# Patient Record
Sex: FEMALE | Race: WHITE | NOT HISPANIC OR LATINO | Employment: FULL TIME | URBAN - METROPOLITAN AREA
[De-identification: names, ages, dates, MRNs, and addresses within clinical notes are randomized per-mention and may not be internally consistent; named-entity substitution may affect disease eponyms.]

---

## 2017-05-12 DIAGNOSIS — N83.201 RIGHT OVARIAN CYST: Primary | ICD-10-CM

## 2017-05-12 DIAGNOSIS — R97.0 ELEVATED CEA: ICD-10-CM

## 2017-05-29 ENCOUNTER — HOSPITAL ENCOUNTER (OUTPATIENT)
Dept: RADIOLOGY | Facility: HOSPITAL | Age: 37
Discharge: HOME | End: 2017-05-29
Attending: OBSTETRICS & GYNECOLOGY
Payer: MEDICAID

## 2017-05-29 DIAGNOSIS — N83.201 RIGHT OVARIAN CYST: ICD-10-CM

## 2017-05-29 PROCEDURE — 76856 US EXAM PELVIC COMPLETE: CPT | Mod: TC

## 2017-05-29 PROCEDURE — 76830 TRANSVAGINAL US NON-OB: CPT | Mod: 26,,, | Performed by: RADIOLOGY

## 2017-05-29 PROCEDURE — 76856 US EXAM PELVIC COMPLETE: CPT | Mod: 26,,, | Performed by: RADIOLOGY

## 2017-05-30 ENCOUNTER — LAB VISIT (OUTPATIENT)
Dept: LAB | Facility: HOSPITAL | Age: 37
End: 2017-05-30
Attending: OBSTETRICS & GYNECOLOGY
Payer: MEDICAID

## 2017-05-30 ENCOUNTER — INITIAL CONSULT (OUTPATIENT)
Dept: GYNECOLOGIC ONCOLOGY | Facility: CLINIC | Age: 37
End: 2017-05-30
Payer: MEDICAID

## 2017-05-30 VITALS
SYSTOLIC BLOOD PRESSURE: 127 MMHG | HEIGHT: 66 IN | HEART RATE: 99 BPM | BODY MASS INDEX: 28.34 KG/M2 | WEIGHT: 176.38 LBS | DIASTOLIC BLOOD PRESSURE: 81 MMHG

## 2017-05-30 DIAGNOSIS — N83.201 BILATERAL OVARIAN CYSTS: ICD-10-CM

## 2017-05-30 DIAGNOSIS — N83.202 BILATERAL OVARIAN CYSTS: ICD-10-CM

## 2017-05-30 DIAGNOSIS — R97.0 ELEVATED CEA: Primary | ICD-10-CM

## 2017-05-30 DIAGNOSIS — R10.2 FEMALE PELVIC PAIN: ICD-10-CM

## 2017-05-30 DIAGNOSIS — R97.0 ELEVATED CEA: ICD-10-CM

## 2017-05-30 LAB
ANION GAP SERPL CALC-SCNC: 13 MMOL/L
BUN SERPL-MCNC: 10 MG/DL
CALCIUM SERPL-MCNC: 9.9 MG/DL
CHLORIDE SERPL-SCNC: 103 MMOL/L
CO2 SERPL-SCNC: 20 MMOL/L
CREAT SERPL-MCNC: 0.8 MG/DL
EST. GFR  (AFRICAN AMERICAN): >60 ML/MIN/1.73 M^2
EST. GFR  (NON AFRICAN AMERICAN): >60 ML/MIN/1.73 M^2
GLUCOSE SERPL-MCNC: 196 MG/DL
POTASSIUM SERPL-SCNC: 4.1 MMOL/L
SODIUM SERPL-SCNC: 136 MMOL/L

## 2017-05-30 PROCEDURE — 99204 OFFICE O/P NEW MOD 45 MIN: CPT | Mod: S$PBB,,, | Performed by: OBSTETRICS & GYNECOLOGY

## 2017-05-30 PROCEDURE — 36415 COLL VENOUS BLD VENIPUNCTURE: CPT

## 2017-05-30 PROCEDURE — 80048 BASIC METABOLIC PNL TOTAL CA: CPT

## 2017-05-30 PROCEDURE — 99999 PR PBB SHADOW E&M-EST. PATIENT-LVL V: CPT | Mod: PBBFAC,,, | Performed by: OBSTETRICS & GYNECOLOGY

## 2017-05-30 RX ORDER — MEDROXYPROGESTERONE ACETATE 150 MG/ML
INJECTION, SUSPENSION INTRAMUSCULAR
Refills: 2 | COMMUNITY
Start: 2017-03-15 | End: 2017-05-30

## 2017-05-30 RX ORDER — SERTRALINE HYDROCHLORIDE 100 MG/1
TABLET, FILM COATED ORAL
Refills: 6 | COMMUNITY
Start: 2017-05-08 | End: 2020-02-03

## 2017-05-30 RX ORDER — ONDANSETRON 4 MG/1
8 TABLET, ORALLY DISINTEGRATING ORAL
COMMUNITY
End: 2020-09-02 | Stop reason: SDUPTHER

## 2017-05-30 RX ORDER — CIPROFLOXACIN 500 MG/1
500 TABLET ORAL 2 TIMES DAILY
Refills: 0 | COMMUNITY
Start: 2017-04-27 | End: 2017-05-30

## 2017-05-30 RX ORDER — METFORMIN HYDROCHLORIDE 1000 MG/1
1000 TABLET ORAL 2 TIMES DAILY
COMMUNITY
End: 2017-05-30 | Stop reason: SDUPTHER

## 2017-05-30 RX ORDER — ACETAMINOPHEN AND CODEINE PHOSPHATE 120; 12 MG/5ML; MG/5ML
1 SOLUTION ORAL DAILY
Qty: 30 TABLET | Refills: 11 | Status: SHIPPED | OUTPATIENT
Start: 2017-05-30 | End: 2020-02-03

## 2017-05-30 RX ORDER — NITROFURANTOIN 25; 75 MG/1; MG/1
CAPSULE ORAL
Refills: 0 | COMMUNITY
Start: 2017-03-14 | End: 2017-05-30

## 2017-05-30 RX ORDER — FENOFIBRATE 134 MG/1
134 CAPSULE ORAL DAILY
COMMUNITY
End: 2017-05-30 | Stop reason: SDUPTHER

## 2017-05-30 NOTE — LETTER
May 30, 2017      Ha Neumann MD  [address]  [place] [address]           [facility]  [address]  [place] [address]  Phone: [phone]          Patient: Codi Wilkerson   MR Number: [id number]   YOB: 1980   Date of Visit: 5/30/2017       Dear Dr. Ha Neumann:    Thank you for referring Codi Wilkerson to me for evaluation. Attached you will find relevant portions of my assessment and plan of care.    If you have questions, please do not hesitate to call me. I look forward to following Codi Wilkerson along with you.    Sincerely,    Adi Reyes MD    Enclosure  CC:  No Recipients    If you would like to receive this communication electronically, please contact [email or web address] or [phone] to request more information on [facility] Link access.    For providers and/or their staff who would like to refer a patient to [facility], please contact us through our one-stop-shop provider referral line, [facility], at [phone].    If you feel you have received this communication in error or would no longer like to receive these types of communications, please e-mail [email or web address]

## 2017-05-30 NOTE — PROGRESS NOTES
Subjective:       Patient ID: Codi Wilkerson is a 36 y.o. female.    Chief Complaint: Rt Ovarian Cyst (Consult )    HPI   Patient comes in as a referral from Dr. Ha Neumann for elevated CEA.   She saw him for chronic pelvic pain and possible need for surgery. She has a history of ovarian cysts that have been symptomatic off and on. She was seeing Dr. Mert Mancera but had to change because he did not take her insurance.   Pelvic US by Dr. Neumann showed right ovary that was normal in measurements. Left ovary measured 4 x 3 x 3.7 cm. No abnormalities were noted.   CT scan of the abdomen and pelvis was done which showed a 2.5 cm complex appearing cyst like structure. Left ovary with multiple smaller cyst./ poorly distinguished.   He order tumor markers.   CEA was 6.3 (<=5.5 smoker).    was 7, LD was 1785(normal) and AFP was 1.7(normal).     Patient has been referred for further management.     Had CEA drawn here prior to visit and it is elevated at 7.7.     Pelvic US shows:   Right ovary measures 2.8 x 2.5 x 2.0 cm. There is a 2.0 2.1 x 2.0 cm right ovarian cyst with minimal peripheral vascularity consistent with corpus luteum cyst.  Within the right ovary there is normal arterial and venous flow with resistive index 0.62.    Left ovary measures 4.5 x 3.3 x 3.6 cm. There is a 2.5 x 2.8 x 3.0 cm left ovarian cyst with peripheral vascularity, consistent with corpus luteum cyst. Within the left ovary there is normal arterial and venous flow with resistive index 0.86.    There is no evidence of free fluid within the pelvis.    Consistent with bilateral corpus luteal cysts.       Chronic pelvic pain. Has been going on for years.   No inciting factors.     Saw Dr. Mert Mancera in the past and was given depo provera.   This seemed to help for a while.   S/P supracervical LH with ovarian conservation by Dr. Mancera.         Colonoscopy: 2 weeks ago had a sigmoidoscopy. Done for chronic diarrhea. Dr. Jerry at  "[facility].       Review of Systems   Constitutional: Negative for chills, fatigue and fever.   Respiratory: Negative for cough, shortness of breath and wheezing.    Cardiovascular: Negative for chest pain, palpitations and leg swelling.   Gastrointestinal: Positive for diarrhea. Negative for abdominal pain, constipation, nausea and vomiting.   Genitourinary: Positive for pelvic pain. Negative for difficulty urinating, dysuria, frequency, genital sores, hematuria, urgency, vaginal bleeding, vaginal discharge and vaginal pain.   Neurological: Negative for weakness.   Hematological: Negative for adenopathy. Does not bruise/bleed easily.   Psychiatric/Behavioral: The patient is not nervous/anxious.        Objective:     /81   Pulse 99   Ht 5' 6" (1.676 m)   Wt 80 kg (176 lb 5.9 oz)   LMP 02/12/2009   BMI 28.47 kg/m²     Physical Exam   Constitutional: She is oriented to person, place, and time. She appears well-developed and well-nourished.   HENT:   Head: Normocephalic and atraumatic.   Eyes: No scleral icterus.   Neck: Neck supple. No tracheal deviation present. No thyroid mass and no thyromegaly present.   Cardiovascular: Normal rate and regular rhythm.    Pulmonary/Chest: Effort normal and breath sounds normal. She has no wheezes.   Abdominal: Soft. She exhibits no distension and no mass. There is no hepatosplenomegaly. There is no tenderness. There is no rebound and no guarding.   Genitourinary:   Genitourinary Comments: Bimanual exam:  Vulva: no lesions. Normal appearance  Urethra: Normal size and location. No lesions  Bladder: No masses or tenderness.  Vagina: normal mucosa. No lesion  Cervix: present  Uterus: absent.  Adnexa: no masses.  Rectovaginal: No posterior cul de sac thickening or nodularity.  Rectal: no masses. Nontender. Normal tone.      Musculoskeletal: She exhibits no edema or tenderness.   Lymphadenopathy:     She has no cervical adenopathy.     She has no axillary adenopathy.        Right: " No inguinal and no supraclavicular adenopathy present.        Left: No inguinal and no supraclavicular adenopathy present.   Neurological: She is alert and oriented to person, place, and time.   Skin: Skin is warm and dry.   Psychiatric: She has a normal mood and affect. Her behavior is normal. Judgment and thought content normal.       Assessment:       1. Elevated CEA    2. Female pelvic pain    3. Bilateral ovarian cysts        Plan:   Elevated CEA  I explained tp patient and  that I do not think her ovaries are the cause for her elevated CEA.   I explained that she needs GI evaluation  Will also ask Med Onc to see her to exclude other causes of elevated CEA.   Will get CT scan of CAP    -     Basic metabolic panel; Future; Expected date: 05/30/2017  -     CT Abdomen Pelvis With Contrast; Future; Expected date: 05/30/2017  -     CT Chest With Contrast; Future; Expected date: 05/30/2017  -     Ambulatory consult to Hematology / Oncology  -     Ambulatory consult to Gastroenterology    Female pelvic pain    Bilateral ovarian cysts  I recommended suppression of ovarian function. I will start her on progestin only OCP given she is a smoker.     -     norethindrone (MICRONOR) 0.35 mg tablet; Take 1 tablet (0.35 mg total) by mouth once daily.  Dispense: 30 tablet; Refill: 11

## 2017-05-31 ENCOUNTER — TELEPHONE (OUTPATIENT)
Dept: GYNECOLOGIC ONCOLOGY | Facility: CLINIC | Age: 37
End: 2017-05-31

## 2017-05-31 ENCOUNTER — HOSPITAL ENCOUNTER (OUTPATIENT)
Dept: RADIOLOGY | Facility: HOSPITAL | Age: 37
Discharge: HOME | End: 2017-05-31
Attending: OBSTETRICS & GYNECOLOGY
Payer: MEDICAID

## 2017-05-31 ENCOUNTER — INITIAL CONSULT (OUTPATIENT)
Dept: HEMATOLOGY/ONCOLOGY | Facility: CLINIC | Age: 37
End: 2017-05-31
Payer: MEDICAID

## 2017-05-31 VITALS
BODY MASS INDEX: 28.27 KG/M2 | WEIGHT: 175.94 LBS | HEIGHT: 66 IN | TEMPERATURE: 98 F | SYSTOLIC BLOOD PRESSURE: 142 MMHG | DIASTOLIC BLOOD PRESSURE: 92 MMHG | HEART RATE: 101 BPM

## 2017-05-31 DIAGNOSIS — R97.0 ELEVATED CEA: Primary | ICD-10-CM

## 2017-05-31 DIAGNOSIS — R97.0 ELEVATED CEA: ICD-10-CM

## 2017-05-31 PROCEDURE — 74177 CT ABD & PELVIS W/CONTRAST: CPT | Mod: 26,,, | Performed by: RADIOLOGY

## 2017-05-31 PROCEDURE — 99999 PR PBB SHADOW E&M-EST. PATIENT-LVL III: CPT | Mod: PBBFAC,,, | Performed by: INTERNAL MEDICINE

## 2017-05-31 PROCEDURE — 71260 CT THORAX DX C+: CPT | Mod: 26,,, | Performed by: RADIOLOGY

## 2017-05-31 PROCEDURE — 99205 OFFICE O/P NEW HI 60 MIN: CPT | Mod: S$PBB,,, | Performed by: INTERNAL MEDICINE

## 2017-05-31 PROCEDURE — 99213 OFFICE O/P EST LOW 20 MIN: CPT | Mod: PBBFAC,25 | Performed by: INTERNAL MEDICINE

## 2017-05-31 RX ADMIN — IOHEXOL 75 ML: 350 INJECTION, SOLUTION INTRAVENOUS at 09:05

## 2017-05-31 RX ADMIN — IOHEXOL 30 ML: 300 INJECTION, SOLUTION INTRAVENOUS at 09:05

## 2017-05-31 NOTE — PROGRESS NOTES
Subjective:       Patient ID: Codi Wilkerson is a 36 y.o. female.    Chief Complaint: No chief complaint on file.    HPI  Ms. Wilkerson is referred for further evaluation of an elevated level of CEA.  She is referred by Dr. Reyes who brittany the CEA during workup for an ovarian cyst.  Pelvis ultrasound 5/29/17 showed normal sized ovaries with cysts bilaterally.  The uterus is surgically absent.  There is no evidence of free fluid within the pelvis.  CT scan of the C/A/P today showed an approximately 3 mm pleural-based nodule too small to characterize in the superior segment of the right lower lobe.  There is mild diffuse hepatic steatosis.  No focal enhancing hepatic masses or biliary ductal dilatation.  There is borderline hepatomegaly and the spleen size is at the upper limits of normal.  Small amount of retained stool in the right colon.  The visualized small bowel is unremarkable.    She has not had a colonoscopy, though she does have a variety of bowel complaints.  No weight loss and eating normally with good function.    PMH  Ovarian cysts  Depression  Diabetes on metformin  Reflux disease   Hyperlipidemia  Migraine    PSH  C-sectionx2  Ectopic  Hysterectomy    SOC  Smoke: PPD plus for 7 years  EtOH: Social  Pharmacy tech    FAM  Mother with HTN  Father with history of brain tumor  Brother A&W    Review of Systems   Constitutional: Negative for appetite change, fever and unexpected weight change.   HENT: Negative for mouth sores and sinus pressure.    Respiratory: Positive for shortness of breath. Negative for cough and choking.    Cardiovascular: Negative for chest pain and leg swelling.   Gastrointestinal: Positive for abdominal pain, constipation, diarrhea and nausea. Negative for anal bleeding and blood in stool.   Genitourinary: Negative for dysuria and frequency.   Musculoskeletal: Positive for back pain. Negative for arthralgias, gait problem and neck pain.   Skin: Negative for rash.   Neurological: Positive  for headaches. Negative for tremors, weakness and numbness.   Hematological: Negative for adenopathy.   Psychiatric/Behavioral: Positive for dysphoric mood. Negative for confusion and sleep disturbance.       Objective:      Physical Exam   Constitutional: She is oriented to person, place, and time. She appears well-developed and well-nourished. No distress.   HENT:   Head: Normocephalic and atraumatic.   Mouth/Throat: No oropharyngeal exudate.   Eyes: Conjunctivae are normal. Pupils are equal, round, and reactive to light.   Neck: Neck supple.   Cardiovascular: Normal rate and regular rhythm.    Pulmonary/Chest: Effort normal and breath sounds normal. She has no rales.   Abdominal: Soft. Bowel sounds are normal. She exhibits no mass. There is no tenderness.   Musculoskeletal: Normal range of motion. She exhibits no edema.   Lymphadenopathy:     She has no cervical adenopathy.   Neurological: She is alert and oriented to person, place, and time.   Skin: Skin is warm and dry. No rash noted.   Psychiatric: She has a normal mood and affect. Thought content normal.       Assessment:       1. Elevated CEA        Plan:       Ms. Wilkerson was found to have a mild elevation of CEA during a workup for an unrelated issue.  Mild elevations of CEA are not uncommon in smokers and this is the likely explanation in her case.  Her CT scan today fails to show worrisome lesions.  However, she does seem to have early MERRILL which might also contribute to the elevated CEA.      I discussed smoking cessation with her in detail today and she will try to convince her  join her in the effort.  There is no family history of colon cancer, especially at young age.  However, given her bowel complaints and slight elevation of CEA it may be prudent to do a colonoscopy to be sure she does not have any early lesion.  I will refer her for GI evaluation.    Once she quits smoking her CEA should be rechecked.    I also discussed diet and exercise  which will be important for her lipid disorder, diabetes and MERRILL.  She will try to start an exercise regimen.  Increasing her muscle:fat ratio will be key to her future health.    All of her questions were answered during our 1 hour visit with over 50% of that time spent in counseling.

## 2017-05-31 NOTE — TELEPHONE ENCOUNTER
Left message informing pt to contact office. Per Dr. Reyes's request pt has been scheduled with GI physician Dr. Vicky Lambert on 6/13/17 at 9:00. Will give pt physician phone number [phone] during time of call.

## 2017-05-31 NOTE — TELEPHONE ENCOUNTER
----- Message from Adi Reyes MD sent at 5/30/2017  5:45 PM CDT -----  Needs referral to GI for elevated CEA. Order placed.

## 2017-05-31 NOTE — LETTER
June 1, 2017      Adi Reyes MD  [address]  [facility] [address]           [facility]  [address]  [facility] [address]  Phone: [phone]          Patient: Codi Wilkerson   MR Number: [id number]   YOB: 1980   Date of Visit: 5/31/2017       Dear Dr. Adi Reyes:    Thank you for referring Codi Wilkerson to me for evaluation. Attached you will find relevant portions of my assessment and plan of care.    If you have questions, please do not hesitate to call me. I look forward to following Codi Wilkerson along with you.    Sincerely,    Wilder Schwartz MD    Enclosure  CC:  No Recipients    If you would like to receive this communication electronically, please contact [email or web address] or [phone] to request more information on [facility] Link access.    For providers and/or their staff who would like to refer a patient to [facility], please contact us through our one-stop-shop provider referral line, [facility] , at [phone].    If you feel you have received this communication in error or would no longer like to receive these types of communications, please e-mail [email or web address]

## 2017-06-01 ENCOUNTER — TELEPHONE (OUTPATIENT)
Dept: GASTROENTEROLOGY | Facility: CLINIC | Age: 37
End: 2017-06-01

## 2017-06-01 NOTE — TELEPHONE ENCOUNTER
----- Message from Priti Aguilar sent at 6/1/2017  9:52 AM CDT -----  That would be fine.Thanks  ----- Message -----  From: Catarina Beth MA  Sent: 6/1/2017   9:48 AM  To: Priti Paulino's first available appointment time in the office is August.  I can book her with a NP sooner and then have  do the colon, which would be sooner than August.  But again that is if she sees a NP.  ----- Message -----  From: Priti Aguilar  Sent: 6/1/2017   9:44 AM  To: Catarina Beth MA    He wants her to be seen.  ----- Message -----  From: Catarina Beth MA  Sent: 6/1/2017   9:32 AM  To: Priti Aguilar    Does she need to be seen or does  just want a colon in which case  would put in an order and the endoscopy schedulers will contact her?  ----- Message -----  From: Priti Aguilar  Sent: 6/1/2017   9:27 AM  To: Luis MIJARES Staff    Good morning,  Dr. Schwartz would like to get this patient setup for an appt with  for a consult for a colonoscopy. Thanks

## 2017-06-07 ENCOUNTER — TELEPHONE (OUTPATIENT)
Dept: ENDOSCOPY | Facility: HOSPITAL | Age: 37
End: 2017-06-07

## 2017-06-07 ENCOUNTER — OFFICE VISIT (OUTPATIENT)
Dept: GASTROENTEROLOGY | Facility: CLINIC | Age: 37
End: 2017-06-07
Payer: MEDICAID

## 2017-06-07 VITALS
HEIGHT: 66 IN | HEART RATE: 92 BPM | DIASTOLIC BLOOD PRESSURE: 91 MMHG | BODY MASS INDEX: 28.34 KG/M2 | WEIGHT: 176.38 LBS | SYSTOLIC BLOOD PRESSURE: 136 MMHG

## 2017-06-07 DIAGNOSIS — K76.0 FATTY LIVER: ICD-10-CM

## 2017-06-07 DIAGNOSIS — Z12.11 SPECIAL SCREENING FOR MALIGNANT NEOPLASMS, COLON: Primary | ICD-10-CM

## 2017-06-07 DIAGNOSIS — R97.0 ELEVATED CEA: Primary | ICD-10-CM

## 2017-06-07 DIAGNOSIS — R19.7 DIARRHEA, UNSPECIFIED TYPE: ICD-10-CM

## 2017-06-07 PROCEDURE — 99214 OFFICE O/P EST MOD 30 MIN: CPT | Mod: PBBFAC | Performed by: NURSE PRACTITIONER

## 2017-06-07 PROCEDURE — 99214 OFFICE O/P EST MOD 30 MIN: CPT | Mod: S$PBB,,, | Performed by: NURSE PRACTITIONER

## 2017-06-07 PROCEDURE — 99999 PR PBB SHADOW E&M-EST. PATIENT-LVL IV: CPT | Mod: PBBFAC,,, | Performed by: NURSE PRACTITIONER

## 2017-06-07 RX ORDER — POLYETHYLENE GLYCOL 3350, SODIUM SULFATE ANHYDROUS, SODIUM BICARBONATE, SODIUM CHLORIDE, POTASSIUM CHLORIDE 236; 22.74; 6.74; 5.86; 2.97 G/4L; G/4L; G/4L; G/4L; G/4L
4 POWDER, FOR SOLUTION ORAL ONCE
Qty: 4000 ML | Refills: 0 | Status: SHIPPED | OUTPATIENT
Start: 2017-06-07 | End: 2017-06-07

## 2017-06-07 NOTE — PATIENT INSTRUCTIONS
HIGH FIBER KEY POINTS:  1. Goal of 20-25 grams of fiber each day for women, 30-35 grams each day for men. Slowly build up to this goal as you may experience gas and bloating at first.  2. Take a fiber supplement to help reach this goal: Metamucil, Citrucel, Fibercon, Konsyl, or Psyllium  3. For Constipation: Finely ground psyllium husk (with or without flavoring or Additives)   - To start: 1 teaspoon once a day in the morning with 8 oz of liquid    followed by an additional 8 ounce glass of water   - After a week: add a second teaspoon in the middle of the day   - After two weeks: add a third teaspoon at bedtime   - Follow each dose with another glass of water. Can add a splash of juice or lemonade for taste.  3. Drink 6-8 glasses of water a day.  4. Try to do plant fiber (fruits and vegetables) over processed fibers (cereals and breads)     HIGH FIBER DIET  Fiber is present in all fruits, vegetables, cereals and grains. Fiber passes through the body undigested. A high fiber diet helps food move through the intestinal tract. The added bulk is helpful in preventing constipation. In persons with diverticulosis it serves to clean out the pouches along the colon wall while preventing new ones from forming. A high fiber diet may reduce the risk of colon cancer, decreases blood cholesterol and prevents high blood sugar in diabetics.    The foods listed below are high in fiber and should be included in your diet. If you are not used to high fiber foods, start with 1 or 2 foods from this list. Every 3-4 days add a new one to your diet until you are eating 4 high fiber foods per day. This should give you 20-35 Gm of fiber/day. It is also important to drink a lot of water when you are on this diet (6-8 glasses a day). Water causes the fiber to swell and increases the benefit.  Add Fiber to Your Diet   Adding fiber to your diet can help relieve constipation by making stools softer and easier to pass. To increase your fiber  intake, your doctor may recommend a bulking agent, such as psyllium. This is a high-fiber supplement available at most grocery and drugstores. Eating more fiber-rich foods will also help. There are two types of fiber:   Insoluble fiber is the main ingredient in bulking agents. Its also found in foods such as wheat bran, whole-grain breads, fresh fruits, and vegetables.   Soluble fiber is found in foods such as oat bran. Although soluble fiber is good for you, it may not ease constipation as much as foods high in insoluble fiber.    FOODS HIGH IN DIETARY FIBER:  BREADS: Made with 100% whole wheat flour; Eric, wheat or rye crackers; tortillas, bran muffins. Whole grains, such as wheat bran, corn bran, and brown rice.  CEREALS: Whole grain cereal with bran (Chex, Raisin Bran, Corn Bran), oatmeal, rolled oats, granola, wheat flakes, brown rice  NUTS: Any nuts  FRUITS: All fresh fruits along with edible skins, (bananas, citrus fruit, mangoes, pears, prunes, raisins, apples, pineapple, apricot, melon, jams and marmalades), fruit juices (especially prune juice)  VEGETABLES: All types, preferably raw or lightly cooked: especially, celery, eggplant, potatoes,spinach, broccoli, brussel sprouts, winter squash, carrots, cauliflower, soybeans, lentils, fresh and dried beans of all kinds  OTHER: Popcorn, any spices.   Nuts and legumes, especially peanuts, lentils, and kidney beans.  Easy Ways to Add Fiber   The tips below offer some simple ways to add more high-fiber foods to your meals.   Start your day with a high-fiber breakfast. Eat a wheat bran cereal along with a sliced banana. Or, try peanut butter on whole-wheat toast.   Eat carrot sticks for snacks. Theyre easy to prepare, taste great, and are low in calories.   Use whole-grain breads instead of white bread for sandwiches.   Eat fruits for treats. Try an apple and some raisins instead of a candy bar.  Vegetables  Artichoke, cooked 10.3  Asparagus - 2.8  Beans -  2  Broccoli, boiled 1 cup - 5.1  Grayling sprouts, cooked 1 cup - 4.1  Carrots - boiled 2.5, raw 4  Celery - 1  Corn - 4  Corn on the Cob - 5.9  Lettuce - 1  Peas (canned) - 4  Peas (dried) - 7.9  Green peas (cooked) - 8.8  Potato, with skin, baked - 3.0  Spinach - 4  Sweet potato - 3.4  Turnip greens, boiled 1 cup - 5.0  Sweet corn, cooked  1 cup  4.0  Tomato paste -1/4 cup -2.7  Yam - 2.7  Legumes, Nuts, and Seeds  Split peas, cooked  1 cup  16.3  Lentils, cooked 1 cup 15.6  Black beans, cooked 1 cup 15.0  Black eyed peas (1/2 cup) 4.2  Chick peas (1/2 cup) 5  Lima beans, cooked 1 cup  13.2  Baked beans, vegetarian, canned, cooked 1 cup 10.4  Sunflower seed kernels 1/4 cup 3.9  Almonds 1 ounce (23 nuts)  3.5  Pistachio nuts 1 ounce (49 nuts) 2.9  Pecans 1 ounce (19 halves) 2.7  Beans (lima,kidney,baked) - 10 (1/2 cup)  Refried beans -12 (1cup)  Lentils - 8  Soybeans (1/2 cup) 5  Fruit  Raspberries  1 cup  8.0  Pear, with skin - 5.5  Apple, with skin 4.4 (Juice = 0)  Banana - 3.1  Orange - 3.1  Strawberries (halves) 1 cup - 3.0  Figs, dried 2 medium - 1.6  Raisins 1 ounce (60 raisins) -1.0  Grapefruit - 1.4  Peach - 2  Kiwi - 5  Domenic - 3.7  Pineapple - 2  Cereal, Grains, and Pasta  Spaghetti, whole-wheat, cooked 1 cup 6.3  Barley, pearled, cooked 1 cup 6.0  Bran flakes 3/4 cup 5.3  Oat bran muffin 1 medium 5.2  Oatmeal, instant, cooked 1 cup 4.0  Popcorn, air-popped 3 cups 3.5  Brown rice, cooked  1 cup  3.5  Bread, rye 1 slice 1.9, pumpernickel - 2.1  Bagel - 1.6  Bread, whole-wheat or multigrain  1 slice 1.9  Fiber One - 14  100% Bran - 13  Raisin Bran - 3.5  All Bran Extra Fiber - 13         Probiotics      For IBS and bloating, we typically recommend Align, VSL#3, or Kalion, which can typically be found without a prescription at the pharmacy. Give this at least  a month to work, but can take up to 3 months to repopulate your intestines, so be patient!     VSL#3 is a potent probiotic which can be  "found in pill form (try [employer] for best price, or [employer]). $52 for a 2 month supply. The sachets are for ulcerative colitis and require a prescription.    If you go on the internet, a reputable/powerful probiotic appears to be Super Shield from [employer] at: http://www.bluerockholistics.Piece [employer]/product/pross.asp  You can also choose PB 8 which can be found at [employer] or online.    For diarrhea from travel or antibiotics, we typically recommend Culturelle or Florastor (especially for diarrhea from C diff infection).         General information:  Pick one that has at least seven strains, and five billion CFU (colony forming units).    Products containing probiotics have flooded the market in recent years. As more people seek natural or non-drug ways to maintain their health, manufacturers have responded by offering probiotics in everything from yogurt to chocolate and granola bars to powders and capsules.    Although probiotics have been around for generations - think of the "live active cultures"in several brands of yogurt - the sheer number of products with probiotics now available may overwhelm even the most conscientious of shoppers. In some respects, the industry has grown faster than the research and scientists and doctors are calling for more studies to help determine which probiotics are beneficial and which might be a waste of money.    What Are Probiotics?  Probiotics are living microscopic organisms, or micro-organisms, that scientific research has shown to benefit your health. Most often they are bacteria, but they may also be other organisms such as yeasts. In some cases they are similar, or the same, as the good bacteria already in your body, particularly those in your gut.    The most common probiotic bacteria come from two groups, Lactobacillus or Bifidobacterium, although it is important to remember that there are many other types of bacteria that are also classified as probiotics. Each " group of bacteria has different species and each species has different strains. This is important to remember because different strains have different benefits for different parts of your body. For example, Lactobacillus casei Shirota has been shown to support the immune system and to help food move through the gut, but Lactobacillus bulgaricus may help relieve symptoms of lactose intolerance, a condition in which people cannot digest the lactose found in most milk and dairy products. In general, not all probiotics are the same, and they dont all work the same way.    Scientists are still sorting out exactly how probiotics work. They may:       Boost your immune system by producing antibodies for certain viruses.  Produce substances that prevent infection.  Prevent harmful bacteria from attaching to the gut wall and growing there.  Send signals to your cells to strengthen the mucus in your intestine and help it act as a barrier against infection.  Inhibit or destroy toxins released by certain bad bacteria that can make you sick.  Produce B vitamins necessary for metabolizing the food you eat, warding off anemia caused by deficiencies in B-6 and B-12, and maintaining healthy skin and a healthy nervous system.      Common Uses  Probiotics are most often used to promote digestive health. Because there are different kinds of probiotics, it is important to find the right one for the specific health benefit you seek. Researchers are still studying which probiotic should be used for which health or disease state. Nevertheless, probiotics have been shown to help regulate the movement of food through the intestine. They also may help treat digestive disease, something of much interest to gastroenterologists. Some of the most common uses for probiotics include the treatment of the following:    Irritable Bowel Syndrome    Irritable bowel syndrome (IBS) is a disorder of movement in the gut. People who have IBS may have  diarrhea, constipation or alternating bouts of both. IBS is not caused by injury or illness. Often the only way doctors can diagnose it is to rule out other conditions through testing.    Probiotics, particularly Bifidobacterium infantis, Sacchromyces boulardii, Lactobacillus plantarum and combination probiotics may help regulate how often people with IBS have bowel movements. Probiotics may also help relieve bloating from gas. Bifidobacterium infantis 70060, Lactobacillus plantarum 299V or Bifidobacterium bifidum [id number] have been shown to help regulate bowel movements and relieve bloating, pain and gas.    Inflammatory Bowel Disease    Though some of the symptoms are the same, inflammatory bowel disease (IBD) is different from IBS because in IBD, the intestines become inflamed. Unlike IBS, IBD is a disorder of the immune system. Symptoms include abdominal cramps, pain, diarrhea, weight loss and blood in your stools. In Crohns disease, ulcers may develop anywhere in your intestine including both the large and small bowels. In ulcerative colitis, inflammation only involves the large intestine. Bouts of inflammation may come and go, but in some cases, prescription medication is needed to keep inflammation in check.        Some studies suggest that probiotics may help decrease inflammation and delay the next bout of disease. Ulcerative colitis seems to respond better to probiotics than Crohns disease does. So far, E. coli Nissle, and a mixture of several strains of Lactobacillus, Bifidobacterium and Streptococcus may be most beneficial. Research is continuing to determine which probiotics are best to treat IBD.    Infectious Diarrhea    Infectious diarrhea is caused by bacteria, viruses or parasites. There is evidence that probiotics such as Lactobacillus rhamnosus and Lactobacillus casei may be particularly helpful in treating diarrhea caused by rotavirus, which often affects babies and small children. Several  strains of Lactobacillus and a strain of the yeast Saccharomyces boulardii may help treat and shorten the course of infectious diarrhea.    Antibiotic-Related Diarrhea  Take Lactobacillus rhamnosus GG and/or Saccharomyces boulardii (Culturelle and/or Florastor) six hours after each dose of antibiotics. Increase the dose to 10 billion CFUs per day and continue for one to two weeks after you stop taking the antibiotic.    Sometimes taking an antibiotic can cause infectious diarrhea by reducing the number of good microorganisms in your gut. Then bacteria that normally do not give you any trouble can grow out of control. One such bacterium is Clostridium difficile, which is a major cause of diarrhea in hospitalized patients and people in long-term care facilities like nursing homes. The trouble with Clostridium difficile is that it tends to come back, but there is evidence that taking probiotics such as Saccharomyces boulardii may help prevent this. There is also evidence that taking probiotics when you first start taking an antibiotic may help prevent antibiotic-related diarrhea in the first place.    Travelers Diarrhea    Its possible to get infectious diarrhea when you travel and your body is exposed to new, normally harmless bacteria (travelers diarrhea). Most studies show that probiotics are not very effective in preventing or treating travelers diarrhea in adults. Taking Saccharomyces boulardii weeks before your trip may help prevent travelers diarrhea, which usually comes from ingesting food or water thats been contaminated with bacteria.    Other Uses    Other potential uses for probiotics include maintaining a healthy mouth, preventing and treating certain skin conditions like eczema, promoting health in the urinary tract and vagina, and preventing allergies (especially in children). There is not as much research about these uses as there is about the benefits of probiotics for your digestive system,  and studies have had mixed results. If you have eczema ?Lactobacillus rhamnosus  and Lactobacillus fermentum VRI-003 PCC have been shown to help treat those itchy, scaly skin rashes -- especially in children.If you have a cold ?Some research suggests that Bifidobacterium animalis lactis Bi-07 and Lactobacillus acidophilus NCFM can help reduce the duration and severity of the common cold and flu by enhancing the bodys production of antibodies. If you have a vaginal infection ?Lactobacillus acidophilus, Lactobacillus rhamnosus GR-1 and Lactobacillus reuteri RC-14 have been shown to help prevent and clear up bacterial vaginosis and urinary tract infections in some individuals. Researchers point to Lactobacillus reuteri RC-14 and Lactobacillus rhamnosus GR-1 as the most effective stains to protect against yeast infections as theyre especially adept at colonizing the vaginal environment and fighting off unwelcome bacteria and fungi. If you have bad breath, gingivitis or periodontitis ?A probiotic lozenge or mouthwash might be your best bet. Lactobacillus reuteri LR-1 or LR-2 promote oral health by binding to teeth and gums, preventing plaque formation in the mouth. Research has demonstrated the ability of Weissella cibaria to freshen breath by inhibiting the production of sulfur compounds in the mouth.    Are Probiotics Safe?  It is generally thought that most probiotics are safe, although it is not yet known if they are safe for people with severely impaired immune systems. They may be taken by people without a diagnosed digestive problem. Their safety is evident since they have a long history of use in dairy foods like yogurt, cheese and milk.    However, you should talk to your doctor before adding these or any other probiotics to your diet. Probiotics might not be appropriate for seniors. Some probiotics may interfere with or interact with medications. Your doctor will be able to help you determine if  probiotics are right for you based on your medical history.    Research about the use of probiotics in children has grown in recent years. Although studies have shown that probiotics may help to treat infectious diarrhea in babies and small children, researchers are unsure whether probiotics are particularly helpful for children with Crohns disease or other types of inflammatory bowel disease. Ask your childs pediatrician about probiotics before giving them to your child.    The exception here is breastfeeding. Breast milk stimulates the growth of normal gut organisms that are important for a babys digestive health and developing immune system. That is one reason why doctors strongly encourage mothers to breastfeed their babies.    Overall, scientists agree that more research is necessary before they can make blanket statements about the safety of probiotics in general or about individual groups and strains. Future studies will show whether probiotics can be used to treat diseases, are safe to use for a long time, and if it is possible to take too many probiotics or mix them in inappropriate ways. These studies will also guide us as to which probiotics to use for different disorders.    Keep in mind that probiotics are considered dietary supplements and are not FDA-regulated like drugs. They are not standardized, meaning they are made in different ways by different companies and have different additives. How well a probiotic works may differ from brand to brand and even from batch to batch within the same brand. Probiotics also vary tremendously in their cost, and cost does not necessarily reflect higher quality.    Side effects may vary, too. The most common are gas and bloating. These are usually mild and temporary. More serious side effects include allergic reactions, either to the probiotics themselves or to other ingredients in the food or supplement.    Choosing a Probiotic  Probiotics are available in  yogurt and other dairy products, chocolate and granola bars, juices, powders, and capsules. You can purchase them at your local supermarket or health food store as well as on the Internet. Here are some tips to help you choose.    Check the label. The more information there is on the label, the better. Ideally, the label will tell you the probiotics group, species and strain, and how many of the microorganisms will still be alive on the use-by date. Although some products guarantee how many organisms were present at the time it was manufactured, often it is less clear how many organisms are present when these products are actually consumed.    Call the . Unfortunately, many labels dont say exactly which strain is in the product; many list only the group and the species, such as Lactobacillus acidophilus or Bifidobacterium lactis. If youre planning to take a probiotic for a specific condition, call the company and find out exactly which strains its products contain and what research they have done to support their health claims. You may be able to find this information on their Web site, as well.    Beware of the Internet. If you order products from the Internet, make sure you know the company from which you are ordering. There are plenty of scammers out there who are willing to send you fake products labeled as probiotics. At best, the ingredients could be harmless, like garlic powder. At worst, they could be laced with powerful herbs, prescription medications or illegal drugs. Some companies may simply take your money and disappear.    Stick to well-established companies and companies you know. The longer a company has been around, the more likely its products have been tested and studied repeatedly and the bigger the reputation the company has to protect. Some manufacturers that have been making products with probiotics for a while are [employer], [employer], [employer], [employer], [employer], [employer], and [employer].    Storage    One final note: Remember to store your probiotic according to package instructions and make sure the product has a sell-by or expiration date. Probiotics are living organisms. Even if they are dried and dormant, like in a powder or capsule, they must be stored properly or they will die. Some require refrigeration whereas others do not. They also have a shelf-life, so make sure you use them before the expiration date on the package.      Lactose Intolerance Tips    The testing for lactose intolerance is not very reliable. The best indicator if you are lactose intolerant is by cutting all lactose out of your diet for 2 weeks and noting if you see less gas, bloating, cramps, and diarrhea.    Alternatives: Lactose free milk (lactaid), almond milk, coconut milk, rice milk, hemp milk lactose free cottage cheese. Yogurt with live and active cultures may slowly be reintroduced after all lactose has been eliminated if tolerated.    Avoid: Ice cream, milk, condensed milk, soft cheeses (cottage cheese), butter, buttermilk, cream, whey products    Low lactose cheeses: Swiss, Parmesan, Gouda, Mike, Provolone, Cheddar, Edam, Kelford, and Minnie Hinojosa.         More detailed information courtesy of [employer] Website:    [facility]   General Guidelines for Managing Lactose Intolerance    Lactose is a sugar found in certain dairy products. Many adults have trouble digesting foods containing lactose. This is often due to low levels of the enzyme (lactase) in the intestine that is needed to break down lactose. Patients with intestinal disease or injury may also experience lactose intolerance. Symptoms of lactose intolerance include nausea, bloating, gas, diarrhea and/or abdominal pain/cramping. These symptoms generally occur 30 minutes to 2 hours after eating a food containing lactose.      The amount of lactose an  individual can tolerate varies from person to person. Many people with lactose intolerance can tolerate some lactose-containing foods by adjusting the type, amount and timing of these foods. Some patients may need to (or may choose to) limit or eliminate these foods completely. If you wish to include lactose-containing foods in your diet, the following suggestions may help. Always talk with your doctor before making changes to your prescribed diet.   ??Add new foods one at a time; decrease the amount, or eliminate the food, if symptoms occur.   ??Most people with lactose intolerance do not need to avoid all dairy products, for example:   o Cultured yogurt contains live cultures that naturally help digest lactose. Many people with lactose intolerance tolerate cultured yogurt well. Check labels to see if a yogurt contains live cultures.   o Hard cheese is low in lactose and is usually well tolerated   ??If you wish to drink milk, try taking small amounts (1/2 cup at a time). Many people can tolerate up to 2 cups of milk per day when taken in smaller servings spread out over the course of the day.   ??Foods that contain lactose may be better tolerated if they are eaten with a meal.      For those who need to limit or eliminate lactose, the [facility] handout on Lactose Content of Common Foods (available at [email or web address]) can help identify sources of lactose. Note that many people with lactose intolerance can tolerate 12 grams or more of lactose per day, particularly if the suggestions above are followed.      Foods made from certain dairy products (such as pudding, cream soups, cream or cheese sauces, etc.) also contain lactose. The amount of lactose in a product will depend on the amount of dairy products used. Other foods such as baked items, instant mixes, salad dressings, etc. may also contain lactose. The following ingredients suggest a product contains lactose:   Butter   Caseinates   Cheese   Cream   Curds   Dry milk solids   Lactose   Milk  Milk by-products   Milk solids   Milk sugar   Non-fat dry milk powder  Skim milk solids   Whey   Yogurt        ?Lactose can also be found in medications. Check for lactose on the label, although it does not have to be listed; if you are very sensitive to lactose and have persistent symptoms, ask your pharmacist to help you. Ask your doctor to prescribe a lactose-free alternative if one exists.      Specialty Products: If you are not able to tolerate lactose-containing foods using the above suggestions, special products are available. Keep in mind, not everyone with lactose intolerance needs special products; many people can tolerate regular dairy products by adjusting the type and amount consumed. You may want to try the tips provided in this handout before trying the more costly specialty products.   100% Lactose Reduced Milk    100% lactose reduced milk is available in the dairy section of most grocery stores.    Available in nonfat, 1%, 2%, and whole milk varieties.    Lactose reduced milk contains the same nutrition, including calcium and vitamin D as regular milk.    Lactose reduced milk does cost a bit more than regular milk.    Lactose reduced milk may taste sweeter than regular milk.     Lactase Enzyme Supplements    These products contain the enzyme lactase, which is needed for the digestion of lactose.    Available in caplet or chewable form.    Lactase enzyme supplements may not be needed with some dairy products, such as cultured yogurt and cheese.    Most products recommend a dose of 9000 lactase units be taken with each dairy product. This amount may not always be needed; you may want to start with a smaller dose and increase only if symptoms persist.    Both brand name and store brand varieties are available. Store brands are often more cost-effective than name brands, however, prices will vary depending on store, location, and quantity purchased.      Other Products    Soy milk, rice milk and almond milk are lactose free. If you plan to use these products as an alternative calcium and/or vitamin D source, read labels carefully and choose a brand which specifically states it contains these nutrients and in what amounts.     Calcium and Vitamin D    If you are on a low lactose diet, discuss your calcium and vitamin D intake with your physician or dietitian. Studies have shown that individuals with lactose intolerance often do not take in enough of these nutrients. Inadequate calcium and vitamin D intake increases the risk of osteoporosis. A dietitian can help you determine whether you are getting enough of these nutrients in your diet.

## 2017-06-07 NOTE — PROGRESS NOTES
"    [facility] Note    Reason for Visit:  The primary encounter diagnosis was Elevated CEA. Diagnoses of Diarrhea, unspecified type and Fatty liver were also pertinent to this visit.    PCP:   Tomas Cabrales   [address] [facility] / [place] [address]    Referring MD:  Adi Reyes Md  [address] [facility] [place] [address]    HPI:  This is a 36 y.o. female here for evaluation of elevated CEA levels.  Here with daughter.  Oncology recommends colonoscopy.  Pt states she has a flex sig last month at w.[place] for diarrhea x one month- biopsies were normal. Was dx w/ "bacterial infection" via stool samples. Treated with cipro x 10 days. Currently w/ 4 days per week with 1-7 loose BM/day. Will have bristol type 1 BM with straining once monthly. Reports these types of bowel issues for her entire life. Attempted GFD, but could not tolerate taste of GF items. Never tested for celiac per pt.     Abdominal pain - lower abd cramping w/ urge to have a BM preceding BM, better after BM. This occurs with most BMs. Whole milk makes her nauseated. Reports chronic nausea -currently occurring twice monthly takes Zofran or phenergan PRN.     Reflux -denies  Dysphagia/odynophagia - no   GI bleeding - denies hematochezia, hematemesis, melena, BRBPR, black/tarry stools, and coffee ground emesis  NSAID usage - Excedrin or ibuprofen for HAs twice weekly.     ROS:  Constitutional: No fevers, no chills, No unintentional weight loss, no fatigue,   ENT: No allergies  CV: No chest pain, no palpitations, no perif. edema, no sob on exertion  Pulm: No cough, No shortness of breath, no wheezes, no sputum  Ophtho: No vision changes  GI: see HPI; also +chronic nausea-occurring rarely-takes Zofran or phenergan PRN-twice monthly, no vomiting, no change in appetite  Derm: No rash  Heme: No lymphadenopathy, No bruising  MSK: No arthritis, no muscle pain, no muscle weakness  : No dysuria, No hematuria  Endo: No hot or cold " intolerance  Neuro: No syncope, No seizure,       Medical History:  has a past medical history of Bilateral ovarian cysts (2017); Depression; Diabetes mellitus; Elevated CEA (2017); Female pelvic pain (2017); GERD (gastroesophageal reflux disease); High cholesterol; HLD (hyperlipidemia); Hypertension; Migraine headache; Pre-diabetes; and Right ovarian cyst (2017).    Surgical History:  has a past surgical history that includes  section, low transverse (, ); Ectopic pregnancy surgery (); Partial hysterectomy (3 years ago); Salpingectomy (Right, ); and Hysterectomy ().    Family History: family history includes Depression in her mother; Hypertension in her brother; Migraines in her mother..     Social History:  reports that she has been smoking Cigarettes.  She has a 3.00 pack-year smoking history. She does not have any smokeless tobacco history on file. She reports that she drinks about 1.5 oz of alcohol per week . She reports that she does not use drugs.    Review of patient's allergies indicates:   Allergen Reactions    Morphine Itching       Current Outpatient Prescriptions   Medication Sig    CHANTIX STARTING MONTH BOX 0.5 mg (11)- 1 mg (42) tablet USE AS DIRECTED ON PACKAGE **THANK YOU**    clonazePAM (KLONOPIN) 0.5 MG tablet Take 1 tablet (0.5 mg total) by mouth 2 (two) times daily as needed for Anxiety.    ergocalciferol (VITAMIN D2) 50,000 unit Cap Take 50,000 Units by mouth every 7 days.    fenofibrate micronized (LOFIBRA) 134 MG Cap Take 1 capsule (134 mg total) by mouth once daily.    fluticasone (FLONASE) 50 mcg/actuation nasal spray 1 spray by Each Nare route once daily.    metformin (GLUCOPHAGE) 1000 MG tablet Take 1 tablet (1,000 mg total) by mouth 2 (two) times daily. for diabetes.    norethindrone (MICRONOR) 0.35 mg tablet Take 1 tablet (0.35 mg total) by mouth once daily.    ondansetron (ZOFRAN-ODT) 4 MG TbDL Take 8 mg by mouth every 12  "(twelve) hours.    pravastatin (PRAVACHOL) 20 MG tablet Take 1 tablet (20 mg total) by mouth once daily.    promethazine (PHENERGAN) 25 MG tablet Take 1 tablet (25 mg total) by mouth every 6 (six) hours as needed for Nausea.    sertraline (ZOLOFT) 100 MG tablet TAKE ONE Tablet BY MOUTH EVERY DAY THANK YOU    polyethylene glycol (GOLYTELY,NULYTELY) 236-22.74-6.74 -5.86 gram suspension Take 4,000 mLs (4 L total) by mouth once.     No current facility-administered medications for this visit.        Objective Findings:    Vital Signs:  BP (!) 136/91   Pulse 92   Ht 5' 6" (1.676 m)   Wt 80 kg (176 lb 5.9 oz)   LMP 02/12/2009   BMI 28.47 kg/m²   Body mass index is 28.47 kg/m².    Physical Exam:  General Appearance: Well appearing in no acute distress  Head:   Normocephalic, without obvious abnormality  Eyes:    No scleral icterus, EOMI  ENT: Neck supple, Lips, mucosa, and tongue normal; teeth and gums normal  Lungs: CTA bilaterally in anterior and posterior fields, no wheezes, no crackles.  Heart:  Regular rate and rhythm, S1, S2 normal, no murmurs heard  Abdomen: Soft, non tender, non distended with positive bowel sounds in all four quadrants. No hepatosplenomegaly, ascites, or mass  Extremities: 2+ radial pulses, no clubbing, cyanosis or edema  Skin: No rash to exposed areas  Neurologic: A&Ox4      Labs:  Lab Results   Component Value Date    WBC 10.4 01/10/2017    HGB 15.0 01/10/2017    HCT 44.5 01/10/2017     01/10/2017    CHOL 165 01/10/2017    TRIG 814 (HH) 01/10/2017    HDL 12 (L) 01/10/2017    ALT 34 (H) 04/14/2017    AST 28 04/14/2017     05/30/2017    K 4.1 05/30/2017     05/30/2017    CREATININE 0.8 05/30/2017    BUN 10 05/30/2017    CO2 20 (L) 05/30/2017    TSH 2.200 01/10/2017    HGBA1C 8.4 (H) 04/14/2017       Imaging:  CT chest, abd/pelvis- small pleural nodule-possible granuloma. Fatty liver and hepatomegaly. Right renal cyst. Right ovarian cyst.   Endoscopy:    Per pt, 5/2017 " "flex sig @ [facility] for diarrhea-bx-WNL.  Per pt, 7 years ago colonoscopy @ [facility] for "stomach issues"-diverticulosis.  Assessment:  1. Elevated CEA    2. Diarrhea, unspecified type    3. Fatty liver           Recommendations:  1. Elevated CEA-colonoscopy ordered.  2. Diarrhea- colonoscopy r/o MC. Daily fiber and probiotics as per handout provided. Lactose elimination trial as per handout provided.  3.  Fatty liver- referral to hepatology placed.    Return in about 2 months (around 8/7/2017) for diarrhea..      Order summary:  Orders Placed This Encounter    Ambulatory Referral to Hepatology    Case request GI: COLONOSCOPY         Thank you so much for allowing me to participate in the care of Codi Escoto, APRN, FNP-C      "

## 2017-06-07 NOTE — LETTER
June 7, 2017      Adi Reyes MD  [address] [facility] [address]           [facility]  [address]  [place] [address]  Phone: [phone]  Fax: [phone]          Patient: Codi Wilkerson   MR Number: [id number]   YOB: 1980   Date of Visit: 6/7/2017       Dear Dr. Adi Reyes:    Thank you for referring Codi Wilkerson to me for evaluation. Attached you will find relevant portions of my assessment and plan of care.    If you have questions, please do not hesitate to call me. I look forward to following Codi Wilkerson along with you.    Sincerely,    Mely Escoto, MARVA    Enclosure  CC:  No Recipients    If you would like to receive this communication electronically, please contact [email or web address] or [phone] to request more information on [employer] Link access.    For providers and/or their staff who would like to refer a patient to [facility], please contact us through our one-stop-shop provider referral line, [facility], at [phone].    If you feel you have received this communication in error or would no longer like to receive these types of communications, please e-mail [email or web address]

## 2017-06-08 ENCOUNTER — TELEPHONE (OUTPATIENT)
Dept: GYNECOLOGIC ONCOLOGY | Facility: CLINIC | Age: 37
End: 2017-06-08

## 2017-06-12 ENCOUNTER — ANESTHESIA EVENT (OUTPATIENT)
Dept: ENDOSCOPY | Facility: HOSPITAL | Age: 37
End: 2017-06-12
Payer: MEDICAID

## 2017-06-12 ENCOUNTER — HOSPITAL ENCOUNTER (OUTPATIENT)
Facility: HOSPITAL | Age: 37
Discharge: HOME | End: 2017-06-12
Attending: INTERNAL MEDICINE | Admitting: INTERNAL MEDICINE
Payer: MEDICAID

## 2017-06-12 ENCOUNTER — SURGERY (OUTPATIENT)
Age: 37
End: 2017-06-12

## 2017-06-12 ENCOUNTER — ANESTHESIA (OUTPATIENT)
Dept: ENDOSCOPY | Facility: HOSPITAL | Age: 37
End: 2017-06-12
Payer: MEDICAID

## 2017-06-12 VITALS
RESPIRATION RATE: 18 BRPM | WEIGHT: 174 LBS | OXYGEN SATURATION: 97 % | HEIGHT: 66 IN | HEART RATE: 78 BPM | BODY MASS INDEX: 27.97 KG/M2 | SYSTOLIC BLOOD PRESSURE: 127 MMHG | DIASTOLIC BLOOD PRESSURE: 77 MMHG | TEMPERATURE: 98 F

## 2017-06-12 DIAGNOSIS — R19.7 DIARRHEA, UNSPECIFIED TYPE: ICD-10-CM

## 2017-06-12 DIAGNOSIS — R97.0 ELEVATED CEA: Primary | ICD-10-CM

## 2017-06-12 LAB — POCT GLUCOSE: 195 MG/DL (ref 70–110)

## 2017-06-12 PROCEDURE — D9220A PRA ANESTHESIA: Mod: ANES,,, | Performed by: ANESTHESIOLOGY

## 2017-06-12 PROCEDURE — 25000003 PHARM REV CODE 250: Performed by: INTERNAL MEDICINE

## 2017-06-12 PROCEDURE — 37000008 HC ANESTHESIA 1ST 15 MINUTES: Performed by: INTERNAL MEDICINE

## 2017-06-12 PROCEDURE — 45380 COLONOSCOPY AND BIOPSY: CPT | Mod: ,,, | Performed by: INTERNAL MEDICINE

## 2017-06-12 PROCEDURE — 37000009 HC ANESTHESIA EA ADD 15 MINS: Performed by: INTERNAL MEDICINE

## 2017-06-12 PROCEDURE — 88305 TISSUE EXAM BY PATHOLOGIST: CPT | Mod: 26,,, | Performed by: PATHOLOGY

## 2017-06-12 PROCEDURE — 82962 GLUCOSE BLOOD TEST: CPT | Performed by: INTERNAL MEDICINE

## 2017-06-12 PROCEDURE — 27201012 HC FORCEPS, HOT/COLD, DISP: Performed by: INTERNAL MEDICINE

## 2017-06-12 PROCEDURE — D9220A PRA ANESTHESIA: Mod: CRNA,,, | Performed by: NURSE ANESTHETIST, CERTIFIED REGISTERED

## 2017-06-12 PROCEDURE — 45380 COLONOSCOPY AND BIOPSY: CPT | Performed by: INTERNAL MEDICINE

## 2017-06-12 PROCEDURE — 63600175 PHARM REV CODE 636 W HCPCS: Performed by: NURSE ANESTHETIST, CERTIFIED REGISTERED

## 2017-06-12 PROCEDURE — 88305 TISSUE EXAM BY PATHOLOGIST: CPT | Performed by: PATHOLOGY

## 2017-06-12 RX ORDER — PROPOFOL 10 MG/ML
VIAL (ML) INTRAVENOUS CONTINUOUS PRN
Status: DISCONTINUED | OUTPATIENT
Start: 2017-06-12 | End: 2017-06-12

## 2017-06-12 RX ORDER — SODIUM CHLORIDE 9 MG/ML
INJECTION, SOLUTION INTRAVENOUS CONTINUOUS
Status: DISCONTINUED | OUTPATIENT
Start: 2017-06-12 | End: 2017-06-12 | Stop reason: HOSPADM

## 2017-06-12 RX ORDER — PROPOFOL 10 MG/ML
VIAL (ML) INTRAVENOUS
Status: DISCONTINUED | OUTPATIENT
Start: 2017-06-12 | End: 2017-06-12

## 2017-06-12 RX ADMIN — SODIUM CHLORIDE: 0.9 INJECTION, SOLUTION INTRAVENOUS at 07:06

## 2017-06-12 RX ADMIN — PROPOFOL 100 MG: 10 INJECTION, EMULSION INTRAVENOUS at 07:06

## 2017-06-12 RX ADMIN — PROPOFOL 150 MCG/KG/MIN: 10 INJECTION, EMULSION INTRAVENOUS at 07:06

## 2017-06-12 RX ADMIN — PROPOFOL 50 MG: 10 INJECTION, EMULSION INTRAVENOUS at 07:06

## 2017-06-12 NOTE — H&P (VIEW-ONLY)
"    [facility] Note    Reason for Visit:  The primary encounter diagnosis was Elevated CEA. Diagnoses of Diarrhea, unspecified type and Fatty liver were also pertinent to this visit.    PCP:   Tomas Cabrales   [address] [facility] / [place] [address]    Referring MD:  Adi Reyes Md  [address] [place] [address]    HPI:  This is a 36 y.o. female here for evaluation of elevated CEA levels.  Here with daughter.  Oncology recommends colonoscopy.  Pt states she has a flex sig last month at w.[place] for diarrhea x one month- biopsies were normal. Was dx w/ "bacterial infection" via stool samples. Treated with cipro x 10 days. Currently w/ 4 days per week with 1-7 loose BM/day. Will have bristol type 1 BM with straining once monthly. Reports these types of bowel issues for her entire life. Attempted GFD, but could not tolerate taste of GF items. Never tested for celiac per pt.     Abdominal pain - lower abd cramping w/ urge to have a BM preceding BM, better after BM. This occurs with most BMs. Whole milk makes her nauseated. Reports chronic nausea -currently occurring twice monthly takes Zofran or phenergan PRN.     Reflux -denies  Dysphagia/odynophagia - no   GI bleeding - denies hematochezia, hematemesis, melena, BRBPR, black/tarry stools, and coffee ground emesis  NSAID usage - Excedrin or ibuprofen for HAs twice weekly.     ROS:  Constitutional: No fevers, no chills, No unintentional weight loss, no fatigue,   ENT: No allergies  CV: No chest pain, no palpitations, no perif. edema, no sob on exertion  Pulm: No cough, No shortness of breath, no wheezes, no sputum  Ophtho: No vision changes  GI: see HPI; also +chronic nausea-occurring rarely-takes Zofran or phenergan PRN-twice monthly, no vomiting, no change in appetite  Derm: No rash  Heme: No lymphadenopathy, No bruising  MSK: No arthritis, no muscle pain, no muscle weakness  : No dysuria, No hematuria  Endo: No hot or cold " intolerance  Neuro: No syncope, No seizure,       Medical History:  has a past medical history of Bilateral ovarian cysts (2017); Depression; Diabetes mellitus; Elevated CEA (2017); Female pelvic pain (2017); GERD (gastroesophageal reflux disease); High cholesterol; HLD (hyperlipidemia); Hypertension; Migraine headache; Pre-diabetes; and Right ovarian cyst (2017).    Surgical History:  has a past surgical history that includes  section, low transverse (, ); Ectopic pregnancy surgery (); Partial hysterectomy (3 years ago); Salpingectomy (Right, ); and Hysterectomy ().    Family History: family history includes Depression in her mother; Hypertension in her brother; Migraines in her mother..     Social History:  reports that she has been smoking Cigarettes.  She has a 3.00 pack-year smoking history. She does not have any smokeless tobacco history on file. She reports that she drinks about 1.5 oz of alcohol per week . She reports that she does not use drugs.    Review of patient's allergies indicates:   Allergen Reactions    Morphine Itching       Current Outpatient Prescriptions   Medication Sig    CHANTIX STARTING MONTH BOX 0.5 mg (11)- 1 mg (42) tablet USE AS DIRECTED ON PACKAGE **THANK YOU**    clonazePAM (KLONOPIN) 0.5 MG tablet Take 1 tablet (0.5 mg total) by mouth 2 (two) times daily as needed for Anxiety.    ergocalciferol (VITAMIN D2) 50,000 unit Cap Take 50,000 Units by mouth every 7 days.    fenofibrate micronized (LOFIBRA) 134 MG Cap Take 1 capsule (134 mg total) by mouth once daily.    fluticasone (FLONASE) 50 mcg/actuation nasal spray 1 spray by Each Nare route once daily.    metformin (GLUCOPHAGE) 1000 MG tablet Take 1 tablet (1,000 mg total) by mouth 2 (two) times daily. for diabetes.    norethindrone (MICRONOR) 0.35 mg tablet Take 1 tablet (0.35 mg total) by mouth once daily.    ondansetron (ZOFRAN-ODT) 4 MG TbDL Take 8 mg by mouth every 12  "(twelve) hours.    pravastatin (PRAVACHOL) 20 MG tablet Take 1 tablet (20 mg total) by mouth once daily.    promethazine (PHENERGAN) 25 MG tablet Take 1 tablet (25 mg total) by mouth every 6 (six) hours as needed for Nausea.    sertraline (ZOLOFT) 100 MG tablet TAKE ONE Tablet BY MOUTH EVERY DAY THANK YOU    polyethylene glycol (GOLYTELY,NULYTELY) 236-22.74-6.74 -5.86 gram suspension Take 4,000 mLs (4 L total) by mouth once.     No current facility-administered medications for this visit.        Objective Findings:    Vital Signs:  BP (!) 136/91   Pulse 92   Ht 5' 6" (1.676 m)   Wt 80 kg (176 lb 5.9 oz)   LMP 02/12/2009   BMI 28.47 kg/m²   Body mass index is 28.47 kg/m².    Physical Exam:  General Appearance: Well appearing in no acute distress  Head:   Normocephalic, without obvious abnormality  Eyes:    No scleral icterus, EOMI  ENT: Neck supple, Lips, mucosa, and tongue normal; teeth and gums normal  Lungs: CTA bilaterally in anterior and posterior fields, no wheezes, no crackles.  Heart:  Regular rate and rhythm, S1, S2 normal, no murmurs heard  Abdomen: Soft, non tender, non distended with positive bowel sounds in all four quadrants. No hepatosplenomegaly, ascites, or mass  Extremities: 2+ radial pulses, no clubbing, cyanosis or edema  Skin: No rash to exposed areas  Neurologic: A&Ox4      Labs:  Lab Results   Component Value Date    WBC 10.4 01/10/2017    HGB 15.0 01/10/2017    HCT 44.5 01/10/2017     01/10/2017    CHOL 165 01/10/2017    TRIG 814 (HH) 01/10/2017    HDL 12 (L) 01/10/2017    ALT 34 (H) 04/14/2017    AST 28 04/14/2017     05/30/2017    K 4.1 05/30/2017     05/30/2017    CREATININE 0.8 05/30/2017    BUN 10 05/30/2017    CO2 20 (L) 05/30/2017    TSH 2.200 01/10/2017    HGBA1C 8.4 (H) 04/14/2017       Imaging:  CT chest, abd/pelvis- small pleural nodule-possible granuloma. Fatty liver and hepatomegaly. Right renal cyst. Right ovarian cyst.   Endoscopy:    Per pt, 5/2017 " "flex sig @ [facility] for diarrhea-bx-WNL.  Per pt, 7 years ago colonoscopy @ [facility] for "stomach issues"-diverticulosis.  Assessment:  1. Elevated CEA    2. Diarrhea, unspecified type    3. Fatty liver           Recommendations:  1. Elevated CEA-colonoscopy ordered.  2. Diarrhea- colonoscopy r/o MC. Daily fiber and probiotics as per handout provided. Lactose elimination trial as per handout provided.  3.  Fatty liver- referral to hepatology placed.    Return in about 2 months (around 8/7/2017) for diarrhea..      Order summary:  Orders Placed This Encounter    Ambulatory Referral to Hepatology    Case request GI: COLONOSCOPY         Thank you so much for allowing me to participate in the care of Codi Escoto, APRN, FNP-C      "

## 2017-06-12 NOTE — ANESTHESIA PREPROCEDURE EVALUATION
06/12/2017  Codi Wilkerson is a 36 y.o., female.    Anesthesia Evaluation         Review of Systems  Social:  Smoker   Cardiovascular:   Hypertension    Hepatic/GI:   GERD    Neurological:   Headaches    Endocrine:   Diabetes    Psych:   anxiety          Physical Exam  General:  Well nourished    Airway/Jaw/Neck:  Airway Findings: Mouth Opening: Normal Tongue: Normal  General Airway Assessment: Adult  Mallampati: II  Improves to II with phonation.  TM Distance: Normal, at least 6 cm  Jaw/Neck Findings:  Neck ROM: Normal ROM      Dental:  Dental Findings: In tact   Chest/Lungs:  Chest/Lungs Findings: Clear to auscultation, Normal Respiratory Rate     Heart/Vascular:  Heart Findings: Rate: Normal  Rhythm: Regular Rhythm  Sounds: Normal             Anesthesia Plan  Type of Anesthesia, risks & benefits discussed:  Anesthesia Type:  general  Patient's Preference: General  Intra-op Monitoring Plan:   Intra-op Monitoring Plan Comments:   Post Op Pain Control Plan:   Post Op Pain Control Plan Comments:   Induction:   IV  Beta Blocker:  Patient is not currently on a Beta-Blocker (No further documentation required).       Informed Consent: Patient understands risks and agrees with Anesthesia plan.  Questions answered. Anesthesia consent signed with patient.  ASA Score: 3     Day of Surgery Review of History & Physical: I have interviewed and examined the patient. I have reviewed the patient's H&P dated:  There are no significant changes.          Ready For Surgery From Anesthesia Perspective.

## 2017-06-12 NOTE — TRANSFER OF CARE
"Anesthesia Transfer of Care Note    Patient: Codi Wilkerson    Procedure(s) Performed: Procedure(s) (LRB):  COLONOSCOPY (N/A)    Patient location: GI    Anesthesia Type: general    Transport from OR: Transported from OR on room air with adequate spontaneous ventilation    Post pain: adequate analgesia    Post assessment: no apparent anesthetic complications    Post vital signs: stable    Level of consciousness: awake    Nausea/Vomiting: no nausea/vomiting    Complications: none    Transfer of care protocol was followed      Last vitals:   Visit Vitals  /72 (BP Location: Left arm, Patient Position: Lying, BP Method: Automatic)   Pulse 76   Temp 36.7 °C (98 °F) (Oral)   Resp 16   Ht 5' 6" (1.676 m)   Wt 78.9 kg (174 lb)   LMP 02/12/2009   SpO2 95%   Breastfeeding? No   BMI 28.08 kg/m²     "

## 2017-06-12 NOTE — PATIENT INSTRUCTIONS
Discharge Summary/Instructions after an Endoscopic Procedure  Patient Name: Codi Wilkerson  Patient MRN: [id number]  Patient YOB: 1980 Monday, June 12, 2017  Phill Valencia MD  RESTRICTIONS ON ACTIVITY:  - DO NOT drive a car, operate machinery, make legal/financial decisions, or   drink alcohol until the day after the procedure.    - The following day: return to full activity including work, except no heavy   lifting, straining or running for 3 days if polyps were removed.  - Diet: Eat and drink normally unless instructed otherwise.  TREATMENT FOR COMMON SIDE EFFECTS:  - Mild abdominal pain, bloating or excessive gas: rest, eat lightly and use   a heating pad.  - Sore Throat - treat with throat lozenges. Gargle with warm salt water.  SYMPTOMS TO WATCH FOR AND REPORT TO YOUR PHYSICIAN:  1. Severe abdominal pain or bloating.  2. Pain in chest.  3. Chills or fever occurring within 24 hours after a procedure.  4. A large amount of rectal bleeding, which would show as bright red,   maroon, or black stools. (A small amount of blood from the rectum is not   serious, especially if hemorrhoids are present.)  5. Because air was used during the procedure, expelling large amounts of air   from your rectum or belching is normal.  6. If a bowel prep was taken, you may not have a bowel movement for 1-3   days.  This is normal.  7. Go directly to the emergency room if you notice any of the following:   Chills and/or fever over 101 F   Persistent vomiting   Severe abdominal pain, other than gas cramps   Severe chest pain   Black, tarry stools   Any bleeding - exceeding one tablespoon  Your doctor recommends these additional instructions:  If any biopsies were performed, my office will call you in 5 to 6 business   days with any results.  You have a contact number available for emergencies.  The signs and symptoms   of potential delayed complications were discussed with you.  You may return   to normal activities tomorrow.   Written discharge instructions were   provided to you.   You are being discharged to home.   We are waiting for your pathology results.   Your physician has recommended a repeat colonoscopy at regular screening age   for screening purposes.   The findings and recommendations were discussed with your designated   responsible adult.  For questions, problems or results please call your physician - Phill Valencia MD at Work:  [phone].  [facility], EMERGENCY ROOM PHONE NUMBER: [phone]  IF A COMPLICATION OR EMERGENCY SITUATION ARISES AND YOU ARE UNABLE TO REACH   YOUR PHYSICIAN - GO TO THE EMERGENCY ROOM.  Phill Valencia MD  6/12/2017 7:58:52 AM  This report has been verified and signed electronically.

## 2017-06-12 NOTE — ANESTHESIA POSTPROCEDURE EVALUATION
"Anesthesia Post Evaluation    Patient: Codi Wilkerson    Procedure(s) Performed: Procedure(s) (LRB):  COLONOSCOPY (N/A)    Final Anesthesia Type: general  Patient location during evaluation: PACU  Patient participation: Yes- Able to Participate  Level of consciousness: awake and alert  Post-procedure vital signs: reviewed and stable  Pain management: adequate  Airway patency: patent  PONV status at discharge: No PONV  Anesthetic complications: no      Cardiovascular status: blood pressure returned to baseline and stable  Respiratory status: unassisted  Hydration status: euvolemic  Follow-up not needed.        Visit Vitals  /77 (BP Location: Left arm, Patient Position: Sitting, BP Method: Automatic)   Pulse 78   Temp 36.7 °C (98 °F) (Oral)   Resp 18   Ht 5' 6" (1.676 m)   Wt 78.9 kg (174 lb)   LMP 02/12/2009   SpO2 97%   Breastfeeding? No   BMI 28.08 kg/m²       Pain/Graciela Score: Pain Assessment Performed: Yes (6/12/2017  8:35 AM)  Presence of Pain: denies (6/12/2017  8:35 AM)  Graciela Score: 10 (6/12/2017  8:05 AM)      "

## 2017-06-14 ENCOUNTER — PATIENT MESSAGE (OUTPATIENT)
Dept: GASTROENTEROLOGY | Facility: CLINIC | Age: 37
End: 2017-06-14

## 2017-06-14 NOTE — TELEPHONE ENCOUNTER
[facility]

## 2017-06-19 ENCOUNTER — TELEPHONE (OUTPATIENT)
Dept: ENDOSCOPY | Facility: HOSPITAL | Age: 37
End: 2017-06-19

## 2017-06-29 ENCOUNTER — TELEPHONE (OUTPATIENT)
Dept: GYNECOLOGIC ONCOLOGY | Facility: CLINIC | Age: 37
End: 2017-06-29

## 2017-06-29 ENCOUNTER — DOCUMENTATION ONLY (OUTPATIENT)
Dept: GYNECOLOGIC ONCOLOGY | Facility: CLINIC | Age: 37
End: 2017-06-29

## 2017-06-29 NOTE — TELEPHONE ENCOUNTER
I called patient to review her results.  Her colonoscopy was negative.  It is believed that her mildly matt and perhaps fatty liver.     I discussed with the patient that once she stops smoking a repeat CEA should be done.    Patient voiced understanding.  I told her that at this time I did not believe any treatment from my standpoint.

## 2017-07-01 ENCOUNTER — HOSPITAL ENCOUNTER (EMERGENCY)
Facility: OTHER | Age: 37
Discharge: HOME | End: 2017-07-01
Attending: EMERGENCY MEDICINE
Payer: MEDICAID

## 2017-07-01 VITALS
SYSTOLIC BLOOD PRESSURE: 127 MMHG | HEIGHT: 66 IN | RESPIRATION RATE: 18 BRPM | TEMPERATURE: 97 F | WEIGHT: 174 LBS | BODY MASS INDEX: 27.97 KG/M2 | OXYGEN SATURATION: 99 % | HEART RATE: 91 BPM | DIASTOLIC BLOOD PRESSURE: 88 MMHG

## 2017-07-01 DIAGNOSIS — R10.9 RIGHT FLANK PAIN: ICD-10-CM

## 2017-07-01 DIAGNOSIS — S23.9XXA THORACIC BACK SPRAIN, INITIAL ENCOUNTER: Primary | ICD-10-CM

## 2017-07-01 LAB
BILIRUBIN, POC UA: NEGATIVE
BLOOD, POC UA: NEGATIVE
CLARITY, POC UA: CLEAR
COLOR, POC UA: YELLOW
GLUCOSE, POC UA: ABNORMAL
KETONES, POC UA: NEGATIVE
LEUKOCYTE EST, POC UA: NEGATIVE
NITRITE, POC UA: NEGATIVE
PH UR STRIP: 5.5 [PH]
POCT GLUCOSE: 270 MG/DL (ref 70–110)
PROTEIN, POC UA: ABNORMAL
SPECIFIC GRAVITY, POC UA: >=1.03
UROBILINOGEN, POC UA: 0.2 E.U./DL

## 2017-07-01 PROCEDURE — 25000003 PHARM REV CODE 250: Performed by: EMERGENCY MEDICINE

## 2017-07-01 PROCEDURE — 99284 EMERGENCY DEPT VISIT MOD MDM: CPT | Mod: 25

## 2017-07-01 PROCEDURE — 63600175 PHARM REV CODE 636 W HCPCS: Performed by: EMERGENCY MEDICINE

## 2017-07-01 PROCEDURE — 81003 URINALYSIS AUTO W/O SCOPE: CPT

## 2017-07-01 PROCEDURE — 96372 THER/PROPH/DIAG INJ SC/IM: CPT

## 2017-07-01 RX ORDER — DIAZEPAM 5 MG/1
10 TABLET ORAL EVERY 6 HOURS PRN
Qty: 15 TABLET | Refills: 0 | Status: SHIPPED | OUTPATIENT
Start: 2017-07-01 | End: 2020-02-03

## 2017-07-01 RX ORDER — OXYCODONE AND ACETAMINOPHEN 5; 325 MG/1; MG/1
2 TABLET ORAL
Status: COMPLETED | OUTPATIENT
Start: 2017-07-01 | End: 2017-07-01

## 2017-07-01 RX ORDER — KETOROLAC TROMETHAMINE 30 MG/ML
60 INJECTION, SOLUTION INTRAMUSCULAR; INTRAVENOUS
Status: COMPLETED | OUTPATIENT
Start: 2017-07-01 | End: 2017-07-01

## 2017-07-01 RX ORDER — INDOMETHACIN 25 MG/1
50 CAPSULE ORAL
Qty: 30 CAPSULE | Refills: 0 | Status: SHIPPED | OUTPATIENT
Start: 2017-07-01 | End: 2018-02-03

## 2017-07-01 RX ORDER — DIAZEPAM 5 MG/1
10 TABLET ORAL
Status: COMPLETED | OUTPATIENT
Start: 2017-07-01 | End: 2017-07-01

## 2017-07-01 RX ADMIN — OXYCODONE AND ACETAMINOPHEN 2 TABLET: 5; 325 TABLET ORAL at 07:07

## 2017-07-01 RX ADMIN — KETOROLAC TROMETHAMINE 60 MG: 30 INJECTION, SOLUTION INTRAMUSCULAR at 07:07

## 2017-07-01 RX ADMIN — DIAZEPAM 10 MG: 5 TABLET ORAL at 07:07

## 2017-07-01 NOTE — ED NOTES
Low Back Pain: Patient complains of  low back pain. This is evaluated as a personal injury. The patient first noted symptoms 2days ago. It was not related to a twisting movement. The pain is rated moderate, and is located at the right lumbar area. The pain is described as aching, soreness and spasm and occurs all day. The symptoms has been progressive. Symptoms are exacerbated by deep breathing, lifting, lying down, standing and twisting. Factors which relieve the pain include nothing.

## 2017-07-02 NOTE — ED PROVIDER NOTES
"Encounter Date: 2017       History     Chief Complaint   Patient presents with    Back Pain     + right sided back pain. patient reports she tried to crack her back by twisting and now it feels like she is having constant spasms.       chief complaint: Back pain.      Patient reports "pain" to her right flank area.  Patient said that she twisted her back in order to crack it 2 nights ago.  Last night, she began having the severe pain.  The pain got worse today.  She's been takING anti-inflammatories without improvement.  The pain worsens when she moves or when she lies flat.  She has not had a cough or fever.  No chest pain.  Patient rates her pain as 10 out of 10.  No previous history of back problems.  No trauma.  Patient says she feels like she cracked a rib          Review of patient's allergies indicates:   Allergen Reactions    Morphine Itching     Past Medical History:   Diagnosis Date    Bilateral ovarian cysts 2017    Depression     Diabetes mellitus     type 2    Elevated CEA 2017    Female pelvic pain 2017    GERD (gastroesophageal reflux disease)     High cholesterol     HLD (hyperlipidemia)     Hypertension     Migraine headache     Pre-diabetes     Right ovarian cyst 2017     Past Surgical History:   Procedure Laterality Date     SECTION, LOW TRANSVERSE  ,     COLONOSCOPY N/A 2017    Procedure: COLONOSCOPY;  Surgeon: Phill Valencia MD;  Location: [address]);  Service: Endoscopy;  Laterality: N/A;    ECTOPIC PREGNANCY SURGERY      laparotomy     HYSTERECTOMY  2009    supracervical LH.     PARTIAL HYSTERECTOMY  3 years ago    SALPINGECTOMY Right      Family History   Problem Relation Age of Onset    Depression Mother     Migraines Mother     Hypertension Brother     Colon cancer Neg Hx     Esophageal cancer Neg Hx     Crohn's disease Neg Hx     Irritable bowel syndrome Neg Hx     Stomach cancer Neg Hx     Rectal cancer " Neg Hx     Ulcerative colitis Neg Hx     Celiac disease Neg Hx      Social History   Substance Use Topics    Smoking status: Current Some Day Smoker     Packs/day: 1.00     Years: 3.00     Types: Cigarettes    Smokeless tobacco: Never Used    Alcohol use 1.5 oz/week     3 Standard drinks or equivalent per week      Comment: Just during special occasions     Review of Systems   Constitutional: Negative for fever.   HENT: Negative for sore throat.    Respiratory: Negative for shortness of breath.    Cardiovascular: Negative for chest pain.   Gastrointestinal: Negative for nausea.   Genitourinary: Negative for dysuria.   Musculoskeletal: Positive for back pain.   Skin: Negative for rash.   Neurological: Negative for weakness.   Hematological: Does not bruise/bleed easily.       Physical Exam     Initial Vitals [07/01/17 1822]   BP Pulse Resp Temp SpO2   (!) 138/94 98 20 96.9 °F (36.1 °C) 96 %      MAP       108.67         Physical Exam    Nursing note and vitals reviewed.  Constitutional: She appears well-developed and well-nourished. She appears distressed.   HENT:   Head: Normocephalic and atraumatic.   Eyes: Conjunctivae and EOM are normal. Pupils are equal, round, and reactive to light.   Neck: Normal range of motion. Neck supple.   Cardiovascular: Normal rate and regular rhythm.   Pulmonary/Chest: Breath sounds normal.   Abdominal: Soft. There is no tenderness. There is no rebound and no guarding.   Musculoskeletal: Normal range of motion. She exhibits tenderness (right flank area).        Back:    Neurological: She is alert and oriented to person, place, and time. She has normal strength. No sensory deficit.   Skin: Skin is warm and dry.   Psychiatric: She has a normal mood and affect.         ED Course   Procedures  Labs Reviewed   POCT URINALYSIS W/O SCOPE - Abnormal; Notable for the following:        Result Value    Glucose, UA 3+ (*)     Bilirubin, UA Negative (*)     Ketones, UA Negative (*)     Spec  "Grav UA >=1.030 (*)     Blood, UA Negative (*)     Protein, UA 1+ (*)     Nitrite, UA Negative (*)     Leukocytes, UA Negative (*)     All other components within normal limits   POCT GLUCOSE - Abnormal; Notable for the following:     POCT Glucose 270 (*)     All other components within normal limits   POCT URINALYSIS W/O SCOPE             Medical Decision Making:   Initial Assessment:   36-year-old who complains of pain to her right back area.  Patient twisted her back when trying to "crack" IT..  She appears in moderate distress.  No neurological deficits  Clinical Tests:   Radiological Study: Ordered and Reviewed  ED Management:  Patient says she feels like she broke her rib.  Rib x-rays will be done.  Patient was treated with Valium, Percocet and Toradol.    No fracture seen on x-ray.  Patient will be discharged on Valium.  She has tramadol at home and was advised to take that as well as indomethacin which is prescribed                   ED Course     Clinical Impression:   The primary encounter diagnosis was Thoracic back sprain, initial encounter. A diagnosis of Right flank pain was also pertinent to this visit.                           Lorie Paz MD  07/01/17 2030    "

## 2017-07-05 ENCOUNTER — TELEPHONE (OUTPATIENT)
Dept: GYNECOLOGIC ONCOLOGY | Facility: CLINIC | Age: 37
End: 2017-07-05

## 2017-07-05 DIAGNOSIS — R91.1 LESION OF RIGHT LUNG: ICD-10-CM

## 2017-07-05 NOTE — TELEPHONE ENCOUNTER
----- Message from Adi Reyes MD sent at 7/5/2017  9:00 AM CDT -----  Please call patient and schedule her for a CT scan of the chest for Oct 2017. Please let me know once scan has been scheduled.   Thank you.    
Head is atraumatic. Head shape is symmetrical.

## 2017-07-11 ENCOUNTER — TELEPHONE (OUTPATIENT)
Dept: GYNECOLOGIC ONCOLOGY | Facility: CLINIC | Age: 37
End: 2017-07-11

## 2017-07-11 NOTE — TELEPHONE ENCOUNTER
----- Message from Adi Reyes MD sent at 7/5/2017  9:00 AM CDT -----  Please call patient and schedule her for a CT scan of the chest for Oct 2017. Please let me know once scan has been scheduled.   Thank you.

## 2017-09-06 ENCOUNTER — TELEPHONE (OUTPATIENT)
Dept: TRANSPLANT | Facility: CLINIC | Age: 37
End: 2017-09-06

## 2017-09-06 ENCOUNTER — DOCUMENTATION ONLY (OUTPATIENT)
Dept: TRANSPLANT | Facility: CLINIC | Age: 37
End: 2017-09-06

## 2017-09-06 NOTE — LETTER
September 6, 2017    Mely Escoto NP  [address] [facility] [address]      Dear Dr. Escoto    Patient: Codi Wilkerson   MR Number: [id number]   YOB: 1980     Thank you for the referral of Codi Wilkerson to the [facility] program. An initial appointment will be scheduled for your patient with one of our Hepatologists.      Thank you again for your trust in our program.  If there is anything we can do for you or your staff, please feel free to contact us.        Sincerely,        [facility]  [facility] [place] [address]  [phone]

## 2017-09-06 NOTE — LETTER
September 6, 2017    Codi Wilkerson  [address] [facility] [address]      Dear Codi Wilkerson:    Your doctor has referred you to the [facility]. You will be contacted by our office and an initial appointment will then be scheduled for you.    We look forward to seeing you soon. If you have any further questions, please contact us at [phone].       Sincerely,        [facility]   [address] [place] [id number]  [phone]

## 2017-09-06 NOTE — NURSING
Pt records reviewed.  Pt will be referred to Hepatology due to   Fatty liver and hepatomegaly  Initial referral received  from Mely Escoto NP 's  office.   Referral letter sent to provider and patient.  Pt records reviewed.

## 2017-09-06 NOTE — TELEPHONE ENCOUNTER
Patient called and informed that her referral has been processed and and someone will be calling her to  assist with setting  up an appointment

## 2017-09-06 NOTE — TELEPHONE ENCOUNTER
----- Message from Angelita Reagan sent at 9/6/2017  8:11 AM CDT -----  Contact: pt  Pt has new insurance [employer] and needs to set up a new pt appt please call her @ # [phone]

## 2017-11-03 ENCOUNTER — TELEPHONE (OUTPATIENT)
Dept: GYNECOLOGIC ONCOLOGY | Facility: CLINIC | Age: 37
End: 2017-11-03

## 2017-11-03 NOTE — TELEPHONE ENCOUNTER
Left message reminding patient that she needs to have a repeat CT scan of the chest. I asked her to call to schedule.

## 2018-02-03 ENCOUNTER — HOSPITAL ENCOUNTER (EMERGENCY)
Facility: OTHER | Age: 38
Discharge: HOME | End: 2018-02-03
Attending: EMERGENCY MEDICINE

## 2018-02-03 VITALS
DIASTOLIC BLOOD PRESSURE: 87 MMHG | BODY MASS INDEX: 28.61 KG/M2 | WEIGHT: 178 LBS | OXYGEN SATURATION: 99 % | HEIGHT: 66 IN | SYSTOLIC BLOOD PRESSURE: 132 MMHG | HEART RATE: 88 BPM | RESPIRATION RATE: 18 BRPM | TEMPERATURE: 98 F

## 2018-02-03 DIAGNOSIS — M25.579 PAIN, ANKLE: ICD-10-CM

## 2018-02-03 DIAGNOSIS — R52 PAIN: ICD-10-CM

## 2018-02-03 DIAGNOSIS — M25.571 ACUTE RIGHT ANKLE PAIN: Primary | ICD-10-CM

## 2018-02-03 LAB — POCT GLUCOSE: 256 MG/DL (ref 70–110)

## 2018-02-03 PROCEDURE — 99284 EMERGENCY DEPT VISIT MOD MDM: CPT | Mod: 25

## 2018-02-03 PROCEDURE — 96372 THER/PROPH/DIAG INJ SC/IM: CPT

## 2018-02-03 PROCEDURE — 63600175 PHARM REV CODE 636 W HCPCS: Performed by: EMERGENCY MEDICINE

## 2018-02-03 RX ORDER — DICLOFENAC SODIUM 10 MG/G
2 GEL TOPICAL 4 TIMES DAILY
Qty: 1 TUBE | Refills: 0 | Status: SHIPPED | OUTPATIENT
Start: 2018-02-03 | End: 2020-02-03

## 2018-02-03 RX ORDER — VILAZODONE HYDROCHLORIDE 20 MG/1
TABLET ORAL
COMMUNITY
End: 2020-02-03

## 2018-02-03 RX ORDER — KETOROLAC TROMETHAMINE 30 MG/ML
30 INJECTION, SOLUTION INTRAMUSCULAR; INTRAVENOUS
Status: COMPLETED | OUTPATIENT
Start: 2018-02-03 | End: 2018-02-03

## 2018-02-03 RX ORDER — METHOCARBAMOL 750 MG/1
1500 TABLET, FILM COATED ORAL 3 TIMES DAILY
Qty: 30 TABLET | Refills: 0 | Status: SHIPPED | OUTPATIENT
Start: 2018-02-03 | End: 2018-02-08

## 2018-02-03 RX ORDER — NAPROXEN 500 MG/1
500 TABLET ORAL 2 TIMES DAILY WITH MEALS
Qty: 20 TABLET | Refills: 0 | Status: SHIPPED | OUTPATIENT
Start: 2018-02-03 | End: 2020-02-03

## 2018-02-03 RX ADMIN — KETOROLAC TROMETHAMINE 30 MG: 30 INJECTION, SOLUTION INTRAMUSCULAR at 01:02

## 2018-02-03 NOTE — ED NOTES
Patient has verified the spelling of their name and  on armband    LOC: The patient is awake, alert, and aware of environment with an appropriate affect, the patient is oriented x 4 and speaking appropriately.     APPEARANCE: Patient resting comfortably and in no acute distress, patient is clean and well groomed, patient's clothing is properly fastened.     RESPIRATORY: Airway is open and patent, respirations are spontaneous, patient has a normal effort and rate, no accessory muscle use noted, bilateral breath sounds clear, denies SOB     CARDIAC:  No chest pain.     SKIN: The skin is warm and dry, color consistent with ethnicity, patient has normal skin turgor and moist mucus membranes, skin intact, no breakdown or bruising noted.     NEUROLOGICAL: Dorsalis pedis pulses palpable upon assessment.     MUSCULOSKELETAL: +Right ankle pain with ambulation. Pain radiating up leg.     COMPLAINT: Patient complain of right ankle pain x 3 weeks. Denies any recent trauma

## 2018-02-12 NOTE — ED PROVIDER NOTES
"Encounter Date: 2/3/2018       History     Chief Complaint   Patient presents with    Ankle Pain     x2-3 weeks, right ankle pain, shooting pain up to mid calf. Pt denies injury, states that now she feels a "bump" under the medial part of ankle. No deformity/swelling noted. Pt ambulatory, states pain has prgressively been worsening     Codi Wilkerson is a 37 y.o. female who presents to the Emergency Department with  shooting right ankle pain.  Pain shoots up the leg.  Patient states pains and present for the last few weeks.  Patient is worried that she has cancer in her ankle.  She does have a hard lump on her ankle.  Patient does not recall an injury.      The history is provided by the patient.     Review of patient's allergies indicates:   Allergen Reactions    Morphine Itching     Past Medical History:   Diagnosis Date    Bilateral ovarian cysts 2017    Depression     Diabetes mellitus     type 2    Elevated CEA 2017    Female pelvic pain 2017    GERD (gastroesophageal reflux disease)     High cholesterol     HLD (hyperlipidemia)     Hypertension     Lesion of right lung 2017    Migraine headache     Pre-diabetes     Right ovarian cyst 2017     Past Surgical History:   Procedure Laterality Date     SECTION, LOW TRANSVERSE  ,     COLONOSCOPY N/A 2017    Procedure: COLONOSCOPY;  Surgeon: Phill Valencia MD;  [facility] ([address] [place] [address]);  Service: Endoscopy;  Laterality: N/A;    ECTOPIC PREGNANCY SURGERY      laparotomy     HYSTERECTOMY  2009    supracervical LH.     PARTIAL HYSTERECTOMY  3 years ago    SALPINGECTOMY Right      Family History   Problem Relation Age of Onset    Depression Mother     Migraines Mother     Hypertension Brother     Colon cancer Neg Hx     Esophageal cancer Neg Hx     Crohn's disease Neg Hx     Irritable bowel syndrome Neg Hx     Stomach cancer Neg Hx     Rectal cancer Neg Hx     Ulcerative colitis Neg Hx  "    Celiac disease Neg Hx      Social History   Substance Use Topics    Smoking status: Current Some Day Smoker     Packs/day: 1.00     Years: 3.00     Types: Cigarettes    Smokeless tobacco: Never Used    Alcohol use 1.5 oz/week     3 Standard drinks or equivalent per week      Comment: Just during special occasions     Review of Systems   Constitutional: Negative for fever.   HENT: Negative for sore throat.    Respiratory: Negative for shortness of breath.    Cardiovascular: Negative for chest pain.   Gastrointestinal: Negative for nausea.   Genitourinary: Negative for dysuria.   Musculoskeletal: Positive for arthralgias. Negative for back pain.   Skin: Negative for rash.   Neurological: Negative for weakness.   Hematological: Does not bruise/bleed easily.   All other systems reviewed and are negative.      Physical Exam     Initial Vitals [02/03/18 1152]   BP Pulse Resp Temp SpO2   (!) 134/91 98 18 98.6 °F (37 °C) 98 %      MAP       105.33         Physical Exam    Nursing note and vitals reviewed.  Constitutional: She appears well-developed and well-nourished.   HENT:   Head: Normocephalic and atraumatic.   Right Ear: External ear normal.   Left Ear: External ear normal.   Nose: Nose normal.   Eyes: Conjunctivae and EOM are normal. Pupils are equal, round, and reactive to light.   Neck: Normal range of motion. Neck supple.   Cardiovascular: Normal rate, regular rhythm, normal heart sounds and intact distal pulses. Exam reveals no gallop and no friction rub.    No murmur heard.  Pulmonary/Chest: Breath sounds normal. No respiratory distress. She has no wheezes. She has no rhonchi. She has no rales. She exhibits no tenderness.   Abdominal: Soft. Bowel sounds are normal. She exhibits no distension and no mass. There is no tenderness. There is no rebound and no guarding.   Musculoskeletal: Normal range of motion. She exhibits tenderness. She exhibits no edema.        Right hip: Normal.        Left hip: Normal.         Right knee: Normal.        Left knee: Normal.        Right ankle: Normal.        Left ankle: She exhibits normal range of motion, no swelling, no deformity, no laceration and normal pulse. Tenderness. Medial malleolus tenderness found.        Feet:    Neurological: She is alert and oriented to person, place, and time. She has normal strength and normal reflexes. She displays normal reflexes. No cranial nerve deficit or sensory deficit.   Skin: Skin is warm and dry.   Psychiatric: She has a normal mood and affect.         ED Course   Procedures  Labs Reviewed   POCT GLUCOSE - Abnormal; Notable for the following:        Result Value    POCT Glucose 256 (*)     All other components within normal limits        Imaging Results          X-Ray Ankle Complete Right (Final result)  Result time 02/03/18 13:18:00    Final result by Yonatan Srinivasan MD (02/03/18 13:18:00)                 Impression:        No acute displaced fracture or dislocation identified.      Electronically signed by: YONATAN SRINIVASAN MD, MD  Date:     02/03/18  Time:    13:18              Narrative:    COMPARISON: None    FINDINGS: 3 views right ankle.        Bones are well mineralized. Alignment is within normal limits. Ankle mortise appears intact.  No acute displaced fracture, dislocation, or destructive osseous process identified.  The joint spaces appear relatively maintained.   No subcutaneous emphysema or radiodense retained foreign body.                                                   ED Course      Clinical Impression:   The primary encounter diagnosis was Acute right ankle pain. Diagnoses of Pain and Pain, ankle were also pertinent to this visit.                           Colette Hung DO  02/14/18 0710

## 2020-01-20 ENCOUNTER — TELEPHONE (OUTPATIENT)
Dept: INTERNAL MEDICINE | Facility: CLINIC | Age: 40
End: 2020-01-20

## 2020-01-20 NOTE — TELEPHONE ENCOUNTER
----- Message from Zulma Claire sent at 1/20/2020  2:11 PM CST -----  Contact: Pt  Pt called to speak to the nurse regarding her care and would like a call back today to also have her diabetes level checked and pt can be reached at [phone]

## 2020-01-20 NOTE — TELEPHONE ENCOUNTER
Spoke to patient. Patient will be scheduled for a sooner appointment with PCP for follow up/diabetes on 02/03 at 8 am. Appointment in March that was initially set up will be cancelled in system.

## 2020-01-27 ENCOUNTER — OFFICE VISIT (OUTPATIENT)
Dept: URGENT CARE | Facility: CLINIC | Age: 40
End: 2020-01-27
Payer: COMMERCIAL

## 2020-01-27 VITALS
HEIGHT: 66 IN | OXYGEN SATURATION: 96 % | WEIGHT: 176 LBS | SYSTOLIC BLOOD PRESSURE: 137 MMHG | BODY MASS INDEX: 28.28 KG/M2 | RESPIRATION RATE: 16 BRPM | DIASTOLIC BLOOD PRESSURE: 88 MMHG | TEMPERATURE: 99 F | HEART RATE: 89 BPM

## 2020-01-27 DIAGNOSIS — L60.0 INGROWN NAIL OF GREAT TOE OF RIGHT FOOT: Primary | ICD-10-CM

## 2020-01-27 DIAGNOSIS — L03.90 CELLULITIS, UNSPECIFIED CELLULITIS SITE: ICD-10-CM

## 2020-01-27 PROCEDURE — 99214 OFFICE O/P EST MOD 30 MIN: CPT | Mod: S$GLB,,, | Performed by: NURSE PRACTITIONER

## 2020-01-27 PROCEDURE — 99214 PR OFFICE/OUTPT VISIT, EST, LEVL IV, 30-39 MIN: ICD-10-PCS | Mod: S$GLB,,, | Performed by: NURSE PRACTITIONER

## 2020-01-27 RX ORDER — GLIMEPIRIDE 2 MG/1
2 TABLET ORAL
COMMUNITY
End: 2020-02-03 | Stop reason: SDUPTHER

## 2020-01-27 RX ORDER — MUPIROCIN 20 MG/G
OINTMENT TOPICAL 3 TIMES DAILY
Qty: 30 G | Refills: 0 | Status: SHIPPED | OUTPATIENT
Start: 2020-01-27 | End: 2020-09-02

## 2020-01-27 RX ORDER — CLINDAMYCIN HYDROCHLORIDE 300 MG/1
300 CAPSULE ORAL 3 TIMES DAILY
Qty: 30 CAPSULE | Refills: 0 | Status: SHIPPED | OUTPATIENT
Start: 2020-01-27 | End: 2020-02-06

## 2020-01-27 RX ORDER — LURASIDONE HYDROCHLORIDE 40 MG/1
80 TABLET, FILM COATED ORAL DAILY
COMMUNITY
End: 2021-01-05

## 2020-01-27 RX ORDER — CLONAZEPAM 0.5 MG/1
0.5 TABLET ORAL 2 TIMES DAILY PRN
COMMUNITY
End: 2023-11-03

## 2020-01-28 NOTE — PATIENT INSTRUCTIONS
Please return here or go to the Emergency Department for any concerns or worsening of condition.  If you were prescribed antibiotics, please take them to completion.  If you were prescribed a narcotic medication, do not drive or operate heavy equipment or machinery while taking these medications.  Please follow up with your primary care doctor or specialist as needed.  If you  smoke, please stop smoking.  Discharge Instructions for Cellulitis  You have been diagnosed with cellulitis. This is an infection in the deepest layer of the skin. In some cases, the infection also affects the muscle. Cellulitis is caused by bacteria. The bacteria can enter the body through broken skin. This can happen with a cut, scratch, animal bite, or an insect bite that has been scratched. You may have been treated in the hospital with antibiotics and fluids. You will likely be given a prescription for antibiotics to take at home. This sheet will help you take care of yourself at home.  Home care  When you are home:  · Take the prescribed antibiotic medicine you are given as directed until it is gone. Take it even if you feel better. It treats the infection and stops it from returning. Not taking all the medicine can make future infections hard to treat.  · Keep the infected area clean.  · When possible, raise the infected area above the level of your heart. This helps keep swelling down.  · Talk with your healthcare provider if you are in pain. Ask what kind of over-the-counter medicine you can take for pain.  · Apply clean bandages as advised.  · Take your temperature once a day for a week.  · Wash your hands often to prevent spreading the infection.  In the future, wash your hands before and after you touch cuts, scratches, or bandages. This will help prevent infection.   When to call your healthcare provider  Call your healthcare provider immediately if you have any of the following:  · Difficulty or pain when moving the joints above  or below the infected area  · Discharge or pus draining from the area  · Fever of 100.4°F (38°C) or higher, or as directed by your healthcare provider  · Pain that gets worse in or around the infected   · Redness that gets worse in or around the infected area, particularly if the area of redness expands to a wider area  · Shaking chills  · Swelling of the infected area  · Vomiting   Date Last Reviewed: 8/1/2016  © 8241-7368 [employer]. [address] [place] [address]. All rights reserved. This information is not intended as a substitute for professional medical care. Always follow your healthcare professional's instructions.        Understanding Ingrown Toenails    An ingrown nail is the result of a nail growing into the skin that surrounds it. This often occurs at either edge of the big toe. Ingrown nails may be caused by improper trimming, inherited nail deformities, injuries, fungal infections, or pressure.  Symptoms  Ingrown nails may cause pain at the tip of the toe or all the way to the base of the toe. The pain is often worse while walking. An ingrown nail may also lead to infection, inflammation, or a more serious condition. If its infected, you might see pus or redness.  Evaluation  To determine the extent of your problem, your healthcare provider examines and possibly presses the painful area. If other problems are suspected, blood tests, cultures, and X-rays may be done as well.  Treatment  If the nail isnt infected, your healthcare provider may trim the corner of it to help relieve your symptoms. He or she may need to remove one side of your nail back to the cuticle. The base of the nail may then be treated with a chemical to keep the ingrown part from growing back. Severe infections or ingrown nails may require antibiotics and temporary or permanent removal of a portion of the nail. To prevent pain, a local anesthetic may be used in these procedures. This treatment is usually  done at your healthcare provider's office.  Prevention  Many nail problems can be prevented by wearing the right shoes and trimming your nails properly. To help avoid infection, keep your feet clean and dry. If you have diabetes, talk with your healthcare provider before doing any foot self-care.  · The right shoes: Get your feet measured (your size may change as you age). Wear shoes that are supportive and roomy enough for your toes to wiggle. Look for shoes made of natural materials such as leather, which allow your feet to breathe.  · Proper trimming: To avoid problems, trim your toenails straight across without cutting down into the corners. If you cant trim your own nails, ask your healthcare provider to do so for you.  Date Last Reviewed: 10/1/2016  © 9305-0988 [employer]. [address] [place] [address], [place] [address]. All rights reserved. This information is not intended as a substitute for professional medical care. Always follow your healthcare professional's instructions.

## 2020-01-28 NOTE — PROGRESS NOTES
"Subjective:       Patient ID: Codi Wilkerson is a 39 y.o. female.    Vitals:  height is 5' 6" (1.676 m) and weight is 79.8 kg (176 lb). Her temperature is 98.8 °F (37.1 °C). Her blood pressure is 137/88 and her pulse is 89. Her respiration is 16 and oxygen saturation is 96%.     Chief Complaint: Toe Pain    Pt states about a month ago she had an ingrown toenail on her right big toe which she yanked out herself. Just this past week the ingrown grew back and she dug it out again and now her toe looks infected. She states it's red and painful and there was pus coming out. She soaked it with peroxide. Patient is diabettic    Toe Pain    The incident occurred 5 to 7 days ago. The incident occurred at home. There was no injury mechanism. The pain is present in the right toes (right big toe). Treatments tried: soaked with peroxide.       Constitution: Negative for chills, fatigue and fever.   HENT: Negative for congestion and sore throat.    Neck: Negative for painful lymph nodes.   Cardiovascular: Negative for chest pain and leg swelling.   Eyes: Negative for double vision and blurred vision.   Respiratory: Negative for cough and shortness of breath.    Gastrointestinal: Negative for nausea, vomiting and diarrhea.   Genitourinary: Negative for dysuria, frequency, urgency and history of kidney stones.   Musculoskeletal: Positive for pain. Negative for joint pain, joint swelling, muscle cramps and muscle ache.   Skin: Negative for color change, pale, rash, erythema and bruising.   Allergic/Immunologic: Negative for seasonal allergies.   Neurological: Negative for dizziness, history of vertigo, light-headedness, passing out and headaches.   Hematologic/Lymphatic: Negative for swollen lymph nodes.   Psychiatric/Behavioral: Negative for nervous/anxious, sleep disturbance and depression. The patient is not nervous/anxious.        Objective:      Physical Exam   Constitutional: She is oriented to person, place, and time. She " appears well-developed and well-nourished.   HENT:   Head: Normocephalic and atraumatic. Head is without abrasion, without contusion and without laceration.   Right Ear: External ear normal.   Left Ear: External ear normal.   Nose: Nose normal.   Mouth/Throat: Oropharynx is clear and moist and mucous membranes are normal.   Eyes: Pupils are equal, round, and reactive to light. Conjunctivae, EOM and lids are normal.   Neck: Trachea normal, full passive range of motion without pain and phonation normal. Neck supple.   Cardiovascular: Normal rate, regular rhythm and normal heart sounds.   Pulmonary/Chest: Effort normal and breath sounds normal. No stridor. No respiratory distress.   Musculoskeletal: Normal range of motion.        Feet:    Neurological: She is alert and oriented to person, place, and time.   Skin: Skin is warm, dry, intact and no rash. Capillary refill takes less than 2 seconds. abrasion, burn, bruising, erythema and ecchymosis  Psychiatric: She has a normal mood and affect. Her speech is normal and behavior is normal. Judgment and thought content normal. Cognition and memory are normal.   Nursing note and vitals reviewed.        Assessment:       1. Ingrown nail of great toe of right foot    2. Cellulitis, unspecified cellulitis site        Plan:         Ingrown nail of great toe of right foot  -     clindamycin (CLEOCIN) 300 MG capsule; Take 1 capsule (300 mg total) by mouth 3 (three) times daily. for 10 days  Dispense: 30 capsule; Refill: 0  -     mupirocin (BACTROBAN) 2 % ointment; Apply topically 3 (three) times daily.  Dispense: 30 g; Refill: 0    Cellulitis, unspecified cellulitis site  -     clindamycin (CLEOCIN) 300 MG capsule; Take 1 capsule (300 mg total) by mouth 3 (three) times daily. for 10 days  Dispense: 30 capsule; Refill: 0  -     mupirocin (BACTROBAN) 2 % ointment; Apply topically 3 (three) times daily.  Dispense: 30 g; Refill: 0      Patient Instructions     Please return here or go  to the Emergency Department for any concerns or worsening of condition.  If you were prescribed antibiotics, please take them to completion.  If you were prescribed a narcotic medication, do not drive or operate heavy equipment or machinery while taking these medications.  Please follow up with your primary care doctor or specialist as needed.  If you  smoke, please stop smoking.  Discharge Instructions for Cellulitis  You have been diagnosed with cellulitis. This is an infection in the deepest layer of the skin. In some cases, the infection also affects the muscle. Cellulitis is caused by bacteria. The bacteria can enter the body through broken skin. This can happen with a cut, scratch, animal bite, or an insect bite that has been scratched. You may have been treated in the hospital with antibiotics and fluids. You will likely be given a prescription for antibiotics to take at home. This sheet will help you take care of yourself at home.  Home care  When you are home:  · Take the prescribed antibiotic medicine you are given as directed until it is gone. Take it even if you feel better. It treats the infection and stops it from returning. Not taking all the medicine can make future infections hard to treat.  · Keep the infected area clean.  · When possible, raise the infected area above the level of your heart. This helps keep swelling down.  · Talk with your healthcare provider if you are in pain. Ask what kind of over-the-counter medicine you can take for pain.  · Apply clean bandages as advised.  · Take your temperature once a day for a week.  · Wash your hands often to prevent spreading the infection.  In the future, wash your hands before and after you touch cuts, scratches, or bandages. This will help prevent infection.   When to call your healthcare provider  Call your healthcare provider immediately if you have any of the following:  · Difficulty or pain when moving the joints above or below the infected  area  · Discharge or pus draining from the area  · Fever of 100.4°F (38°C) or higher, or as directed by your healthcare provider  · Pain that gets worse in or around the infected   · Redness that gets worse in or around the infected area, particularly if the area of redness expands to a wider area  · Shaking chills  · Swelling of the infected area  · Vomiting   Date Last Reviewed: 8/1/2016  © 7731-1564 [employer]. [address] [place] [address], [place] [address]. All rights reserved. This information is not intended as a substitute for professional medical care. Always follow your healthcare professional's instructions.        Understanding Ingrown Toenails    An ingrown nail is the result of a nail growing into the skin that surrounds it. This often occurs at either edge of the big toe. Ingrown nails may be caused by improper trimming, inherited nail deformities, injuries, fungal infections, or pressure.  Symptoms  Ingrown nails may cause pain at the tip of the toe or all the way to the base of the toe. The pain is often worse while walking. An ingrown nail may also lead to infection, inflammation, or a more serious condition. If its infected, you might see pus or redness.  Evaluation  To determine the extent of your problem, your healthcare provider examines and possibly presses the painful area. If other problems are suspected, blood tests, cultures, and X-rays may be done as well.  Treatment  If the nail isnt infected, your healthcare provider may trim the corner of it to help relieve your symptoms. He or she may need to remove one side of your nail back to the cuticle. The base of the nail may then be treated with a chemical to keep the ingrown part from growing back. Severe infections or ingrown nails may require antibiotics and temporary or permanent removal of a portion of the nail. To prevent pain, a local anesthetic may be used in these procedures. This treatment is usually done at your  healthcare provider's office.  Prevention  Many nail problems can be prevented by wearing the right shoes and trimming your nails properly. To help avoid infection, keep your feet clean and dry. If you have diabetes, talk with your healthcare provider before doing any foot self-care.  · The right shoes: Get your feet measured (your size may change as you age). Wear shoes that are supportive and roomy enough for your toes to wiggle. Look for shoes made of natural materials such as leather, which allow your feet to breathe.  · Proper trimming: To avoid problems, trim your toenails straight across without cutting down into the corners. If you cant trim your own nails, ask your healthcare provider to do so for you.  Date Last Reviewed: 10/1/2016  © 8314-0116 [employer]. [address] [place] [address], [place] [address]. All rights reserved. This information is not intended as a substitute for professional medical care. Always follow your healthcare professional's instructions.

## 2020-02-03 ENCOUNTER — OFFICE VISIT (OUTPATIENT)
Dept: INTERNAL MEDICINE | Facility: CLINIC | Age: 40
End: 2020-02-03
Payer: COMMERCIAL

## 2020-02-03 VITALS
SYSTOLIC BLOOD PRESSURE: 120 MMHG | DIASTOLIC BLOOD PRESSURE: 80 MMHG | HEIGHT: 66 IN | BODY MASS INDEX: 28.28 KG/M2 | WEIGHT: 176 LBS

## 2020-02-03 DIAGNOSIS — R53.83 FATIGUE, UNSPECIFIED TYPE: ICD-10-CM

## 2020-02-03 DIAGNOSIS — J30.9 ALLERGIC RHINITIS, UNSPECIFIED SEASONALITY, UNSPECIFIED TRIGGER: ICD-10-CM

## 2020-02-03 DIAGNOSIS — Z00.00 HEALTH CARE MAINTENANCE: Primary | ICD-10-CM

## 2020-02-03 DIAGNOSIS — Z79.4 TYPE 2 DIABETES MELLITUS WITHOUT COMPLICATION, WITH LONG-TERM CURRENT USE OF INSULIN: ICD-10-CM

## 2020-02-03 DIAGNOSIS — F41.9 ANXIETY AND DEPRESSION: ICD-10-CM

## 2020-02-03 DIAGNOSIS — E11.9 TYPE 2 DIABETES MELLITUS WITHOUT COMPLICATION, WITHOUT LONG-TERM CURRENT USE OF INSULIN: ICD-10-CM

## 2020-02-03 DIAGNOSIS — E78.5 HYPERLIPIDEMIA, UNSPECIFIED HYPERLIPIDEMIA TYPE: ICD-10-CM

## 2020-02-03 DIAGNOSIS — R07.89 ATYPICAL CHEST PAIN: ICD-10-CM

## 2020-02-03 DIAGNOSIS — E11.9 TYPE 2 DIABETES MELLITUS WITHOUT COMPLICATION, WITH LONG-TERM CURRENT USE OF INSULIN: ICD-10-CM

## 2020-02-03 DIAGNOSIS — F32.A ANXIETY AND DEPRESSION: ICD-10-CM

## 2020-02-03 LAB
ALBUMIN/CREAT UR: 268.8 UG/MG (ref 0–30)
CREAT UR-MCNC: 48 MG/DL (ref 15–325)
MICROALBUMIN UR DL<=1MG/L-MCNC: 129 UG/ML

## 2020-02-03 PROCEDURE — 3008F BODY MASS INDEX DOCD: CPT | Mod: CPTII,S$GLB,, | Performed by: INTERNAL MEDICINE

## 2020-02-03 PROCEDURE — 99215 PR OFFICE/OUTPT VISIT, EST, LEVL V, 40-54 MIN: ICD-10-PCS | Mod: S$GLB,,, | Performed by: INTERNAL MEDICINE

## 2020-02-03 PROCEDURE — 99999 PR PBB SHADOW E&M-EST. PATIENT-LVL III: ICD-10-PCS | Mod: PBBFAC,,, | Performed by: INTERNAL MEDICINE

## 2020-02-03 PROCEDURE — 3008F PR BODY MASS INDEX (BMI) DOCUMENTED: ICD-10-PCS | Mod: CPTII,S$GLB,, | Performed by: INTERNAL MEDICINE

## 2020-02-03 PROCEDURE — 82043 UR ALBUMIN QUANTITATIVE: CPT

## 2020-02-03 PROCEDURE — 99999 PR PBB SHADOW E&M-EST. PATIENT-LVL III: CPT | Mod: PBBFAC,,, | Performed by: INTERNAL MEDICINE

## 2020-02-03 PROCEDURE — 99215 OFFICE O/P EST HI 40 MIN: CPT | Mod: S$GLB,,, | Performed by: INTERNAL MEDICINE

## 2020-02-03 RX ORDER — FLUTICASONE PROPIONATE 50 MCG
1 SPRAY, SUSPENSION (ML) NASAL DAILY
Qty: 16 G | Refills: 4 | Status: SHIPPED | OUTPATIENT
Start: 2020-02-03 | End: 2022-03-11

## 2020-02-03 RX ORDER — FENOFIBRATE 134 MG/1
CAPSULE ORAL
Qty: 90 CAPSULE | Refills: 3 | Status: SHIPPED | OUTPATIENT
Start: 2020-02-03 | End: 2021-04-01

## 2020-02-03 RX ORDER — LOSARTAN POTASSIUM 25 MG/1
25 TABLET ORAL DAILY
Qty: 90 TABLET | Refills: 3 | Status: SHIPPED | OUTPATIENT
Start: 2020-02-03 | End: 2021-05-25 | Stop reason: SDUPTHER

## 2020-02-03 RX ORDER — GLIMEPIRIDE 4 MG/1
4 TABLET ORAL
Qty: 90 TABLET | Refills: 3 | Status: SHIPPED | OUTPATIENT
Start: 2020-02-03 | End: 2020-03-31

## 2020-02-03 NOTE — PROGRESS NOTES
Subjective:       Patient ID: Codi Wilkerson is a 39 y.o. female.    Chief Complaint: Annual Exam (yearly check up.); Diabetes (has not been monitoring BG consistently. tries to avoid Metformin since it makes patient feel sick. stated the Glimepiride is not effective. (takes 2 a day)); and Follow-up (patient was seen in  on last week for toe infection, was prescribed Bactroban & Clindamycin. )    HPI   Parul Wilkerson is a 40 y/o female that has come to clinic as a new patient to establish care. She states that she needs help with managing her T2DM and high cholesterol levels. She infrequently has been checking her blood glucose levels and said her blood glucose was in the 200-300 range. Her symptoms have been feeling nausea, headaches, always thirsty, frequent urination and fatigue. She has been taking glimepiride 2 mg tablets with no success in decreasing her blood glucose levels.    The patient also mentioned that she periodically gets chest pain. The most recent episode was yesterday and these episodes have been occurring over the past year. Her chest pain occurs at rest. She feels a sharp pain that lasts for 2-3 minutes and is relieved with rest. She is unsure if the pain is brought on by exercise or strenuous activity. She has a history of anxiety, but is unsure if that is related to her chest pain.    Past Medical History:   Diagnosis Date    Bilateral ovarian cysts 2017    Depression     Diabetes mellitus     type 2    Elevated CEA 2017    Female pelvic pain 2017    GERD (gastroesophageal reflux disease)     HLD (hyperlipidemia)     Hypertension     Lesion of right lung 2017    Migraine headache     Right ovarian cyst 2017      Past Surgical History:   Procedure Laterality Date     SECTION, LOW TRANSVERSE  ,     COLONOSCOPY N/A 2017    Procedure: COLONOSCOPY;  Surgeon: Phill Valencia MD;  Location: [facility] ([address] [place] [address]);  Service: Endoscopy;  Laterality:  N/A;    ECTOPIC PREGNANCY SURGERY  2003    laparotomy     HYSTERECTOMY  2009    supracervical LH.     PARTIAL HYSTERECTOMY  3 years ago    SALPINGECTOMY Right 2003      Current Outpatient Medications on File Prior to Visit   Medication Sig Dispense Refill    clindamycin (CLEOCIN) 300 MG capsule Take 1 capsule (300 mg total) by mouth 3 (three) times daily. for 10 days 30 capsule 0    clonazePAM (KLONOPIN) 0.5 MG tablet Take 0.5 mg by mouth 2 (two) times daily as needed for Anxiety.      lurasidone (LATUDA) 40 mg Tab tablet Take 80 mg by mouth once daily.      mupirocin (BACTROBAN) 2 % ointment Apply topically 3 (three) times daily. 30 g 0    ondansetron (ZOFRAN-ODT) 4 MG TbDL Take 8 mg by mouth every 12 (twelve) hours.      [DISCONTINUED] fenofibrate micronized (LOFIBRA) 134 MG Cap TAKE ONE Capsule BY MOUTH ONCE DAILY **THANK YOU** 30 capsule 1    [DISCONTINUED] fluticasone (FLONASE) 50 mcg/actuation nasal spray 1 spray by Each Nare route once daily. 16 g 1    [DISCONTINUED] glimepiride (AMARYL) 2 MG tablet Take 2 mg by mouth before breakfast.      [DISCONTINUED] CHANTIX STARTING MONTH BOX 0.5 mg (11)- 1 mg (42) tablet USE AS DIRECTED ON PACKAGE **THANK YOU** (Patient not taking: Reported on 2/3/2020) 53 tablet 0    [DISCONTINUED] diazePAM (VALIUM) 5 MG tablet Take 2 tablets (10 mg total) by mouth every 6 (six) hours as needed for Anxiety. (Patient not taking: Reported on 2/3/2020) 15 tablet 0    [DISCONTINUED] diclofenac sodium (VOLTAREN) 1 % Gel Apply 2 g topically 4 (four) times daily. (Patient not taking: Reported on 2/3/2020) 1 Tube 0    [DISCONTINUED] ergocalciferol (VITAMIN D2) 50,000 unit Cap Take 50,000 Units by mouth every 7 days.      [DISCONTINUED] ergocalciferol (VITAMIN D2) 50,000 unit Cap Take 1 capsule (50,000 Units total) by mouth every 7 days. (Patient not taking: Reported on 2/3/2020) 12 capsule 0    [DISCONTINUED] metFORMIN (GLUCOPHAGE) 1000 MG tablet TAKE ONE Tablet BY MOUTH  TWICE DAILY FOR DIABETES **THANK YOU** (Patient not taking: Reported on 2/3/2020) 60 tablet 1    [DISCONTINUED] naproxen (NAPROSYN) 500 MG tablet Take 1 tablet (500 mg total) by mouth 2 (two) times daily with meals. (Patient not taking: Reported on 2/3/2020) 20 tablet 0    [DISCONTINUED] norethindrone (MICRONOR) 0.35 mg tablet Take 1 tablet (0.35 mg total) by mouth once daily. (Patient not taking: Reported on 2/3/2020) 30 tablet 11    [DISCONTINUED] pravastatin (PRAVACHOL) 20 MG tablet Take 1 tablet (20 mg total) by mouth once daily. (Patient not taking: Reported on 2/3/2020) 30 tablet 3    [DISCONTINUED] promethazine (PHENERGAN) 25 MG tablet Take 1 tablet (25 mg total) by mouth every 6 (six) hours as needed for Nausea. (Patient not taking: Reported on 2/3/2020) 20 tablet 0    [DISCONTINUED] sertraline (ZOLOFT) 100 MG tablet TAKE ONE Tablet BY MOUTH EVERY DAY THANK YOU  6    [DISCONTINUED] vilazodone (VIIBRYD) 20 mg Tab Take by mouth.       No current facility-administered medications on file prior to visit.      Social History:  · Drinks alcohol 2x a month  · Quit smoking 1 year ago  · Smoked one-and-half packs of cigarettes for 7 years.  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 She notes that the bread is something she identifies as not good for diabetes.  · Dinner her  cooks chicken, veggies, pasta  · She  snacks in between meals with chips  · She recently re-started drinking soft drinks (1x per day) around the holidays     Review of Systems   Constitutional: Positive for fatigue. Negative for chills, diaphoresis and fever.   HENT: Negative for congestion, ear discharge, ear pain, sore throat and trouble swallowing.    Eyes: Negative for photophobia, pain, redness and visual disturbance.   Respiratory: Negative for cough, chest tightness, shortness of breath, wheezing and stridor.    Cardiovascular: Negative for chest pain, palpitations and leg swelling.   Gastrointestinal: Negative for abdominal pain, constipation and diarrhea.   Endocrine: Positive for polydipsia and polyuria.   Genitourinary: Positive for frequency. Negative for difficulty urinating, dysuria and flank pain.   Musculoskeletal: Negative for myalgias.   Skin: Negative for color change.   Neurological: Positive for headaches (migraine occurs 1x month). Negative for speech difficulty and numbness.       Objective:      Physical Exam   Constitutional: She is oriented to person, place, and time. No distress.   HENT:   Head: Normocephalic and atraumatic.   Right Ear: External ear normal.   Left Ear: External ear normal.   Eyes: Conjunctivae and EOM are normal.   Cardiovascular: Normal rate, regular rhythm, normal heart sounds, intact distal pulses and normal pulses.   No murmur heard.  Pulmonary/Chest: Effort normal and breath sounds normal. No stridor. She has no wheezes. She exhibits no tenderness.   Abdominal: Soft. She exhibits no mass. There is no tenderness. There is no rebound and no guarding.   Musculoskeletal:        Right foot: There is no deformity.        Left foot: There is no deformity.   Feet:   Right Foot:   Protective Sensation: 10 sites tested. 10 sites sensed.   Skin Integrity: Positive for erythema (slight redness where the patient had ingrown toenail and cellulitis) and callus (bottom of right foot).   Left Foot:   Protective  Sensation: 10 sites tested. 10 sites sensed.   Neurological: She is alert and oriented to person, place, and time.   Skin: Skin is warm and dry. She is not diaphoretic.   Psychiatric: Her behavior is normal. Thought content normal.   Vitals reviewed.      Assessment:       1. Health care maintenance    2. Type 2 diabetes mellitus without complication, without long-term current use of insulin    3. Fatigue, unspecified type    4. Atypical chest pain    5. Hyperlipidemia, unspecified hyperlipidemia type    6. Allergic rhinitis, unspecified seasonality, unspecified trigger        Plan:       1. Health care maintenance  · HIV 1/2 Ag/AB (4th gen) ordered at this visit  · CBC ordered  · Vitamin B12 and vitamin D ordered  · TSH ordered  · Comprehensive metabolic panel ordered    2. Type 2 diabetes mellitus without complications  · Patient has had blood glucose checks in the range of 200-300  · Ordered a HbA1c.  · Increased the patients Glimiperide (sulfonylurea) to 4 mg daily at this visit. (already taking this dose most days)  · Discussed with patient the possibility of adding a second agent.  · Microalbumin/creatinine urine ratio ordered  · Added losartan instead of lisinopril to her med list for renal protection    3. Fatigue (unspecified)  · Potentially secondary to her diabetes not being well controlled  Will check vit d and b12 and cbc    4. Atypical chest pain  · Baseline EKG ordered with Cardiology ordered at this visit    5. Hyperlipidemia  · Patient currently taking fenofibrate  · Lipid panel ordered at this visit      Answers for HPI/ROS submitted by the patient on 1/27/2020   Diabetes problem  Diabetes type: type 2  MedicAlert ID: No  Disease duration: 5 years  blurred vision: No  chest pain: Yes  foot paresthesias: No  foot ulcerations: No  polyphagia: Yes  polyuria: Yes  weakness: Yes  weight loss: No  Symptom course: worsening  confusion: No  dizziness: No  headaches: Yes  hunger: No  mood changes:  Yes  nervous/anxious: Yes  pallor: Yes  seizures: No  sleepiness: No  speech difficulty: No  sweats: No  tremors: No  blackouts: No  hospitalization: No  required assistance: No  required glucagon: No  CVA: No  heart disease: No  nephropathy: No  peripheral neuropathy: No  retinopathy: No  autonomic neuropathy: No  CAD risks: dyslipidemia, family history, obesity, stress, diabetes mellitus  Current treatments: oral agent (monotherapy)  Treatment compliance: some of the time  Home blood tests: 3-4 x per week  Home urines: <1 x per month  Monitoring compliance: inadequate  Blood glucose trend: increasing steadily  Weight trend: fluctuating minimally  Current diet: generally unhealthy  Meal planning: avoidance of concentrated sweets  Exercise: never  Dietitian visit: No  Eye exam current: Yes  Sees podiatrist: No    ---------------------  38 yo F here to establish care for :    Diabetes  Glimepiride  Not taking metformin - made her feel very sick, nauseated, stomach cramps. Had same issues with XR.   BG have been in 200-300 range.   Always thirsty. Urinating a lot.   Previously on lisinopril for renal protection, no hx htn.  Wants to improve her diet. Weakness bread and pasta. Off soda for awhile but back. Sweet tea.     hld  Fenofibrate    Chest pain  About 2-3 times per week  Sharp, at rest or anytime randomly. 2-3 min.  None currently.    Concerned about thyroid. Hair loss and family hx.    Depression and anxiety  Clonazepam 0.5mg #30 most months per  Dr. Casanova.   latuda 40mg   Will continue to see Dr. Casanova.    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     Migraines  Strong family hx    Cellulitis right great toe  Improving post abx.    Foot   Eye - recently went Dr. Jonah Crump eye exam([facility])  Fasting labs  Urine microalbumin  p23  Will look for tdap records.    willing to do statin if high    Protective Sensation (w/ 10 gram monofilament):  Right: Intact  Left: Intact    Visual  Inspection:  Normal -  Left  Right great toe with mild erythema and no warmth, bruise on nail bed    Pedal Pulses:   Right: Present  Left: Present    Posterior tibialis:   Right:Present  Left: Present    I hereby acknowledge that I am relying upon documentation authored by a medical student working under my supervision and further I hereby attest that I have verified the student documentation or findings by personally re-performing the physical exam and medical decision making activities of the Evaluation and Management service to be billed.    45 minutes were spent with patient with over half of this time spent in coordination of care and/or counseling.         Marie Ivey

## 2020-02-03 NOTE — PROGRESS NOTES
40 yo F here to establish care for :    Diabetes  Glimepiride  Not taking metformin - made her feel very sick, nauseated, stomach cramps. Had same issues with XR.   BG have been in 200-300 range.   Always thirsty. Urinating a lot.   Previously on lisinopril for renal protection, no hx htn.  Wants to improve her diet. Weakness bread and pasta. Off soda for awhile but back. Sweet tea.     hld  Fenofibrate    Chest pain  About 2-3 times per week  Sharp, at rest or anytime randomly. 2-3 min.  None currently.    Concerned about thyroid. Hair loss and family hx.    Depression and anxiety  Clonazepam 0.5mg #30 most months per  Dr. Casanova.   latuda 40mg   Will continue to see Dr. Casanova.    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     Migraines  Strong family hx    Cellulitis right great toe  Improving post abx.    Foot   Eye - recently went Dr. Jonah Crump eye exam([facility])  Fasting labs  Urine microalbumin  p23  Will look for tdap records.    willing to do statin if high    Protective Sensation (w/ 10 gram monofilament):  Right: Intact  Left: Intact    Visual Inspection:  Normal -  Left  Right great toe with mild erythema and no warmth, bruise on nail bed    Pedal Pulses:   Right: Present  Left: Present    Posterior tibialis:   Right:Present  Left: Present    Answers for HPI/ROS submitted by the patient on 1/27/2020   Diabetes problem  Diabetes type: type 2  MedicAlert ID: No  Disease duration: 5 years  blurred vision: No  chest pain: Yes  foot paresthesias: No  foot ulcerations: No  polyphagia: Yes  polyuria: Yes  weakness: Yes  weight loss: No  Symptom course: worsening  confusion: No  dizziness: No  headaches: Yes  hunger: No  mood changes: Yes  nervous/anxious: Yes  pallor: Yes  seizures: No  sleepiness: No  speech difficulty: No  sweats: No  tremors: No  blackouts: No  hospitalization: No  required assistance: No  required glucagon: No  CVA: No  heart disease: No  nephropathy:  No  peripheral neuropathy: No  retinopathy: No  autonomic neuropathy: No  CAD risks: dyslipidemia, family history, obesity, stress, diabetes mellitus  Current treatments: oral agent (monotherapy)  Treatment compliance: some of the time  Home blood tests: 3-4 x per week  Home urines: <1 x per month  Monitoring compliance: inadequate  Blood glucose trend: increasing steadily  Weight trend: fluctuating minimally  Current diet: generally unhealthy  Meal planning: avoidance of concentrated sweets  Exercise: never  Dietitian visit: No  Eye exam current: Yes  Sees podiatrist: No

## 2020-02-04 ENCOUNTER — LAB VISIT (OUTPATIENT)
Dept: LAB | Facility: HOSPITAL | Age: 40
End: 2020-02-04
Attending: INTERNAL MEDICINE
Payer: COMMERCIAL

## 2020-02-04 DIAGNOSIS — E11.9 TYPE 2 DIABETES MELLITUS WITHOUT COMPLICATION, WITHOUT LONG-TERM CURRENT USE OF INSULIN: ICD-10-CM

## 2020-02-04 DIAGNOSIS — R53.83 FATIGUE, UNSPECIFIED TYPE: ICD-10-CM

## 2020-02-04 DIAGNOSIS — Z00.00 HEALTH CARE MAINTENANCE: ICD-10-CM

## 2020-02-04 LAB
25(OH)D3+25(OH)D2 SERPL-MCNC: 15 NG/ML (ref 30–96)
ALBUMIN SERPL BCP-MCNC: 3.7 G/DL (ref 3.5–5.2)
ALP SERPL-CCNC: 81 U/L (ref 55–135)
ALT SERPL W/O P-5'-P-CCNC: 92 U/L (ref 10–44)
ANION GAP SERPL CALC-SCNC: 9 MMOL/L (ref 8–16)
AST SERPL-CCNC: 61 U/L (ref 10–40)
BASOPHILS # BLD AUTO: 0.07 K/UL (ref 0–0.2)
BASOPHILS NFR BLD: 1.1 % (ref 0–1.9)
BILIRUB SERPL-MCNC: 0.5 MG/DL (ref 0.1–1)
BUN SERPL-MCNC: 14 MG/DL (ref 6–20)
CALCIUM SERPL-MCNC: 9.2 MG/DL (ref 8.7–10.5)
CHLORIDE SERPL-SCNC: 104 MMOL/L (ref 95–110)
CHOLEST SERPL-MCNC: 158 MG/DL (ref 120–199)
CHOLEST/HDLC SERPL: 8.3 {RATIO} (ref 2–5)
CO2 SERPL-SCNC: 22 MMOL/L (ref 23–29)
CREAT SERPL-MCNC: 0.7 MG/DL (ref 0.5–1.4)
DIFFERENTIAL METHOD: ABNORMAL
EOSINOPHIL # BLD AUTO: 0.1 K/UL (ref 0–0.5)
EOSINOPHIL NFR BLD: 1.2 % (ref 0–8)
ERYTHROCYTE [DISTWIDTH] IN BLOOD BY AUTOMATED COUNT: 13.1 % (ref 11.5–14.5)
EST. GFR  (AFRICAN AMERICAN): >60 ML/MIN/1.73 M^2
EST. GFR  (NON AFRICAN AMERICAN): >60 ML/MIN/1.73 M^2
ESTIMATED AVG GLUCOSE: 255 MG/DL (ref 68–131)
GLUCOSE SERPL-MCNC: 308 MG/DL (ref 70–110)
HBA1C MFR BLD HPLC: 10.5 % (ref 4–5.6)
HCT VFR BLD AUTO: 43.9 % (ref 37–48.5)
HDLC SERPL-MCNC: 19 MG/DL (ref 40–75)
HDLC SERPL: 12 % (ref 20–50)
HGB BLD-MCNC: 14.7 G/DL (ref 12–16)
HIV 1+2 AB+HIV1 P24 AG SERPL QL IA: NEGATIVE
IMM GRANULOCYTES # BLD AUTO: 0.04 K/UL (ref 0–0.04)
IMM GRANULOCYTES NFR BLD AUTO: 0.6 % (ref 0–0.5)
LDLC SERPL CALC-MCNC: ABNORMAL MG/DL (ref 63–159)
LYMPHOCYTES # BLD AUTO: 1.8 K/UL (ref 1–4.8)
LYMPHOCYTES NFR BLD: 27.6 % (ref 18–48)
MCH RBC QN AUTO: 30.2 PG (ref 27–31)
MCHC RBC AUTO-ENTMCNC: 33.5 G/DL (ref 32–36)
MCV RBC AUTO: 90 FL (ref 82–98)
MONOCYTES # BLD AUTO: 0.5 K/UL (ref 0.3–1)
MONOCYTES NFR BLD: 7.2 % (ref 4–15)
NEUTROPHILS # BLD AUTO: 4 K/UL (ref 1.8–7.7)
NEUTROPHILS NFR BLD: 62.3 % (ref 38–73)
NONHDLC SERPL-MCNC: 139 MG/DL
NRBC BLD-RTO: 0 /100 WBC
PLATELET # BLD AUTO: 241 K/UL (ref 150–350)
PMV BLD AUTO: 9.4 FL (ref 9.2–12.9)
POTASSIUM SERPL-SCNC: 4.1 MMOL/L (ref 3.5–5.1)
PROT SERPL-MCNC: 7.1 G/DL (ref 6–8.4)
RBC # BLD AUTO: 4.87 M/UL (ref 4–5.4)
SODIUM SERPL-SCNC: 135 MMOL/L (ref 136–145)
TRIGL SERPL-MCNC: 794 MG/DL (ref 30–150)
TSH SERPL DL<=0.005 MIU/L-ACNC: 1.81 UIU/ML (ref 0.4–4)
VIT B12 SERPL-MCNC: 531 PG/ML (ref 210–950)
WBC # BLD AUTO: 6.41 K/UL (ref 3.9–12.7)

## 2020-02-04 PROCEDURE — 85025 COMPLETE CBC W/AUTO DIFF WBC: CPT

## 2020-02-04 PROCEDURE — 36415 COLL VENOUS BLD VENIPUNCTURE: CPT

## 2020-02-04 PROCEDURE — 86703 HIV-1/HIV-2 1 RESULT ANTBDY: CPT

## 2020-02-04 PROCEDURE — 82607 VITAMIN B-12: CPT

## 2020-02-04 PROCEDURE — 82306 VITAMIN D 25 HYDROXY: CPT

## 2020-02-04 PROCEDURE — 80053 COMPREHEN METABOLIC PANEL: CPT

## 2020-02-04 PROCEDURE — 83036 HEMOGLOBIN GLYCOSYLATED A1C: CPT

## 2020-02-04 PROCEDURE — 84443 ASSAY THYROID STIM HORMONE: CPT

## 2020-02-04 PROCEDURE — 80061 LIPID PANEL: CPT

## 2020-02-06 ENCOUNTER — TELEPHONE (OUTPATIENT)
Dept: INTERNAL MEDICINE | Facility: CLINIC | Age: 40
End: 2020-02-06

## 2020-02-06 DIAGNOSIS — E78.5 HYPERLIPIDEMIA, UNSPECIFIED HYPERLIPIDEMIA TYPE: ICD-10-CM

## 2020-02-06 DIAGNOSIS — Z79.4 TYPE 2 DIABETES MELLITUS WITHOUT COMPLICATION, WITH LONG-TERM CURRENT USE OF INSULIN: Primary | ICD-10-CM

## 2020-02-06 DIAGNOSIS — E11.9 TYPE 2 DIABETES MELLITUS WITHOUT COMPLICATION, WITH LONG-TERM CURRENT USE OF INSULIN: Primary | ICD-10-CM

## 2020-02-06 NOTE — TELEPHONE ENCOUNTER
----- Message from Ita Valle sent at 2/6/2020 10:27 AM CST -----  Contact: [phone]  Please call about test results and need for insulin

## 2020-02-06 NOTE — TELEPHONE ENCOUNTER
Spoke with patient, offered to send message to covering provider. She would rather wait for Dr Ivey's advice, please advise.

## 2020-02-10 ENCOUNTER — TELEPHONE (OUTPATIENT)
Dept: INTERNAL MEDICINE | Facility: CLINIC | Age: 40
End: 2020-02-10

## 2020-02-10 NOTE — TELEPHONE ENCOUNTER
Let's add weekly trulicity injection. Keep diabetes education appointment and schedule with Andrew Krueger. Send me BG readings in 2 weeks (if she hasn't already seen MARVA Krueger) and repeat labs in 3 months (prior to next appointment.)

## 2020-02-10 NOTE — TELEPHONE ENCOUNTER
Spoke to patient.   Patient mentioned that you were going to place her on statins since lipid was elevated on lab work. Patient mentioned that her A1C levels were pretty high as well, ranging at 10.5. Patient stated you all discussed on her last office visit that you were going to start her on some type of insulin or diabetic medication?   Patient wanted me to inform you that she has been taking the 4000 mg of fish oil as well.     Please advise.

## 2020-02-10 NOTE — TELEPHONE ENCOUNTER
Spoke with pt, notified of message below and all recommendations. All appts scheduled  Pt will check BS 3 times a day

## 2020-02-11 ENCOUNTER — HOSPITAL ENCOUNTER (OUTPATIENT)
Dept: CARDIOLOGY | Facility: CLINIC | Age: 40
Discharge: HOME | End: 2020-02-11
Payer: COMMERCIAL

## 2020-02-11 DIAGNOSIS — E11.9 TYPE 2 DIABETES MELLITUS WITHOUT COMPLICATION, WITHOUT LONG-TERM CURRENT USE OF INSULIN: ICD-10-CM

## 2020-02-11 DIAGNOSIS — R07.89 ATYPICAL CHEST PAIN: ICD-10-CM

## 2020-02-11 DIAGNOSIS — Z00.00 HEALTH CARE MAINTENANCE: ICD-10-CM

## 2020-02-11 PROCEDURE — 93005 EKG 12-LEAD: ICD-10-PCS | Mod: S$GLB,,, | Performed by: INTERNAL MEDICINE

## 2020-02-11 PROCEDURE — 93010 ELECTROCARDIOGRAM REPORT: CPT | Mod: S$GLB,,, | Performed by: INTERNAL MEDICINE

## 2020-02-11 PROCEDURE — 93010 EKG 12-LEAD: ICD-10-PCS | Mod: S$GLB,,, | Performed by: INTERNAL MEDICINE

## 2020-02-11 PROCEDURE — 93005 ELECTROCARDIOGRAM TRACING: CPT | Mod: S$GLB,,, | Performed by: INTERNAL MEDICINE

## 2020-03-03 ENCOUNTER — CLINICAL SUPPORT (OUTPATIENT)
Dept: DIABETES | Facility: CLINIC | Age: 40
End: 2020-03-03
Payer: COMMERCIAL

## 2020-03-03 DIAGNOSIS — E11.9 TYPE 2 DIABETES MELLITUS WITHOUT COMPLICATION, WITHOUT LONG-TERM CURRENT USE OF INSULIN: ICD-10-CM

## 2020-03-03 PROCEDURE — 99999 PR PBB SHADOW E&M-EST. PATIENT-LVL III: ICD-10-PCS | Mod: PBBFAC,,,

## 2020-03-03 PROCEDURE — G0108 PR DIAB MANAGE TRN  PER INDIV: ICD-10-PCS | Mod: S$GLB,,, | Performed by: INTERNAL MEDICINE

## 2020-03-03 PROCEDURE — G0108 DIAB MANAGE TRN  PER INDIV: HCPCS | Mod: S$GLB,,, | Performed by: INTERNAL MEDICINE

## 2020-03-03 PROCEDURE — 99999 PR PBB SHADOW E&M-EST. PATIENT-LVL III: CPT | Mod: PBBFAC,,,

## 2020-03-03 NOTE — PROGRESS NOTES
Diabetes Education  Author: Deepika Turk RN  Date: 3/3/2020    Diabetes Care Management Summary  Diabetes Education Record Assessment/Progress: Initial  Current Diabetes Risk Level: High     Last A1c:   Lab Results   Component Value Date    HGBA1C 10.5 (H) 02/04/2020     Last Visit with Diabetes Educator: : 03/03/2020  Last OPCM Referral: : Not Found   Enrolled in OPCM: No      Diabetes Type  Diabetes Type : Type II    Diabetes History  Diabetes Diagnosis: (P) 5-10 years  Current Treatment: Oral Medication, Injectable  Reviewed Problem List with Patient: Yes    Health Maintenance was reviewed today with patient. Discussed with patient importance of routine eye exams, foot exams/foot care, blood work (i.e.: A1c, microalbumin, and lipid), dental visits, yearly flu vaccine, and pneumonia vaccine as indicated by PCP. Patient verbalized understanding.     Health Maintenance Topics with due status: Not Due       Topic Last Completion Date    Foot Exam 02/03/2020    Lipid Panel 02/04/2020    Hemoglobin A1c 02/04/2020     Health Maintenance Due   Topic Date Due    Eye Exam  12/11/1991    TETANUS VACCINE  12/11/1999    Pneumococcal Vaccine (Medium Risk) (1 of 1 - PPSV23) 12/11/2000       Nutrition  Meal Planning: (P) diet drinks, water, snacks between meal, 3 meals per day, eats out often, drinks regular soda, artificial sweeteners  What type of sweetener do you use?: (P) Equal  What type of beverages do you drink?: (P) milk, diet soda/tea, water, regular soda/tea  Meal Plan 24 Hour Recall - Breakfast: (P) 2 nutri grain bars and milk, nutrition tea  Meal Plan 24 Hour Recall - Lunch: (P) carrots, water  Meal Plan 24 Hour Recall - Dinner: (P) raost, wheat bread, okra, water  Meal Plan 24 Hour Recall - Snack: (P) popcorn, chips, grapes, fruit    Monitoring   Monitoring: (P) Other  Self Monitoring : (P) SMBG: TID= AM:191-250, lunch: 170-280, dinner: 116-275  Blood Glucose Logs: (P) Yes  Do you use a personal continuous  glucose monitor?: (P) No  In the last month, how often have you had a low blood sugar reaction?: (P) never  Can you tell when your blood sugar is too high?: (P) no    Exercise   Exercise Type: (P) none    Current Diabetes Treatment   Current Treatment: Oral Medication, Injectable    Social History  Preferred Learning Method: Face to Face, Demonstration, Reading Materials  Primary Support: Family, Friends  Smoking Status: Ex Smoker      Barriers to Change  Barriers to Change: None  Learning Challenges : None    Readiness to Learn   Readiness to Learn : Acceptance    Cultural Influences  Cultural Influences: None    Diabetes Education Assessment/Progress        Diabetes Disease Process (diabetes disease process and treatment options): Discussion, Instructed, Individual Session, Written Materials Provided, Comprehends Key Points  Discussed qualifying parameters of diabetes dx. Reviewed/Instructed on disease process and pt's most likely causes of recently 10.5 A1c. Discussed current treatment options, especially dietary and lifestyle changes as well as medication additions and/or changes. Patient verbalizes understanding of received information/education.       Nutrition (Incorporating nutritional management into one's lifestyle): Discussion, Instructed, Individual Session, Written Materials Provided, Comprehends Key Points  Emphasized importance of eliminating all SSB. Discussed SF drink options. Discussed carb vs non-carb foods and reviewed appropriate amounts of carbs to have at meals vs snacks. Recommended 30-45 gm carb at meals and 15 gm carb at snacks. Instructed on appropriate label reading and serving sizes of specific carb containing foods. Reviewed need to limit total/saturated fats. Discussed meal plans and snack ideas amenable to pt. Reviewed the plate method. Patient verbalizes understanding of received information/education.         Physical Activity (incorporating physical activity into one's lifestyle):  Discussion, Instructed, Individual Session, Written Materials Provided, Comprehends Key Points  Discussed goals and benefits of regular Physical Activity. Reviewed difference between active lifestyle and structured physical activity. Encouraged Physical Activity with increased heart rate for sustained duration as tolerated. Reviewed Physical Activity goals of >150 minutes weekly. Patient verbalizes understanding of received information/education.       Medications (states correct name, dose, onset, peak, duration, side effects & timing of meds): Discussion, Instructed, Individual Session, Written Materials Provided, Comprehends Key Points  Discussed mechanism of action of oral diabetic medication amaryl. Reviewed signs and symptoms of hypoglycemia and treatment with Rule of 15. Discussed general vs severe hyperglycemia and risk of DKA. Patient verbalizes understanding of received information/education.   Mechanism of action of GLP-1 injection explained. Discussed possible side effects. Reviewed need for rotation of injection sites, appropriate storage, expiration date, safe disposal of used sharps and pen preparation and use. Patient verbalizes understanding of received information/education.       Monitoring (monitoring blood glucose/other parameters & using results): Discussion, Instructed, Individual Session, Written Materials Provided, Comprehends Key Points  Discussed goal BGs for different times of day and in relation to meals. Instructed pt to test BG once every other day: fasting and 2-hours after any meal. Reviewed need for updated BG logs for all endo, PCP, and education appts. Patient verbalizes understanding of received information/education.         Acute Complications (preventing, detecting, and treating acute complications): Discussion, Instructed, Individual Session, Written Materials Provided, Comprehends Key Points  Discussed hypoglycemia vs hyperglycemia symptoms and discussed appropriate  treatments for each. Reviewed that pt is at low risk of hypo with current medication regimen. Discussed general vs severe hyperglycemia and risk of DKA.    Chronic Complications (preventing, detecting, and treating chronic complications): Discussion, Instructed, Individual Session, Written Materials Provided, Comprehends Key Points(refer podiatry and optometry)  Reviewed annual diabetes care schedule and patient priorities. Patient verbalizes understanding of received information/education.       Clinical (diabetes, other pertinent medical history, and relevant comorbidities reviewed during visit): Discussion, Instructed, Individual Session  Reviewed current medical history including co-morbidities.    Cognitive (knowledge of self-management skills, functional health literacy): Discussion, Instructed, Individual Session  Arrives with adequate health management knowledge - poor specific knowledge of diabetes management. Leaves with increased knowledge base - will benefit from f/u in  3 months.      Psychosocial (emotional response to diabetes): Not Covered/Deferred(Unable to complete due to Time Constraints)  Diabetes Distress and Support Systems: Not Covered/Deferred(Unable to complete due to Time Constraints)        Behavioral (readiness for change, lifestyle practices, self-care behaviors): Discussion, Instructed, Individual Session  Appears  motivated to make recommended changes.        Goals  Patient has selected/evaluated goals during today's session: (P) Yes, selected  Healthy Eating: (P) Set(Will measure food and read food labels. Will count carbohydrates to equal 30-45 gm per meal. Will abstain from drinking Sugar Beverages and utilize Diet and/or artificial sweetener.)  Start Date: (P) 03/03/20  Target Date: (P) 06/03/20         Diabetes Care Plan/Intervention  Education Plan/Intervention: (P) Individual Follow-Up DSMT    Diabetes Meal Plan  Restrictions: (P) Restricted Carbohydrate  Carbohydrate Per Meal:  (P) 30-45g  Carbohydrate Per Snack : (P) 7-15g    Today's Self-Management Care Plan was developed with the patient's input and is based on barriers identified during today's assessment.    The long and short-term goals in the care plan were written with the patient/caregiver's input. The patient has agreed to work toward these goals to improve her overall diabetes control.      The patient received a copy of today's self-management plan and verbalized understanding of the care plan, goals, and all of today's instructions.      The patient was encouraged to communicate with her physician and care team regarding her condition(s) and treatment.  I provided the patient with my contact information today and encouraged her to contact me via phone or patient portal as needed.     Education Units of Time   Time Spent: (P) 60 min

## 2020-03-31 ENCOUNTER — PATIENT MESSAGE (OUTPATIENT)
Dept: INTERNAL MEDICINE | Facility: CLINIC | Age: 40
End: 2020-03-31

## 2020-03-31 ENCOUNTER — OFFICE VISIT (OUTPATIENT)
Dept: INTERNAL MEDICINE | Facility: CLINIC | Age: 40
End: 2020-03-31
Payer: COMMERCIAL

## 2020-03-31 DIAGNOSIS — R91.1 LESION OF RIGHT LUNG: ICD-10-CM

## 2020-03-31 DIAGNOSIS — E78.5 HYPERLIPIDEMIA, UNSPECIFIED HYPERLIPIDEMIA TYPE: ICD-10-CM

## 2020-03-31 DIAGNOSIS — E11.9 TYPE 2 DIABETES MELLITUS WITHOUT COMPLICATION, WITHOUT LONG-TERM CURRENT USE OF INSULIN: Primary | ICD-10-CM

## 2020-03-31 DIAGNOSIS — Z71.89 COUNSELING AND COORDINATION OF CARE: ICD-10-CM

## 2020-03-31 PROCEDURE — 99214 PR OFFICE/OUTPT VISIT, EST, LEVL IV, 30-39 MIN: ICD-10-PCS | Mod: 95,,, | Performed by: NURSE PRACTITIONER

## 2020-03-31 PROCEDURE — 3046F PR MOST RECENT HEMOGLOBIN A1C LEVEL > 9.0%: ICD-10-PCS | Mod: CPTII,S$GLB,, | Performed by: NURSE PRACTITIONER

## 2020-03-31 PROCEDURE — 99214 OFFICE O/P EST MOD 30 MIN: CPT | Mod: 95,,, | Performed by: NURSE PRACTITIONER

## 2020-03-31 PROCEDURE — 3046F HEMOGLOBIN A1C LEVEL >9.0%: CPT | Mod: CPTII,S$GLB,, | Performed by: NURSE PRACTITIONER

## 2020-03-31 NOTE — PROGRESS NOTES
CHIEF COMPLAINT: Type 2 Diabetes     HPI: Mrs. Codi Wilkerson is a 39 y.o. female who was diagnosed with Type 2 DM x 6 .   Has h/o anxiety, depression, HLD, bilateral ovarian cysts.  DE this week w/ Deepika DECKER.   Am: 200s-240  Pm 105-140s   2-3 meals a day, not snacking often, 1 snack in afternoon.     The patient location is: home  The chief complaint leading to consultation is: type 2 dm, uncontrolled   Visit type: Virtual visit with synchronous audio and video  Total time spent with patient: 20 mins   Each patient to whom he or she provides medical services by telemedicine is:  (1) informed of the relationship between the physician and patient and the respective role of any other health care provider with respect to management of the patient; and (2) notified that he or she may decline to receive medical services by telemedicine and may withdraw from such care at any time.        Lab Results   Component Value Date    HGBA1C 10.5 (H) 02/04/2020     PREVIOUS DIABETES MEDICATIONS TRIED  Metformin --deathly ill   trulicity -nausea-started x 2 mos ago     CURRENT DIABETIC MEDS: glimepiride 4 mg w/ breakfast, trulicity 1.5 mg weekly     Diabetes Management Status    Statin: Not taking  ACE/ARB: Taking    Screening or Prevention Patient's value Goal Complete/Controlled?   HgA1C Testing and Control   Lab Results   Component Value Date    HGBA1C 10.5 (H) 02/04/2020      Annually/Less than 8% No   Lipid profile : 02/04/2020 Annually Yes   LDL control Lab Results   Component Value Date    LDLCALC Invalid, Trig>400.0 02/04/2020    Annually/Less than 100 mg/dl  Yes   Nephropathy screening Lab Results   Component Value Date    LABMICR 129.0 02/03/2020     Lab Results   Component Value Date    PROTEINUA 30 (A) 02/12/2013    Annually Yes   Blood pressure BP Readings from Last 1 Encounters:   02/03/20 120/80    Less than 140/90 Yes   Dilated retinal exam Most Recent Eye Exam Date: Not Found Annually No   Foot exam   : 02/03/2020  Annually Yes     REVIEW OF SYSTEMS  General: no weakness, fatigue, or weight changes.   Eyes: no double or blurred vision, eye pain, or redness  Cardiovascular: no chest pain, palpitations, edema, or murmurs.   Respiratory: no cough or dyspnea.   GI: no heartburn, nausea, or changes in bowel patterns; good appetite.   Skin: no rashes, dryness, itching, or reactions at insulin injection sites.  Neuro: no numbness, tingling, tremors, or vertigo.   Endocrine: no polyuria, polydipsia, polyphagia, heat or cold intolerance.     Vital Signs  LMP 02/12/2009     Hemoglobin A1C   Date Value Ref Range Status   02/04/2020 10.5 (H) 4.0 - 5.6 % Final     Comment:     ADA Screening Guidelines:  5.7-6.4%  Consistent with prediabetes  >or=6.5%  Consistent with diabetes  High levels of fetal hemoglobin interfere with the HbA1C  assay. Heterozygous hemoglobin variants (HbS, HgC, etc)do  not significantly interfere with this assay.   However, presence of multiple variants may affect accuracy.     04/14/2017 8.4 (H) 4.8 - 5.6 % Final     Comment:              Pre-diabetes: 5.7 - 6.4           Diabetes: >6.4           Glycemic control for adults with diabetes: <7.0     01/10/2017 7.6 (H) 4.8 - 5.6 % Final     Comment:              Pre-diabetes: 5.7 - 6.4           Diabetes: >6.4           Glycemic control for adults with diabetes: <7.0          Chemistry        Component Value Date/Time     (L) 02/04/2020 0713    K 4.1 02/04/2020 0713     02/04/2020 0713    CO2 22 (L) 02/04/2020 0713    BUN 14 02/04/2020 0713    CREATININE 0.7 02/04/2020 0713     (H) 02/04/2020 0713        Component Value Date/Time    CALCIUM 9.2 02/04/2020 0713    ALKPHOS 81 02/04/2020 0713    AST 61 (H) 02/04/2020 0713    ALT 92 (H) 02/04/2020 0713    BILITOT 0.5 02/04/2020 0713    ESTGFRAFRICA >60.0 02/04/2020 0713    EGFRNONAA >60.0 02/04/2020 0713           Lab Results   Component Value Date    TSH 1.811 02/04/2020      Lab Results   Component  Value Date    CHOL 158 02/04/2020    CHOL 165 01/10/2017     Lab Results   Component Value Date    HDL 19 (L) 02/04/2020    HDL 12 (L) 01/10/2017     Lab Results   Component Value Date    LDLCALC Invalid, Trig>400.0 02/04/2020    LDLCALC Comment 01/10/2017     Lab Results   Component Value Date    TRIG 794 (H) 02/04/2020    TRIG 814 (HH) 01/10/2017     Lab Results   Component Value Date    CHOLHDL 12.0 (L) 02/04/2020         PHYSICAL EXAMINATION  Constitutional: Appears well, no distress.  Eyes: conjunctivae & lids intact; PERRLA, EOMs intact; optic discs   Neck: Supple, trachea midline.   Respiratory: No wheezes, even and unlabored  Cardiovascular: deferred   Lymph: deferred   Skin: warm and dry; no injection site reactions, no acanthosis nigracans observed.  Neuro:patient alert and cooperative, normal affect; steady gait.  Psychiatric: judgement & insight intact, orientation of time, place & person intact, memory; mood & affect wnl     Diabetes Foot Exam:   Deferred.   Assessment/Plan  1. Type 2 diabetes mellitus without complication, without long-term current use of insulin  Hemoglobin A1c   2. Counseling and coordination of care     3. Hyperlipidemia, unspecified hyperlipidemia type     4. Lesion of right lung         1. F/u in 3-4 mos w/ me  A1c next time  Orders in [facility] 5/1/20   Add jardiance 10 mg daily  Stop glimepiride  Discussed MOA, SEs   Continue trulicity 1.5 mg weekly   a1c goal less than 7%  Message of resources sent via [facility]  2. See above   3. On fenofibrate  Not on statin- will re eval next time   Lab Results   Component Value Date    LDLCALC Invalid, Trig>400.0 02/04/2020     Above goal   4. F/u with pcp     FOLLOW UP  Follow up in about 3 months (around 6/30/2020).     Time: 30 mins

## 2020-03-31 NOTE — PATIENT INSTRUCTIONS
Empagliflozin oral tablets  What is this medicine?  EMPAGLIGLOZIN (EM pa gli FLOE zin) helps to treat type 2 diabetes. It helps to control blood sugar. Treatment is combined with diet and exercise.  How should I use this medicine?  Take this medicine by mouth with a glass of water. Follow the directions on the prescription label. Take it in the morning, with or without food. Take your dose at the same time each day. Do not take more often than directed. Do not stop taking except on your doctor's advice.  Talk to your pediatrician regarding the use of this medicine in children. Special care may be needed.  What side effects may I notice from receiving this medicine?  Side effects that you should report to your doctor or health care professional as soon as possible:  · allergic reactions like skin rash, itching or hives, swelling of the face, lips, or tongue  · breathing problems  · dizziness  · fast or irregular heartbeat  · feeling faint or lightheaded, falls  · muscle weakness  · nausea, vomiting, unusual stomach upset or pain  · signs and symptoms of low blood sugar such as feeling anxious, confusion, dizziness, increased hunger, unusually weak or tired, sweating, shakiness, cold, irritable, headache, blurred vision, fast heartbeat, loss of consciousness  · signs and symptoms of a urinary tract infection, such as fever, chills, a burning feeling when urinating, blood in the urine, back pain  · trouble passing urine or change in the amount of urine, including an urgent need to urinate more often, in larger amounts, or at night  · penile discharge, itching, or pain in men  · unusual tiredness  · vaginal discharge, itching, or odor in women  Side effects that usually do not require medical attention (Report these to your doctor or health care professional if they continue or are bothersome.):  · joint pain  · mild increase in urination  · thirsty  What may interact with this medicine?  Do not take this medicine  with any of the following medications:  · gatifloxacin  This medicine may also interact with the following medications:  · alcohol  · certain medicines for blood pressure, heart disease  · diuretics  What if I miss a dose?  If you miss a dose, take it as soon as you can. If it is almost time for your next dose, take only that dose. Do not take double or extra doses.  Where should I keep my medicine?  Keep out of the reach of children.  Store at room temperature between 20 and 25 degrees C (68 and 77 degrees F). Throw away any unused medicine after the expiration date.  What should I tell my health care provider before I take this medicine?  They need to know if you have any of these conditions:  · dehydration  · diabetic ketoacidosis  · diet low in salt  · eating less due to illness, surgery, dieting, or any other reason  · having surgery  · high cholesterol  · high levels of potassium in the blood  · history of pancreatitis or pancreas problems  · history of yeast infection of the penis or vagina  · if you often drink alcohol  · infections in the bladder, kidneys, or urinary tract  · kidney disease  · liver disease  · low blood pressure  · on hemodialysis  · problems urinating  · type 1 diabetes  · uncircumcised male  · an unusual or allergic reaction to empagliflozin, other medicines, foods, dyes, or preservatives  · pregnant or trying to get pregnant  · breast-feeding  What should I watch for while using this medicine?  Visit your doctor or health care professional for regular checks on your progress.  This medicine can cause a serious condition in which there is too much acid in the blood. If you develop nausea, vomiting, stomach pain, unusual tiredness, or breathing problems, stop taking this medicine and call your doctor right away. If possible, use a ketone dipstick to check for ketones in your urine.  A test called the HbA1C (A1C) will be monitored. This is a simple blood test. It measures your blood sugar  control over the last 2 to 3 months. You will receive this test every 3 to 6 months.  Learn how to check your blood sugar. Learn the symptoms of low and high blood sugar and how to manage them.  Always carry a quick-source of sugar with you in case you have symptoms of low blood sugar. Examples include hard sugar candy or glucose tablets. Make sure others know that you can choke if you eat or drink when you develop serious symptoms of low blood sugar, such as seizures or unconsciousness. They must get medical help at once.  Tell your doctor or health care professional if you have high blood sugar. You might need to change the dose of your medicine. If you are sick or exercising more than usual, you might need to change the dose of your medicine.  Do not skip meals. Ask your doctor or health care professional if you should avoid alcohol. Many nonprescription cough and cold products contain sugar or alcohol. These can affect blood sugar.  Wear a medical ID bracelet or chain, and carry a card that describes your disease and details of your medicine and dosage times.  NOTE:This sheet is a summary. It may not cover all possible information. If you have questions about this medicine, talk to your doctor, pharmacist, or health care provider. Copyright© 2017 [employer]        Hypoglycemia (Low Blood Sugar)     Fast-acting sugar includes a cup of nonfat milk.     Too little sugar (glucose) in your blood is called hypoglycemia or low blood sugar. Low blood sugar usually means anything lower than 70 mg/dL. Talk with your healthcare provider about your target range and what level is too low for you. Diabetes itself doesnt cause low blood sugar. But some of the treatments for diabetes, such as pills or insulin, may raise your risk for it. Low blood sugar may cause you to pass out or have a seizure. So always treat low blood sugar right away, but don't overeat.  Special note: Always carry a source of fast-acting sugar and a  snack in case of hypoglycemia.   What you may notice  If you have low blood sugar, you may have one or more of these symptoms:  · Shakiness or dizziness  · Cold, clammy skin or sweating  · Feelings of hunger  · Headache  · Nervousness  · A hard, fast heartbeat  · Weakness  · Confusion or irritability  · Blurred vision  · Having nightmares or waking up confused or sweating  · Numbness or tingling in the lips or tongue  What you should do  Here are tips to follow if you have hypoglycemia:   · First check your blood sugar. If it is too low (out of your target range), eat or drink 15 to 20 grams of fast-acting sugar. This may be 3 to 4 glucose tablets, 4 ounces (half a cup) of fruit juice or regular (nondiet) soda, 8 ounces (1 cup) of fat-free milk, or 1 tablespoon of honey. Dont take more than this, or your blood sugar may go too high.  · Wait 15 minutes. Then recheck your blood sugar if you can.  · If your blood sugar is still too low, repeat the steps above and check your blood sugar again. If your blood sugar still has not returned to your target range, contact your healthcare provider or seek emergency care.  · Once your blood sugar returns to target range, eat a snack or meal.  Preventing low blood sugar  Things you can do include the following:   · If your condition needs a strict treatment plan, eat your meals and snacks at the same times each day. Dont skip meals!  · If your treatment plan lets you change when you eat and what you eat, learn how to change the time and dose of your rapid-acting insulin to match this.   · Ask your healthcare provider if it is safe for you to drink alcohol. Never drink on an empty stomach.  · Take your medicine at the prescribed times.  · Always carry a source of fast-acting sugar and a snack when youre away from home.  Other things to do  Additional tips include the following:  · Carry a medical ID card, a compact USB drive, or wear a medical alert bracelet or necklace. It  should say that you have diabetes. It should also say what to do if you pass out or have a seizure.  · Make sure your family, friends, and coworkers know the signs of low blood sugar. Tell them what to do if your blood sugar falls very low and you cant treat yourself.  · Keep a glucagon emergency kit handy. Be sure your family, friends, and coworkers know how and when to use it. Check it regularly and replace the glucagon before it expires.  · Talk with your health care team about other things you can do to prevent low blood sugar.     If you have unexplained hypoglycemia or hypoglycemia several times, call your healthcare provider.   Date Last Reviewed: 5/1/2016  © 0273-5749 [employer]. [address] [place] [address], [place] [address]. All rights reserved. This information is not intended as a substitute for professional medical care. Always follow your healthcare professional's instructions.

## 2020-05-01 ENCOUNTER — LAB VISIT (OUTPATIENT)
Dept: LAB | Facility: HOSPITAL | Age: 40
End: 2020-05-01
Attending: INTERNAL MEDICINE
Payer: COMMERCIAL

## 2020-05-01 DIAGNOSIS — E78.5 HYPERLIPIDEMIA, UNSPECIFIED HYPERLIPIDEMIA TYPE: ICD-10-CM

## 2020-05-01 DIAGNOSIS — E11.9 TYPE 2 DIABETES MELLITUS WITHOUT COMPLICATION, WITH LONG-TERM CURRENT USE OF INSULIN: ICD-10-CM

## 2020-05-01 DIAGNOSIS — Z79.4 TYPE 2 DIABETES MELLITUS WITHOUT COMPLICATION, WITH LONG-TERM CURRENT USE OF INSULIN: ICD-10-CM

## 2020-05-01 LAB
ALBUMIN SERPL BCP-MCNC: 4.2 G/DL (ref 3.5–5.2)
ALP SERPL-CCNC: 61 U/L (ref 55–135)
ALT SERPL W/O P-5'-P-CCNC: 53 U/L (ref 10–44)
ANION GAP SERPL CALC-SCNC: 8 MMOL/L (ref 8–16)
AST SERPL-CCNC: 33 U/L (ref 10–40)
BILIRUB SERPL-MCNC: 0.4 MG/DL (ref 0.1–1)
BUN SERPL-MCNC: 12 MG/DL (ref 6–20)
CALCIUM SERPL-MCNC: 9.4 MG/DL (ref 8.7–10.5)
CHLORIDE SERPL-SCNC: 104 MMOL/L (ref 95–110)
CHOLEST SERPL-MCNC: 134 MG/DL (ref 120–199)
CHOLEST/HDLC SERPL: 6.1 {RATIO} (ref 2–5)
CO2 SERPL-SCNC: 26 MMOL/L (ref 23–29)
CREAT SERPL-MCNC: 0.8 MG/DL (ref 0.5–1.4)
EST. GFR  (AFRICAN AMERICAN): >60 ML/MIN/1.73 M^2
EST. GFR  (NON AFRICAN AMERICAN): >60 ML/MIN/1.73 M^2
ESTIMATED AVG GLUCOSE: 166 MG/DL (ref 68–131)
GLUCOSE SERPL-MCNC: 190 MG/DL (ref 70–110)
HBA1C MFR BLD HPLC: 7.4 % (ref 4–5.6)
HDLC SERPL-MCNC: 22 MG/DL (ref 40–75)
HDLC SERPL: 16.4 % (ref 20–50)
LDLC SERPL CALC-MCNC: 57.8 MG/DL (ref 63–159)
NONHDLC SERPL-MCNC: 112 MG/DL
POTASSIUM SERPL-SCNC: 4.1 MMOL/L (ref 3.5–5.1)
PROT SERPL-MCNC: 7.5 G/DL (ref 6–8.4)
SODIUM SERPL-SCNC: 138 MMOL/L (ref 136–145)
TRIGL SERPL-MCNC: 271 MG/DL (ref 30–150)

## 2020-05-01 PROCEDURE — 80061 LIPID PANEL: CPT

## 2020-05-01 PROCEDURE — 80053 COMPREHEN METABOLIC PANEL: CPT

## 2020-05-01 PROCEDURE — 83036 HEMOGLOBIN GLYCOSYLATED A1C: CPT

## 2020-05-01 PROCEDURE — 36415 COLL VENOUS BLD VENIPUNCTURE: CPT

## 2020-05-05 ENCOUNTER — TELEPHONE (OUTPATIENT)
Dept: INTERNAL MEDICINE | Facility: CLINIC | Age: 40
End: 2020-05-05

## 2020-05-05 ENCOUNTER — OFFICE VISIT (OUTPATIENT)
Dept: INTERNAL MEDICINE | Facility: CLINIC | Age: 40
End: 2020-05-05
Payer: COMMERCIAL

## 2020-05-05 DIAGNOSIS — E78.2 MIXED HYPERLIPIDEMIA: ICD-10-CM

## 2020-05-05 DIAGNOSIS — E11.9 TYPE 2 DIABETES MELLITUS WITHOUT COMPLICATION, WITHOUT LONG-TERM CURRENT USE OF INSULIN: Primary | ICD-10-CM

## 2020-05-05 DIAGNOSIS — Z23 NEED FOR STREPTOCOCCUS PNEUMONIAE VACCINATION: ICD-10-CM

## 2020-05-05 PROCEDURE — 99213 PR OFFICE/OUTPT VISIT, EST, LEVL III, 20-29 MIN: ICD-10-PCS | Mod: 95,,, | Performed by: INTERNAL MEDICINE

## 2020-05-05 PROCEDURE — 3051F PR MOST RECENT HEMOGLOBIN A1C LEVEL 7.0 - < 8.0%: ICD-10-PCS | Mod: CPTII,,, | Performed by: INTERNAL MEDICINE

## 2020-05-05 PROCEDURE — 99213 OFFICE O/P EST LOW 20 MIN: CPT | Mod: 95,,, | Performed by: INTERNAL MEDICINE

## 2020-05-05 PROCEDURE — 3051F HG A1C>EQUAL 7.0%<8.0%: CPT | Mod: CPTII,,, | Performed by: INTERNAL MEDICINE

## 2020-08-10 NOTE — TELEPHONE ENCOUNTER
----- Message from Adi Reyes MD sent at 6/8/2017  7:18 AM CDT -----  Patient informed of CT scan results. Will need repeat ct scan of chest in 4 months. She knows to call to schedule in 4 months.   She is on OCPs. Has a 2.7 cm simple right ovarian cyst. I told her I do not think this is the source of her elevated CEA.   Has GI and hepatology appts.   negative...

## 2020-08-27 ENCOUNTER — LAB VISIT (OUTPATIENT)
Dept: LAB | Facility: HOSPITAL | Age: 40
End: 2020-08-27
Attending: NURSE PRACTITIONER
Payer: COMMERCIAL

## 2020-08-27 DIAGNOSIS — E11.9 TYPE 2 DIABETES MELLITUS WITHOUT COMPLICATION, UNSPECIFIED WHETHER LONG TERM INSULIN USE: ICD-10-CM

## 2020-08-27 DIAGNOSIS — E11.9 TYPE 2 DIABETES MELLITUS WITHOUT COMPLICATION, WITHOUT LONG-TERM CURRENT USE OF INSULIN: ICD-10-CM

## 2020-08-27 LAB
ESTIMATED AVG GLUCOSE: 157 MG/DL (ref 68–131)
HBA1C MFR BLD HPLC: 7.1 % (ref 4–5.6)

## 2020-08-27 PROCEDURE — 36415 COLL VENOUS BLD VENIPUNCTURE: CPT | Mod: PO

## 2020-08-27 PROCEDURE — 83036 HEMOGLOBIN GLYCOSYLATED A1C: CPT

## 2020-09-01 NOTE — PROGRESS NOTES
CHIEF COMPLAINT: Type 2 Diabetes     HPI: Mrs. Codi Wilkerson is a 39 y.o. female who was diagnosed with Type 2 DM x 6 years old.   Last visit done via virtual 3/2020.   Past Medical History:   Diagnosis Date    Anxiety and depression     psychiatrist Dr. Casanova    Bilateral ovarian cysts 5/30/2017    Diabetes mellitus 2014    type 2    Elevated CEA 5/12/2017    Female pelvic pain 5/30/2017    GERD (gastroesophageal reflux disease)     HLD (hyperlipidemia)     Lesion of right lung 7/5/2017    Migraine headache     Right ovarian cyst 5/12/2017     First in person visit  Has c/o nausea with latuda.  Drinks soft drinks 1-2 a day.   a1c slightly above.  Pain lately -(r) ankle-tear -seen by podiatry Dr. Edwards.    Lab Results   Component Value Date    HGBA1C 7.1 (H) 08/27/2020       PREVIOUS DIABETES MEDICATIONS TRIED  Metformin-GI/sick  ER and plain -sick  jardiance  trulicity     CURRENT DIABETIC MEDS: trulicity 1.5 mg qweek, jardiance 10 mg daily   BG 0-1x a week     Diabetes Management Status    Statin: Not taking  ACE/ARB: Taking    Screening or Prevention Patient's value Goal Complete/Controlled?   HgA1C Testing and Control   Lab Results   Component Value Date    HGBA1C 7.1 (H) 08/27/2020      Annually/Less than 8% Yes   Lipid profile : 05/01/2020 Annually Yes   LDL control Lab Results   Component Value Date    LDLCALC 57.8 (L) 05/01/2020    Annually/Less than 100 mg/dl  Yes   Nephropathy screening Lab Results   Component Value Date    LABMICR 129.0 02/03/2020     Lab Results   Component Value Date    PROTEINUA 30 (A) 02/12/2013    Annually Yes   Blood pressure BP Readings from Last 1 Encounters:   02/03/20 120/80    Less than 140/90 Yes   Dilated retinal exam Most Recent Eye Exam Date: Not Found Annually Yes   Foot exam   : 03/31/2020 Annually Yes     REVIEW OF SYSTEMS  General: no weakness, fatigue, weight stable  Eyes: no double or blurred vision, eye pain, or redness  Cardiovascular: no chest  "pain, palpitations, edema, or murmurs.   Respiratory: no cough or dyspnea.   GI: no heartburn, nausea, or changes in bowel patterns; good appetite.   Skin: no rashes, dryness, itching, or reactions at insulin injection sites. +hair loss  Neuro: no numbness, tingling, tremors, or vertigo. +right ankle pain  Endocrine: no polyuria, polydipsia, polyphagia, heat or cold intolerance.     Vital Signs  /78 (BP Location: Left arm, Patient Position: Sitting, BP Method: Medium (Manual))   Pulse 88   Resp 20   Ht 5' 7" (1.702 m)   Wt 80.5 kg (177 lb 7.5 oz)   LMP 02/12/2009   SpO2 98%   BMI 27.80 kg/m²     Hemoglobin A1C   Date Value Ref Range Status   08/27/2020 7.1 (H) 4.0 - 5.6 % Final     Comment:     ADA Screening Guidelines:  5.7-6.4%  Consistent with prediabetes  >or=6.5%  Consistent with diabetes  High levels of fetal hemoglobin interfere with the HbA1C  assay. Heterozygous hemoglobin variants (HbS, HgC, etc)do  not significantly interfere with this assay.   However, presence of multiple variants may affect accuracy.     05/01/2020 7.4 (H) 4.0 - 5.6 % Final     Comment:     ADA Screening Guidelines:  5.7-6.4%  Consistent with prediabetes  >or=6.5%  Consistent with diabetes  High levels of fetal hemoglobin interfere with the HbA1C  assay. Heterozygous hemoglobin variants (HbS, HgC, etc)do  not significantly interfere with this assay.   However, presence of multiple variants may affect accuracy.     02/04/2020 10.5 (H) 4.0 - 5.6 % Final     Comment:     ADA Screening Guidelines:  5.7-6.4%  Consistent with prediabetes  >or=6.5%  Consistent with diabetes  High levels of fetal hemoglobin interfere with the HbA1C  assay. Heterozygous hemoglobin variants (HbS, HgC, etc)do  not significantly interfere with this assay.   However, presence of multiple variants may affect accuracy.          Chemistry        Component Value Date/Time     05/01/2020 0725    K 4.1 05/01/2020 0725     05/01/2020 0725    CO2 " 26 05/01/2020 0725    BUN 12 05/01/2020 0725    CREATININE 0.8 05/01/2020 0725     (H) 05/01/2020 0725        Component Value Date/Time    CALCIUM 9.4 05/01/2020 0725    ALKPHOS 61 05/01/2020 0725    AST 33 05/01/2020 0725    ALT 53 (H) 05/01/2020 0725    BILITOT 0.4 05/01/2020 0725    ESTGFRAFRICA >60.0 05/01/2020 0725    EGFRNONAA >60.0 05/01/2020 0725           Lab Results   Component Value Date    TSH 1.811 02/04/2020      Lab Results   Component Value Date    CHOL 134 05/01/2020    CHOL 158 02/04/2020    CHOL 165 01/10/2017     Lab Results   Component Value Date    HDL 22 (L) 05/01/2020    HDL 19 (L) 02/04/2020    HDL 12 (L) 01/10/2017     Lab Results   Component Value Date    LDLCALC 57.8 (L) 05/01/2020    LDLCALC Invalid, Trig>400.0 02/04/2020    LDLCALC Comment 01/10/2017     Lab Results   Component Value Date    TRIG 271 (H) 05/01/2020    TRIG 794 (H) 02/04/2020    TRIG 814 (HH) 01/10/2017     Lab Results   Component Value Date    CHOLHDL 16.4 (L) 05/01/2020    CHOLHDL 12.0 (L) 02/04/2020         PHYSICAL EXAMINATION  Constitutional: Appears well, no distress Reviewed vitals above.  Eyes: conjunctivae & lids intact; PERRLA, EOMs intact; optic discs   Neck: Supple, trachea midline.   Respiratory: No wheezes, even and unlabored  Cardiovascular: RRR; no edema or varicosities  Lymph: deferred   Skin: warm and dry; no injection site reactions, no acanthosis nigracans observed.  Neuro:patient alert and cooperative, normal affect; steady gait.  Psychiatric: judgement & insight intact, orientation of time, place & person intact, memory; mood & affect wnl     Diabetes Foot Exam:   Deferred     Assessment/Plan    1. Type 2 diabetes mellitus without complication, without long-term current use of insulin  Diabetic Eye Screening Photo    TSH    Hemoglobin A1C   2. Mixed hyperlipidemia     3. Counseling and coordination of care     4. Anxiety and depression     5. Hair loss  Ambulatory referral/consult to  Dermatology       1. F/u in 4 mos w/ me   Continue regimen  Can always increase jardiance  No issues at this time --yeast infection/uti-none in the past 6 mos  a1c goal less than 7%  Discussed dietary habits   Exercise helps- 3x a week   Refills for meds sent-30 day  2.   Lab Results   Component Value Date    LDLCALC 57.8 (L) 05/01/2020     At goal   3. See above  4. Stable  F/u with pcp, psych prn  5. Derm referral placed > 2 years hair loss    FOLLOW UP  No follow-ups on file.

## 2020-09-02 ENCOUNTER — OFFICE VISIT (OUTPATIENT)
Dept: INTERNAL MEDICINE | Facility: CLINIC | Age: 40
End: 2020-09-02
Payer: COMMERCIAL

## 2020-09-02 VITALS
SYSTOLIC BLOOD PRESSURE: 110 MMHG | WEIGHT: 177.5 LBS | HEART RATE: 88 BPM | OXYGEN SATURATION: 98 % | RESPIRATION RATE: 20 BRPM | BODY MASS INDEX: 27.86 KG/M2 | DIASTOLIC BLOOD PRESSURE: 78 MMHG | HEIGHT: 67 IN

## 2020-09-02 DIAGNOSIS — F32.A ANXIETY AND DEPRESSION: ICD-10-CM

## 2020-09-02 DIAGNOSIS — L65.9 HAIR LOSS: ICD-10-CM

## 2020-09-02 DIAGNOSIS — F41.9 ANXIETY AND DEPRESSION: ICD-10-CM

## 2020-09-02 DIAGNOSIS — Z71.89 COUNSELING AND COORDINATION OF CARE: ICD-10-CM

## 2020-09-02 DIAGNOSIS — E78.2 MIXED HYPERLIPIDEMIA: ICD-10-CM

## 2020-09-02 DIAGNOSIS — E11.9 TYPE 2 DIABETES MELLITUS WITHOUT COMPLICATION, WITHOUT LONG-TERM CURRENT USE OF INSULIN: Primary | ICD-10-CM

## 2020-09-02 PROCEDURE — 3008F PR BODY MASS INDEX (BMI) DOCUMENTED: ICD-10-PCS | Mod: CPTII,S$GLB,, | Performed by: NURSE PRACTITIONER

## 2020-09-02 PROCEDURE — 3051F HG A1C>EQUAL 7.0%<8.0%: CPT | Mod: CPTII,S$GLB,, | Performed by: NURSE PRACTITIONER

## 2020-09-02 PROCEDURE — 3008F BODY MASS INDEX DOCD: CPT | Mod: CPTII,S$GLB,, | Performed by: NURSE PRACTITIONER

## 2020-09-02 PROCEDURE — 3051F PR MOST RECENT HEMOGLOBIN A1C LEVEL 7.0 - < 8.0%: ICD-10-PCS | Mod: CPTII,S$GLB,, | Performed by: NURSE PRACTITIONER

## 2020-09-02 PROCEDURE — 99214 OFFICE O/P EST MOD 30 MIN: CPT | Mod: S$GLB,,, | Performed by: NURSE PRACTITIONER

## 2020-09-02 PROCEDURE — 99999 PR PBB SHADOW E&M-EST. PATIENT-LVL IV: ICD-10-PCS | Mod: PBBFAC,,, | Performed by: NURSE PRACTITIONER

## 2020-09-02 PROCEDURE — 99999 PR PBB SHADOW E&M-EST. PATIENT-LVL IV: CPT | Mod: PBBFAC,,, | Performed by: NURSE PRACTITIONER

## 2020-09-02 PROCEDURE — 99214 PR OFFICE/OUTPT VISIT, EST, LEVL IV, 30-39 MIN: ICD-10-PCS | Mod: S$GLB,,, | Performed by: NURSE PRACTITIONER

## 2020-09-02 RX ORDER — EMPAGLIFLOZIN 10 MG/1
10 TABLET, FILM COATED ORAL DAILY
Qty: 30 TABLET | Refills: 6 | Status: SHIPPED | OUTPATIENT
Start: 2020-09-02 | End: 2021-05-25 | Stop reason: SDUPTHER

## 2020-09-02 RX ORDER — DULAGLUTIDE 1.5 MG/.5ML
1.5 INJECTION, SOLUTION SUBCUTANEOUS
Qty: 4 PEN | Refills: 6 | Status: SHIPPED | OUTPATIENT
Start: 2020-09-02 | End: 2021-04-01 | Stop reason: SDUPTHER

## 2020-09-02 RX ORDER — ONDANSETRON 4 MG/1
8 TABLET, ORALLY DISINTEGRATING ORAL EVERY 12 HOURS
Qty: 60 TABLET | Refills: 6 | Status: SHIPPED | OUTPATIENT
Start: 2020-09-02 | End: 2022-03-11 | Stop reason: SDUPTHER

## 2020-09-02 NOTE — PATIENT INSTRUCTIONS
Eat fit mc -free   Www.diabetes.org --food hub  Www.exerciseismedicine.org    Www.Company.com.Go!Foton     30-45 grams of carbs per meal   Limit sodas- convert to mini or coke zero  Snacks less than 15 grams of carbs   Watch food labels    Goal  sugar level, no higher than 180    2 hours after eating -blood glucose check  Or before meal for accurate assessment  a1c goal less than 7%, avg 150         Snacks can be an important part of a balanced, healthy meal plan. They allow you to eat more frequently, feeling full and satisfied throughout the day. Also, they allow you to spread carbohydrates evenly, which may stabilize blood sugars.  Plus, snacks are enjoyable!     The amount of carbohydrate needed at snacks varies. Generally, about 15-30 grams of carbohydrate per snack is recommended.  Below you will find some tasty treats.       0-5 gm carb   Crystal Light   Vitamin Water Zero   Herbal tea, unsweetened   2 tsp peanut butter on celery   1./2 cup sugar-free jell-o   1 sugar-free popsicle   ¼ cup blueberries   8oz Blue [place] unsweetened almond milk   5 baby carrots & celery sticks, cucumbers, bell peppers dipped in ¼ cup salsa, 2Tbsp light ranch dressing or 2Tbsp plain Greek yogurt   10 Goldfish crackers   ½ oz low-fat cheese or string cheese   1 closed handful of nuts, unsalted   1 Tbsp of sunflower seeds, unsalted   1 cup Smart Pop popcorn   1 whole grain brown rice cake        15 gm carb   1 small piece of fruit or ½ banana or 1/2 cup lite canned fruit   3 crystal cracker squares   3 cups Smart Pop popcorn, top spray butter, Sandhu lite salt or cinnamon and Truvia   5 Vanilla Wafers   ½ cup low fat, no added sugar ice cream or frozen yogurt (Blue bell, Blue Bunny, Weight Watchers, Skinny Cow)   ½ turkey, ham, or chicken sandwich   ½ c fruit with ½ c Cottage cheese   4-6 unsalted wheat crackers with 1 oz low fat cheese or 1 tbsp peanut butter    30-45 goldfish crackers (depending  on flavor)    7-8 Taoism mini brown rice cakes (caramel, apple cinnamon, chocolate)    12 Taoism mini brown rice cakes (cheddar, bbq, ranch)    1/3 cup hummus dip with raw veg   1/2 whole wheat devan, 1Tbsp hummus   Mini Pizza (1/2 whole wheat English muffin, low-fat  cheese, tomato sauce)   100 calorie snack pack (Oreo, Chips Ahoy, Ritz Mix, Baked Cheetos)   4-6 oz. light or Greek Style yogurt (Chobani, Yoplait, Okios, [place]oneyfield)   ½ cup sugar-free pudding     6 in. wheat tortilla or devan oven toasted chips (topped with spray butter flavoring, cinnamon, Truvia OR spray butter, garlic powder, chili powder)    18 BBQ Popchips (available at Target, Whole Foods, Fresh Market)                   Managing Diabetes: The A1C Test       Healthy red blood cells have some glucose stuck to them. A high A1C means that unhealthy amounts of glucose are stuck to the cells.   What is the A1C test?  Using your meter helps you track your blood sugar every day. But your glucose meter tells you the value at the time of testing only. You also need to know if your treatment plan is keeping you healthy over time. The hemoglobin A1C (or glycated hemoglobin) test can help. This test measures your average blood sugar level over a few months. A higher A1C result means that you have a higher risk of developing complications.  The A1C test  The A1C is a blood test done by your healthcare provider. You will likely have an A1C test every 3 to 6 months.  Your blood glucose goal  A1C has been shown as a percentage. But it can also be shown as a number representing the estimated Average Glucose (eAG). Unlike the A1C percentage, eAG is a number similar to the numbers listed on your daily glucose monitor. Both A1C and eAG measure the amount of glucose stuck to a protein called hemoglobin in red blood cells. Your healthcare provider will help you figure out what your ideal A1C or eAG should be. Your target number will depend on your age,  general health, and other factors. If your current number is too high, your treatment plan may need changes, such as different medicines.  Sample results  Most people aim for an A1c lower than 7%. Thats an eAG less than 154 mg/dL. Or, your healthcare provider may want you to aim for an A1C of 6%. Thats an eAG of 126 mg/dL.     Glucose calculator  Visit http://professional.diabetes.org/diapro/glucose_calc for a chart that helps convert your A1C percentages into eAG numbers.   Date Last Reviewed: 6/1/2016 © 2000-2017 [employer]. [address] [place] [address]. All rights reserved. This information is not intended as a substitute for professional medical care. Always follow your healthcare professional's instructions.        Hypoglycemia (Low Blood Sugar)     Fast-acting sugar includes a cup of nonfat milk.     Too little sugar (glucose) in your blood is called hypoglycemia or low blood sugar. Low blood sugar usually means anything lower than 70 mg/dL. Talk with your healthcare provider about your target range and what level is too low for you. Diabetes itself doesnt cause low blood sugar. But some of the treatments for diabetes, such as pills or insulin, may raise your risk for it. Low blood sugar may cause you to pass out or have a seizure. So always treat low blood sugar right away, but don't overeat.  Special note: Always carry a source of fast-acting sugar and a snack in case of hypoglycemia.   What you may notice  If you have low blood sugar, you may have one or more of these symptoms:  · Shakiness or dizziness  · Cold, clammy skin or sweating  · Feelings of hunger  · Headache  · Nervousness  · A hard, fast heartbeat  · Weakness  · Confusion or irritability  · Blurred vision  · Having nightmares or waking up confused or sweating  · Numbness or tingling in the lips or tongue  What you should do  Here are tips to follow if you have hypoglycemia:   · First check your blood sugar. If it  is too low (out of your target range), eat or drink 15 to 20 grams of fast-acting sugar. This may be 3 to 4 glucose tablets, 4 ounces (half a cup) of fruit juice or regular (nondiet) soda, 8 ounces (1 cup) of fat-free milk, or 1 tablespoon of honey. Dont take more than this, or your blood sugar may go too high.  · Wait 15 minutes. Then recheck your blood sugar if you can.  · If your blood sugar is still too low, repeat the steps above and check your blood sugar again. If your blood sugar still has not returned to your target range, contact your healthcare provider or seek emergency care.  · Once your blood sugar returns to target range, eat a snack or meal.  Preventing low blood sugar  Things you can do include the following:   · If your condition needs a strict treatment plan, eat your meals and snacks at the same times each day. Dont skip meals!  · If your treatment plan lets you change when you eat and what you eat, learn how to change the time and dose of your rapid-acting insulin to match this.   · Ask your healthcare provider if it is safe for you to drink alcohol. Never drink on an empty stomach.  · Take your medicine at the prescribed times.  · Always carry a source of fast-acting sugar and a snack when youre away from home.  Other things to do  Additional tips include the following:  · Carry a medical ID card, a compact USB drive, or wear a medical alert bracelet or necklace. It should say that you have diabetes. It should also say what to do if you pass out or have a seizure.  · Make sure your family, friends, and coworkers know the signs of low blood sugar. Tell them what to do if your blood sugar falls very low and you cant treat yourself.  · Keep a glucagon emergency kit handy. Be sure your family, friends, and coworkers know how and when to use it. Check it regularly and replace the glucagon before it expires.  · Talk with your health care team about other things you can do to prevent low blood  sugar.     If you have unexplained hypoglycemia or hypoglycemia several times, call your healthcare provider.   Date Last Reviewed: 5/1/2016  © 7984-7579 [employer]. [address] [place] [address], [place] [address]. All rights reserved. This information is not intended as a substitute for professional medical care. Always follow your healthcare professional's instructions.        Exercise: Adding Intensity  You have been exercising for 30 minutes most days of the week. Now you can move on to the next stage: increasing the intensity. This means doing your activity in one or more of these ways:  · Longer. Exercise for 30 minutes or more without a break.  · Faster. Hike, run, or skate fast enough to raise your heart rate moderately--as if you had walked fast to catch a bus.  · More often. Do your activity 4 to 6 times a week instead of 1 to 3 times.    Not just gym class  Be creative. You can reach your health and fitness goals in many ways. Try some of these activities:  · Team sports, like basketball or soccer  · Social or recreational activities, like hiking or dancing  · Individual exercise, like cycling, swimming, or skating  · Group fitness classes, like aerobic classes or weight training  Safety first  Whatever activity you choose, think about safety:  · Wear the right safety gear and shoes for your activity.  · Drink plenty of water during and after workouts.  · Wear light-colored clothing if youre out when its dark.  · Make time to warm up before you exercise and cool down after.  · Carry ID (identification) with you if youre out alone. And be sure someone knows where youre going.  · If youre on foot, travel against traffic (except on blind corners). If youre on a bike, go with traffic. Obey the rules of the road.   Tips for sticking with it  · Find a workout partner or sports club. If you know someone is expecting you, youll be less likely to skip your workout.  · Pack a workout bag with  everything you need. Then its ready when you are.  · Choose a few different activities so youll stay interested. Make it fun!  What will help you to stick with it?  1.   2.   3.   Date Last Reviewed: 8/13/2015  © 8342-3533 [employer]. [address] [place] [address]. All rights reserved. This information is not intended as a substitute for professional medical care. Always follow your healthcare professional's instructions.

## 2020-09-25 ENCOUNTER — PATIENT MESSAGE (OUTPATIENT)
Dept: OTHER | Facility: OTHER | Age: 40
End: 2020-09-25

## 2020-10-05 ENCOUNTER — PATIENT MESSAGE (OUTPATIENT)
Dept: ADMINISTRATIVE | Facility: HOSPITAL | Age: 40
End: 2020-10-05

## 2021-01-04 ENCOUNTER — PATIENT MESSAGE (OUTPATIENT)
Dept: ADMINISTRATIVE | Facility: HOSPITAL | Age: 41
End: 2021-01-04

## 2021-01-05 ENCOUNTER — TELEPHONE (OUTPATIENT)
Dept: INTERNAL MEDICINE | Facility: CLINIC | Age: 41
End: 2021-01-05

## 2021-01-05 ENCOUNTER — OFFICE VISIT (OUTPATIENT)
Dept: INTERNAL MEDICINE | Facility: CLINIC | Age: 41
End: 2021-01-05
Payer: COMMERCIAL

## 2021-01-05 DIAGNOSIS — F32.A ANXIETY AND DEPRESSION: ICD-10-CM

## 2021-01-05 DIAGNOSIS — Z11.59 NEED FOR HEPATITIS C SCREENING TEST: ICD-10-CM

## 2021-01-05 DIAGNOSIS — F41.9 ANXIETY AND DEPRESSION: ICD-10-CM

## 2021-01-05 DIAGNOSIS — E55.9 VITAMIN D DEFICIENCY: ICD-10-CM

## 2021-01-05 DIAGNOSIS — E78.2 MIXED HYPERLIPIDEMIA: ICD-10-CM

## 2021-01-05 DIAGNOSIS — E11.9 TYPE 2 DIABETES MELLITUS WITHOUT COMPLICATION, WITHOUT LONG-TERM CURRENT USE OF INSULIN: Primary | ICD-10-CM

## 2021-01-05 DIAGNOSIS — Z71.89 COUNSELING AND COORDINATION OF CARE: ICD-10-CM

## 2021-01-05 PROCEDURE — 99214 PR OFFICE/OUTPT VISIT, EST, LEVL IV, 30-39 MIN: ICD-10-PCS | Mod: 95,,, | Performed by: NURSE PRACTITIONER

## 2021-01-05 PROCEDURE — 3051F HG A1C>EQUAL 7.0%<8.0%: CPT | Mod: CPTII,,, | Performed by: NURSE PRACTITIONER

## 2021-01-05 PROCEDURE — 99214 OFFICE O/P EST MOD 30 MIN: CPT | Mod: 95,,, | Performed by: NURSE PRACTITIONER

## 2021-01-05 PROCEDURE — 3051F PR MOST RECENT HEMOGLOBIN A1C LEVEL 7.0 - < 8.0%: ICD-10-PCS | Mod: CPTII,,, | Performed by: NURSE PRACTITIONER

## 2021-01-05 RX ORDER — ERGOCALCIFEROL 1.25 MG/1
50000 CAPSULE ORAL
Qty: 12 CAPSULE | Refills: 3 | Status: SHIPPED | OUTPATIENT
Start: 2021-01-05 | End: 2022-07-12 | Stop reason: SDUPTHER

## 2021-01-07 ENCOUNTER — LAB VISIT (OUTPATIENT)
Dept: LAB | Facility: HOSPITAL | Age: 41
End: 2021-01-07
Attending: NURSE PRACTITIONER
Payer: COMMERCIAL

## 2021-01-07 DIAGNOSIS — Z11.59 NEED FOR HEPATITIS C SCREENING TEST: ICD-10-CM

## 2021-01-07 DIAGNOSIS — E11.9 TYPE 2 DIABETES MELLITUS WITHOUT COMPLICATION, WITHOUT LONG-TERM CURRENT USE OF INSULIN: ICD-10-CM

## 2021-01-07 LAB
ESTIMATED AVG GLUCOSE: 151 MG/DL (ref 68–131)
HBA1C MFR BLD HPLC: 6.9 % (ref 4–5.6)

## 2021-01-07 PROCEDURE — 36415 COLL VENOUS BLD VENIPUNCTURE: CPT | Mod: PO

## 2021-01-07 PROCEDURE — 83036 HEMOGLOBIN GLYCOSYLATED A1C: CPT

## 2021-01-07 PROCEDURE — 86803 HEPATITIS C AB TEST: CPT

## 2021-01-08 LAB — HCV AB SERPL QL IA: NEGATIVE

## 2021-04-01 DIAGNOSIS — E11.9 TYPE 2 DIABETES MELLITUS WITHOUT COMPLICATION, WITHOUT LONG-TERM CURRENT USE OF INSULIN: Primary | ICD-10-CM

## 2021-04-01 DIAGNOSIS — E78.5 HYPERLIPIDEMIA, UNSPECIFIED HYPERLIPIDEMIA TYPE: ICD-10-CM

## 2021-04-01 RX ORDER — FENOFIBRATE 134 MG/1
CAPSULE ORAL
Qty: 90 CAPSULE | Refills: 0 | Status: SHIPPED | OUTPATIENT
Start: 2021-04-01 | End: 2021-05-25 | Stop reason: SDUPTHER

## 2021-04-05 ENCOUNTER — PATIENT MESSAGE (OUTPATIENT)
Dept: ADMINISTRATIVE | Facility: HOSPITAL | Age: 41
End: 2021-04-05

## 2021-04-05 DIAGNOSIS — E11.9 TYPE 2 DIABETES MELLITUS WITHOUT COMPLICATION, WITHOUT LONG-TERM CURRENT USE OF INSULIN: ICD-10-CM

## 2021-04-05 RX ORDER — DULAGLUTIDE 1.5 MG/.5ML
1.5 INJECTION, SOLUTION SUBCUTANEOUS
Qty: 4 PEN | Refills: 6 | Status: SHIPPED | OUTPATIENT
Start: 2021-04-05 | End: 2021-04-05 | Stop reason: SDUPTHER

## 2021-04-05 RX ORDER — DULAGLUTIDE 1.5 MG/.5ML
1.5 INJECTION, SOLUTION SUBCUTANEOUS
Qty: 4 PEN | Refills: 6 | Status: SHIPPED | OUTPATIENT
Start: 2021-04-05 | End: 2022-03-11

## 2021-04-14 DIAGNOSIS — E11.9 TYPE 2 DIABETES MELLITUS WITHOUT COMPLICATION: ICD-10-CM

## 2021-04-15 ENCOUNTER — PATIENT MESSAGE (OUTPATIENT)
Dept: RESEARCH | Facility: HOSPITAL | Age: 41
End: 2021-04-15

## 2021-05-04 ENCOUNTER — LAB VISIT (OUTPATIENT)
Dept: LAB | Facility: HOSPITAL | Age: 41
End: 2021-05-04
Payer: COMMERCIAL

## 2021-05-04 DIAGNOSIS — E11.9 TYPE 2 DIABETES MELLITUS WITHOUT COMPLICATION, WITHOUT LONG-TERM CURRENT USE OF INSULIN: ICD-10-CM

## 2021-05-04 PROCEDURE — 83036 HEMOGLOBIN GLYCOSYLATED A1C: CPT | Performed by: NURSE PRACTITIONER

## 2021-05-04 PROCEDURE — 36415 COLL VENOUS BLD VENIPUNCTURE: CPT | Mod: PO | Performed by: NURSE PRACTITIONER

## 2021-05-05 LAB
ESTIMATED AVG GLUCOSE: 154 MG/DL (ref 68–131)
HBA1C MFR BLD: 7 % (ref 4–5.6)

## 2021-05-11 ENCOUNTER — OFFICE VISIT (OUTPATIENT)
Dept: INTERNAL MEDICINE | Facility: CLINIC | Age: 41
End: 2021-05-11
Payer: COMMERCIAL

## 2021-05-11 DIAGNOSIS — M79.671 RIGHT FOOT PAIN: ICD-10-CM

## 2021-05-11 DIAGNOSIS — Z71.89 COUNSELING AND COORDINATION OF CARE: ICD-10-CM

## 2021-05-11 DIAGNOSIS — E78.2 MIXED HYPERLIPIDEMIA: ICD-10-CM

## 2021-05-11 DIAGNOSIS — E55.9 VITAMIN D DEFICIENCY: ICD-10-CM

## 2021-05-11 DIAGNOSIS — E11.9 TYPE 2 DIABETES MELLITUS WITHOUT COMPLICATION, WITHOUT LONG-TERM CURRENT USE OF INSULIN: Primary | ICD-10-CM

## 2021-05-11 PROCEDURE — 3051F PR MOST RECENT HEMOGLOBIN A1C LEVEL 7.0 - < 8.0%: ICD-10-PCS | Mod: CPTII,,, | Performed by: NURSE PRACTITIONER

## 2021-05-11 PROCEDURE — 99214 PR OFFICE/OUTPT VISIT, EST, LEVL IV, 30-39 MIN: ICD-10-PCS | Mod: 95,,, | Performed by: NURSE PRACTITIONER

## 2021-05-11 PROCEDURE — 99214 OFFICE O/P EST MOD 30 MIN: CPT | Mod: 95,,, | Performed by: NURSE PRACTITIONER

## 2021-05-11 PROCEDURE — 3051F HG A1C>EQUAL 7.0%<8.0%: CPT | Mod: CPTII,,, | Performed by: NURSE PRACTITIONER

## 2021-05-25 DIAGNOSIS — E78.5 HYPERLIPIDEMIA, UNSPECIFIED HYPERLIPIDEMIA TYPE: ICD-10-CM

## 2021-05-25 DIAGNOSIS — E11.9 TYPE 2 DIABETES MELLITUS WITHOUT COMPLICATION, WITHOUT LONG-TERM CURRENT USE OF INSULIN: ICD-10-CM

## 2021-05-25 RX ORDER — LOSARTAN POTASSIUM 25 MG/1
25 TABLET ORAL DAILY
Qty: 90 TABLET | Refills: 3 | Status: SHIPPED | OUTPATIENT
Start: 2021-05-25 | End: 2022-03-11 | Stop reason: SDUPTHER

## 2021-05-25 RX ORDER — EMPAGLIFLOZIN 10 MG/1
10 TABLET, FILM COATED ORAL DAILY
Qty: 90 TABLET | Refills: 2 | Status: SHIPPED | OUTPATIENT
Start: 2021-05-25 | End: 2022-03-11 | Stop reason: SDUPTHER

## 2021-05-25 RX ORDER — FENOFIBRATE 134 MG/1
CAPSULE ORAL
Qty: 90 CAPSULE | Refills: 3 | Status: SHIPPED | OUTPATIENT
Start: 2021-05-25 | End: 2022-03-11 | Stop reason: SDUPTHER

## 2021-09-29 ENCOUNTER — TELEPHONE (OUTPATIENT)
Dept: INTERNAL MEDICINE | Facility: CLINIC | Age: 41
End: 2021-09-29

## 2021-09-29 ENCOUNTER — PATIENT MESSAGE (OUTPATIENT)
Dept: INTERNAL MEDICINE | Facility: CLINIC | Age: 41
End: 2021-09-29

## 2021-10-01 ENCOUNTER — OFFICE VISIT (OUTPATIENT)
Dept: INTERNAL MEDICINE | Facility: CLINIC | Age: 41
End: 2021-10-01
Payer: COMMERCIAL

## 2021-10-01 VITALS
WEIGHT: 184.75 LBS | DIASTOLIC BLOOD PRESSURE: 85 MMHG | HEART RATE: 83 BPM | BODY MASS INDEX: 29 KG/M2 | SYSTOLIC BLOOD PRESSURE: 128 MMHG | OXYGEN SATURATION: 98 % | HEIGHT: 67 IN

## 2021-10-01 DIAGNOSIS — K59.00 CONSTIPATION, UNSPECIFIED CONSTIPATION TYPE: Primary | ICD-10-CM

## 2021-10-01 PROCEDURE — 99999 PR PBB SHADOW E&M-EST. PATIENT-LVL IV: CPT | Mod: PBBFAC,,,

## 2021-10-01 PROCEDURE — 99213 OFFICE O/P EST LOW 20 MIN: CPT | Mod: S$GLB,,,

## 2021-10-01 PROCEDURE — 99213 PR OFFICE/OUTPT VISIT, EST, LEVL III, 20-29 MIN: ICD-10-PCS | Mod: S$GLB,,,

## 2021-10-01 PROCEDURE — 99999 PR PBB SHADOW E&M-EST. PATIENT-LVL IV: ICD-10-PCS | Mod: PBBFAC,,,

## 2021-10-01 RX ORDER — SYRING-NEEDL,DISP,INSUL,0.3 ML 29 G X1/2"
148 SYRINGE, EMPTY DISPOSABLE MISCELLANEOUS 2 TIMES DAILY PRN
Qty: 3000 ML | Refills: 0 | Status: SHIPPED | OUTPATIENT
Start: 2021-10-01 | End: 2021-10-28

## 2021-10-01 RX ORDER — LURASIDONE HYDROCHLORIDE 80 MG/1
80 TABLET, FILM COATED ORAL DAILY
COMMUNITY

## 2021-10-28 ENCOUNTER — PATIENT MESSAGE (OUTPATIENT)
Dept: GASTROENTEROLOGY | Facility: CLINIC | Age: 41
End: 2021-10-28

## 2021-10-28 ENCOUNTER — TELEPHONE (OUTPATIENT)
Dept: GASTROENTEROLOGY | Facility: CLINIC | Age: 41
End: 2021-10-28
Payer: COMMERCIAL

## 2021-10-28 ENCOUNTER — OFFICE VISIT (OUTPATIENT)
Dept: GASTROENTEROLOGY | Facility: CLINIC | Age: 41
End: 2021-10-28
Payer: COMMERCIAL

## 2021-10-28 ENCOUNTER — HOSPITAL ENCOUNTER (OUTPATIENT)
Dept: RADIOLOGY | Facility: HOSPITAL | Age: 41
Discharge: HOME | End: 2021-10-28
Attending: ORTHOPAEDIC SURGERY
Payer: COMMERCIAL

## 2021-10-28 VITALS — WEIGHT: 183 LBS | BODY MASS INDEX: 28.72 KG/M2 | HEIGHT: 67 IN

## 2021-10-28 DIAGNOSIS — K59.00 CONSTIPATION, UNSPECIFIED CONSTIPATION TYPE: Primary | ICD-10-CM

## 2021-10-28 DIAGNOSIS — R19.4 CHANGE IN BOWEL HABITS: ICD-10-CM

## 2021-10-28 DIAGNOSIS — M79.671 RIGHT FOOT PAIN: ICD-10-CM

## 2021-10-28 DIAGNOSIS — R10.30 LOWER ABDOMINAL PAIN: ICD-10-CM

## 2021-10-28 DIAGNOSIS — R11.0 NAUSEA: ICD-10-CM

## 2021-10-28 DIAGNOSIS — R74.8 ELEVATED LIPASE: ICD-10-CM

## 2021-10-28 PROCEDURE — 3051F HG A1C>EQUAL 7.0%<8.0%: CPT | Mod: CPTII,95,, | Performed by: NURSE PRACTITIONER

## 2021-10-28 PROCEDURE — 73700 CT LOWER EXTREMITY W/O DYE: CPT | Mod: 26,RT,, | Performed by: RADIOLOGY

## 2021-10-28 PROCEDURE — 4010F ACE/ARB THERAPY RXD/TAKEN: CPT | Mod: CPTII,95,, | Performed by: NURSE PRACTITIONER

## 2021-10-28 PROCEDURE — 1160F PR REVIEW ALL MEDS BY PRESCRIBER/CLIN PHARMACIST DOCUMENTED: ICD-10-PCS | Mod: CPTII,95,, | Performed by: NURSE PRACTITIONER

## 2021-10-28 PROCEDURE — 1159F MED LIST DOCD IN RCRD: CPT | Mod: CPTII,95,, | Performed by: NURSE PRACTITIONER

## 2021-10-28 PROCEDURE — 99203 OFFICE O/P NEW LOW 30 MIN: CPT | Mod: 95,,, | Performed by: NURSE PRACTITIONER

## 2021-10-28 PROCEDURE — 3051F PR MOST RECENT HEMOGLOBIN A1C LEVEL 7.0 - < 8.0%: ICD-10-PCS | Mod: CPTII,95,, | Performed by: NURSE PRACTITIONER

## 2021-10-28 PROCEDURE — 4010F PR ACE/ARB THEARPY RXD/TAKEN: ICD-10-PCS | Mod: CPTII,95,, | Performed by: NURSE PRACTITIONER

## 2021-10-28 PROCEDURE — 3008F PR BODY MASS INDEX (BMI) DOCUMENTED: ICD-10-PCS | Mod: CPTII,95,, | Performed by: NURSE PRACTITIONER

## 2021-10-28 PROCEDURE — 1159F PR MEDICATION LIST DOCUMENTED IN MEDICAL RECORD: ICD-10-PCS | Mod: CPTII,95,, | Performed by: NURSE PRACTITIONER

## 2021-10-28 PROCEDURE — 73700 CT FOOT WITHOUT CONTRAST RIGHT: ICD-10-PCS | Mod: 26,RT,, | Performed by: RADIOLOGY

## 2021-10-28 PROCEDURE — 99203 PR OFFICE/OUTPT VISIT, NEW, LEVL III, 30-44 MIN: ICD-10-PCS | Mod: 95,,, | Performed by: NURSE PRACTITIONER

## 2021-10-28 PROCEDURE — 73700 CT LOWER EXTREMITY W/O DYE: CPT | Mod: TC,RT

## 2021-10-28 PROCEDURE — 1160F RVW MEDS BY RX/DR IN RCRD: CPT | Mod: CPTII,95,, | Performed by: NURSE PRACTITIONER

## 2021-10-28 PROCEDURE — 3008F BODY MASS INDEX DOCD: CPT | Mod: CPTII,95,, | Performed by: NURSE PRACTITIONER

## 2021-10-28 RX ORDER — TRAZODONE HYDROCHLORIDE 50 MG/1
TABLET ORAL
COMMUNITY
Start: 2021-10-14

## 2021-10-28 RX ORDER — HYDROXYZINE HYDROCHLORIDE 25 MG/1
TABLET, FILM COATED ORAL
COMMUNITY
Start: 2021-10-14 | End: 2022-10-06

## 2021-12-13 ENCOUNTER — PATIENT MESSAGE (OUTPATIENT)
Dept: INTERNAL MEDICINE | Facility: CLINIC | Age: 41
End: 2021-12-13
Payer: COMMERCIAL

## 2021-12-13 ENCOUNTER — TELEPHONE (OUTPATIENT)
Dept: GASTROENTEROLOGY | Facility: CLINIC | Age: 41
End: 2021-12-13
Payer: COMMERCIAL

## 2021-12-13 ENCOUNTER — TELEPHONE (OUTPATIENT)
Dept: INTERNAL MEDICINE | Facility: CLINIC | Age: 41
End: 2021-12-13
Payer: COMMERCIAL

## 2021-12-13 DIAGNOSIS — E11.9 TYPE 2 DIABETES MELLITUS WITHOUT COMPLICATION, WITHOUT LONG-TERM CURRENT USE OF INSULIN: Primary | ICD-10-CM

## 2021-12-14 RX ORDER — INSULIN PUMP SYRINGE, 3 ML
EACH MISCELLANEOUS
Qty: 1 EACH | Refills: 0 | Status: SHIPPED | OUTPATIENT
Start: 2021-12-14 | End: 2023-10-26

## 2021-12-14 RX ORDER — LANCETS
EACH MISCELLANEOUS
Qty: 200 EACH | Refills: 3 | Status: SHIPPED | OUTPATIENT
Start: 2021-12-14

## 2021-12-22 DIAGNOSIS — Z12.31 OTHER SCREENING MAMMOGRAM: ICD-10-CM

## 2022-01-19 ENCOUNTER — PATIENT MESSAGE (OUTPATIENT)
Dept: ADMINISTRATIVE | Facility: HOSPITAL | Age: 42
End: 2022-01-19
Payer: COMMERCIAL

## 2022-03-04 ENCOUNTER — TELEPHONE (OUTPATIENT)
Dept: INTERNAL MEDICINE | Facility: CLINIC | Age: 42
End: 2022-03-04
Payer: COMMERCIAL

## 2022-03-04 DIAGNOSIS — E11.9 TYPE 2 DIABETES MELLITUS WITHOUT COMPLICATION, WITHOUT LONG-TERM CURRENT USE OF INSULIN: Primary | ICD-10-CM

## 2022-03-04 NOTE — TELEPHONE ENCOUNTER
----- Message from Faviola Lynn sent at 3/4/2022  3:32 PM CST -----  Contact: [phone] Patient  Pt is requesting an appt for her diabetes follow up. Pt states a message can be sent to her portal.

## 2022-03-09 ENCOUNTER — TELEPHONE (OUTPATIENT)
Dept: INTERNAL MEDICINE | Facility: CLINIC | Age: 42
End: 2022-03-09
Payer: COMMERCIAL

## 2022-03-09 NOTE — TELEPHONE ENCOUNTER
----- Message from Robyn Matthew sent at 3/9/2022  1:26 PM CST -----  Contact: Pt Mobile/Home [phone]  Patient said that she called last week trying to schedule an appointment for a diabetes check, and she also said that she running out of medications. Patient said that she never received a call back for her to be scheduled.          [facility] Progress Note - Hospitalist Service, Gold 6       Date of Admission:  7/26/2020    Assessment & Plan   Monalisa Olguin is a 54 year old female with history of decompensated etoh cirrhosis c/b ascites with SBP, HE, portal HTN, CKD s/p nephrectomy (donated kidney to sister), chronic anemia and thrombocytopenia, GERD, and chronic pain who was admitted to [facility] 7/26/2020 with encephalopathy and acute kidney injury.     #Toxic metabolic encephalopathy  Presented with significant AMS in setting of N/V unable to take oral lactulose. Previous admission with similar presentation due to HE that improved with lactulose. Patient likely has underlying dementia per previous neuro psych testing on 5/27. CT head without acute process. Presented with asterixis on exam with elevated ammonia so possibly due to HE. Could also have a component of uremia with ALFREDO and elevated BUN 86. Glucose and electrolytes stable except for hyponatremia as noted below, which resolved with fluids. Infectious work up with CXR 7/26 diffuse bilateral interstitial and alveolar opacities due to pulmonary edema vs infiltrate. Legionella and strep pneumoniae urinary antigen negative.  Procalcitonin 0.65, but also in setting of ALFREDO. Patient completed 5 day course of Ceftriaxone/Azithromycin for CAP coverage. Blood cultures no growth. UA bland. Diagnostic paracentesis not consistent with SBP (). COVID 19 PCR negative x2 (7/24 and 7/27).  Treated with high dose of thiamine. VSS on room air and afebrile. Mental status improved with lactulose per [place] protocol. Nearing baseline mental status.   - Continue [id number] mg daily   - Lactulose per [place] protocol   - Continue PTA Rifaximin 550 mg BID, folic acid  - Discontinue Buspar and Abilify. Avoid delirogenic medications   - PT/OT consulted and recommending TCU, awaiting bed, per SW will likely not happen until Monday     #Decompensated  etoh cirrhosis  Followed by Dr. Kovacs of Hepatology. Sober since 2019. C/b ascites with SBP on ppx cipro, HE, thrombocytopenia. PTA on cipro 250 mg daily, lasix 40 mg daily, spironolactone 25 mg daily, lactulose 30 mL TID, midodrine 10 mg TID, rifaximin 550 mg BID. Alk phos elevation slightly up from baseline, other LFTs near baseline (T. Bili ranges 9.0s-14.0s). FEth negative.  No RUQ tenderness on exam, with Abd US with patent vasculature. Paracentesis with IR removed 2800cc fluid, with analysis not consistent with SBP.  Completed IV Vitamin K x 3 days. Per Hepatology, patient is not a transplant candidate with underlying baseline cognitive impairment. MELD currently 30.   - Hepatology consulted, appreciate recs  - Trend MELD labs (CMP, INR)   - Hold lasix and spironolactone in setting of ALFREDO, will evaluate volume status daily   - Continue PTA Cipro 250 mg daily   - Continue PTA Midodrine   - Continue home Rifaxamin and lactulose, Treat for HE as noted above   - Discussed goals of care since pt not a candidate for transplant and with worsening renal failure as noted below. Patient's  wants to continue with getting patient to TCU, but will discuss with family about consideration of hospice if patient status worsens.     MELD-Na score: 30 at 8/1/2020  7:39 AM  MELD score: 30 at 8/1/2020  7:39 AM  Calculated from:  Serum Creatinine: 2.13 mg/dL at 8/1/2020  7:39 AM  Serum Sodium: 139 mmol/L (Rounded to 137 mmol/L) at 8/1/2020  7:39 AM  Total Bilirubin: 13.9 mg/dL at 8/1/2020  7:39 AM  INR(ratio): 1.82 at 8/1/2020  7:39 AM  Age: 54 years 9 months    #ALFREDO on CKD III-IV, possible progression to CKD stage V  #Metabolic acidosis   Baseline Cr labile, ranging around 1.7-2.4. H/o nephrectomy (donated kidney to sister). Cr elevated to 4.19, BUN 86 on presentation. Urinalysis with hyaline casts, otherwise bland. Renal US normal. FENa 0.4. Nephrology feels this could have been persistent pre-renal vs ATN vs HRS. IF HRS,  unfortunately patient is not a liver transplant candidate. Currently making urine and no indications to start HD at this time.  Creatinine improving slowly at 2.13 and GFR 26.   - Nephrology consulted, appreciate recommendations   - Continue to hold diuretics, per nephrology and assess fluid status daily to determine when to resume  - Completed Albumin challenge 50 g daily (4/4 days)   - Continue Subcutaneous octreotide 100 mcg TID while admitted   - Sodium bicarb 1300 mg BID   - Strict I&Os, daily weights   - Follow up with nephrology in 2-3 weeks after discharge     #Hypercalcemia   Calcium slowly up trending. Mild hypercalcemia to 10.5.  Likely due to prolonged immobility while here in the hospital. Unlikely contributing to mental status.   -Encourage PO hydration and ambulation   -Trend     #Acute on chronic anemia  Baseline hemoglobin 7.0s-8.0s. Dropped to 6.2, but improved to 8.0s after 2 units of pRBC transfusion. No si/sx of bleeding.  Iron studies suggestive of anemia of chronic disease (elevated ferritin, low normal TIBC). Positive occult stool, but stool brown w/o gross signs of melena/BRBPR and on oral iron. H/H trended and stable.   -Continue PTA iron and folic acid   -Trend CBC daily  -Transfuse for hemoglobin goal > 7.0   - IF H/H drops again, will discuss with GI     #Hyponatremia, resolved    Na 127 on admission, previously normal last month. Etiology possible hypovolemia 2/2 poor oral intake, GI losses, and resolved after receiving IVF and holding diuretics.   - Nephrology consult as above   - BMP daily     #Depression  Continue PTA fluoxetine 60 mg daily. Discontinue Abilify and trazodone with confusion. Discontinue Buspar which is contraindicated in ESRD.    #Chronic thrombocytopenia   Baseline platelets range 30s-60s. No signs or symptoms of bleeding. Currently 26. Not on any heparin agents.  -Trend  -Transfuse for goal platelets >10 in setting of no bleeding     #GERD  Continue omeprazole 20 mg  daily       Diet: Advance Diet as Tolerated: Regular Diet Adult  Snacks/Supplements Adult: Boost Plus; Between Meals  Room Service    DVT Prophylaxis: Pneumatic Compression Devices  Gonzalez Catheter: not present  Code Status: Full Code         Disposition Plan   Expected discharge: 2 - 3 days, recommended to transitional care unit once safe disposition plan/ TCU bed available.  Entered: Palmira Bullock PA-C 08/01/2020, 8:33 AM       The patient's care was discussed with the Attending Physician, Dr. Jorge Alberto Gutierrez, Bedside Nurse and Patient.    Palmira Bullock PA-C  Hospitalist Service, [address]  Pager: [phone]  Please see sticky note for cross cover information  ______________________________________________________________________    Interval History   No overnight events.     No complaints. Finished eating breakfast with omelette and hash browns.  Asking what the plan is today. Denies any F/C, N/V, abdominal pain, CP, SOB, orthopnea, or dysuria. States she is urinating normally. Feels that her abdomen is distended but normal for her, and denies any discomfort due to distention. At the end of the conversation, patient able to verbalize plan that we are waiting for social work and TCU placement.      Data reviewed today: I reviewed all medications, new labs and imaging results over the last 24 hours.     Physical Exam   Vital Signs: Temp: 97.1  F (36.2  C) Temp src: Oral BP: 102/61 Pulse: 70 Heart Rate: 72 Resp: 12 SpO2: 99 % O2 Device: None (Room air)    Weight: 130 lbs 4.67 oz  GENERAL: A&O to self, place, time (year, month, and day of the week). Thinks Evangelist is president.  NAD. Chronically ill appearing.   HEENT: Icteric sclera. Moist mucous membranes.   NECK: Trachea midline.   CARDIOVASCULAR: RRR. S1, S2. + systolic murmur. No rubs, or gallops.   RESPIRATORY: Effort normal on RA. Rales up to mid lung bilaterally. No wheezing or rhonchi.  no use of accessory  muscles, no retractions, respirations unlabored   GI: Abdomen distended but soft, non-tender abdomen without rebound or guarding, no hepatosplenomegaly, no palpable masses, normoactive bowel sounds present  MUSCULOSKELETAL: No edema of LE bilaterally.  No calf asymmetry, erythema, or tenderness.   NEUROLOGICAL: CN grossly intact. No asterixis. Moving extremities symmetrically. Non-tremulous.    SKIN: Intact. Warm and dry. No rashes or lesions. Jaundice     Data   Recent Labs   Lab 08/01/20  0739 07/31/20  0704 07/30/20  0638  07/26/20  1232   WBC 4.6 5.0 6.3   < > 8.8   HGB 8.6* 8.4* 8.1*   < > 7.9*   * 101* 101*   < > 103*   PLT 26* 30* 36*   < > 74*   INR 1.82* 1.82* 1.84*   < > 1.26*    138 140   < > 127*   POTASSIUM 4.2 4.0 3.8   < > 4.5   CHLORIDE 113* 111* 113*   < > 100   CO2 17* 14* 15*   < > 17*   BUN 54* 68* 70*   < > 86*   CR 2.13* 2.89* 3.77*   < > 4.19*   ANIONGAP 8 12 11   < > 11   ELSIE 10.5* 10.2* 10.2*   < > 9.9   GLC 98 95 108*   < > 112*   ALBUMIN 3.2* 3.3* 3.8   < > 2.6*   PROTTOTAL 4.8* 5.1* 5.6*   < > 5.5*   BILITOTAL 13.9* 13.6* 12.8*   < > 12.6*   ALKPHOS 202* 208* 200*   < > 340*   ALT 16 18 18   < > 36   AST 24 27 30   < > 68*   LIPASE  --   --   --   --  132    < > = values in this interval not displayed.     No results found for this or any previous visit (from the past 24 hour(s)).  Medications     - MEDICATION INSTRUCTIONS -         ciprofloxacin  250 mg Oral Q24H MATT     ferrous sulfate  325 mg Oral BID     FLUoxetine  60 mg Oral Daily     folic acid  1 mg Oral Daily     lactulose  20 g Oral TID     midodrine  10 mg Oral TID w/meals     octreotide  100 mcg Subcutaneous TID     omeprazole  20 mg Oral Daily     rifaximin  550 mg Oral BID     sodium bicarbonate  1,300 mg Oral BID     sodium chloride (PF)  3 mL Intracatheter Q8H     vitamin B1  100 mg Oral Daily     ursodiol  500 mg Oral BID

## 2022-03-10 ENCOUNTER — LAB VISIT (OUTPATIENT)
Dept: LAB | Facility: HOSPITAL | Age: 42
End: 2022-03-10
Payer: COMMERCIAL

## 2022-03-10 DIAGNOSIS — E11.9 TYPE 2 DIABETES MELLITUS WITHOUT COMPLICATION, WITHOUT LONG-TERM CURRENT USE OF INSULIN: ICD-10-CM

## 2022-03-10 LAB
ESTIMATED AVG GLUCOSE: 163 MG/DL (ref 68–131)
HBA1C MFR BLD: 7.3 % (ref 4–5.6)

## 2022-03-10 PROCEDURE — 83036 HEMOGLOBIN GLYCOSYLATED A1C: CPT | Performed by: NURSE PRACTITIONER

## 2022-03-10 PROCEDURE — 36415 COLL VENOUS BLD VENIPUNCTURE: CPT | Performed by: NURSE PRACTITIONER

## 2022-03-11 ENCOUNTER — OFFICE VISIT (OUTPATIENT)
Dept: INTERNAL MEDICINE | Facility: CLINIC | Age: 42
End: 2022-03-11
Payer: COMMERCIAL

## 2022-03-11 VITALS
HEIGHT: 64 IN | WEIGHT: 181.88 LBS | BODY MASS INDEX: 31.05 KG/M2 | OXYGEN SATURATION: 99 % | SYSTOLIC BLOOD PRESSURE: 111 MMHG | HEART RATE: 94 BPM | DIASTOLIC BLOOD PRESSURE: 82 MMHG

## 2022-03-11 DIAGNOSIS — E78.5 HYPERLIPIDEMIA, UNSPECIFIED HYPERLIPIDEMIA TYPE: ICD-10-CM

## 2022-03-11 DIAGNOSIS — Z71.89 COUNSELING AND COORDINATION OF CARE: ICD-10-CM

## 2022-03-11 DIAGNOSIS — E78.2 MIXED HYPERLIPIDEMIA: ICD-10-CM

## 2022-03-11 DIAGNOSIS — F41.9 ANXIETY AND DEPRESSION: ICD-10-CM

## 2022-03-11 DIAGNOSIS — E11.9 TYPE 2 DIABETES MELLITUS WITHOUT COMPLICATION, WITHOUT LONG-TERM CURRENT USE OF INSULIN: Primary | ICD-10-CM

## 2022-03-11 DIAGNOSIS — F32.A ANXIETY AND DEPRESSION: ICD-10-CM

## 2022-03-11 LAB — GLUCOSE SERPL-MCNC: 175 MG/DL (ref 70–110)

## 2022-03-11 PROCEDURE — 99214 OFFICE O/P EST MOD 30 MIN: CPT | Mod: S$GLB,,, | Performed by: NURSE PRACTITIONER

## 2022-03-11 PROCEDURE — 4010F PR ACE/ARB THEARPY RXD/TAKEN: ICD-10-PCS | Mod: CPTII,S$GLB,, | Performed by: NURSE PRACTITIONER

## 2022-03-11 PROCEDURE — 3079F DIAST BP 80-89 MM HG: CPT | Mod: CPTII,S$GLB,, | Performed by: NURSE PRACTITIONER

## 2022-03-11 PROCEDURE — 1160F RVW MEDS BY RX/DR IN RCRD: CPT | Mod: CPTII,S$GLB,, | Performed by: NURSE PRACTITIONER

## 2022-03-11 PROCEDURE — 4010F ACE/ARB THERAPY RXD/TAKEN: CPT | Mod: CPTII,S$GLB,, | Performed by: NURSE PRACTITIONER

## 2022-03-11 PROCEDURE — 3008F BODY MASS INDEX DOCD: CPT | Mod: CPTII,S$GLB,, | Performed by: NURSE PRACTITIONER

## 2022-03-11 PROCEDURE — 3079F PR MOST RECENT DIASTOLIC BLOOD PRESSURE 80-89 MM HG: ICD-10-PCS | Mod: CPTII,S$GLB,, | Performed by: NURSE PRACTITIONER

## 2022-03-11 PROCEDURE — 3051F HG A1C>EQUAL 7.0%<8.0%: CPT | Mod: CPTII,S$GLB,, | Performed by: NURSE PRACTITIONER

## 2022-03-11 PROCEDURE — 99999 PR PBB SHADOW E&M-EST. PATIENT-LVL IV: CPT | Mod: PBBFAC,,, | Performed by: NURSE PRACTITIONER

## 2022-03-11 PROCEDURE — 1159F PR MEDICATION LIST DOCUMENTED IN MEDICAL RECORD: ICD-10-PCS | Mod: CPTII,S$GLB,, | Performed by: NURSE PRACTITIONER

## 2022-03-11 PROCEDURE — 1159F MED LIST DOCD IN RCRD: CPT | Mod: CPTII,S$GLB,, | Performed by: NURSE PRACTITIONER

## 2022-03-11 PROCEDURE — 3074F PR MOST RECENT SYSTOLIC BLOOD PRESSURE < 130 MM HG: ICD-10-PCS | Mod: CPTII,S$GLB,, | Performed by: NURSE PRACTITIONER

## 2022-03-11 PROCEDURE — 99214 PR OFFICE/OUTPT VISIT, EST, LEVL IV, 30-39 MIN: ICD-10-PCS | Mod: S$GLB,,, | Performed by: NURSE PRACTITIONER

## 2022-03-11 PROCEDURE — 82962 POCT GLUCOSE, HAND-HELD DEVICE: ICD-10-PCS | Mod: S$GLB,,, | Performed by: NURSE PRACTITIONER

## 2022-03-11 PROCEDURE — 3074F SYST BP LT 130 MM HG: CPT | Mod: CPTII,S$GLB,, | Performed by: NURSE PRACTITIONER

## 2022-03-11 PROCEDURE — 3008F PR BODY MASS INDEX (BMI) DOCUMENTED: ICD-10-PCS | Mod: CPTII,S$GLB,, | Performed by: NURSE PRACTITIONER

## 2022-03-11 PROCEDURE — 82962 GLUCOSE BLOOD TEST: CPT | Mod: S$GLB,,, | Performed by: NURSE PRACTITIONER

## 2022-03-11 PROCEDURE — 1160F PR REVIEW ALL MEDS BY PRESCRIBER/CLIN PHARMACIST DOCUMENTED: ICD-10-PCS | Mod: CPTII,S$GLB,, | Performed by: NURSE PRACTITIONER

## 2022-03-11 PROCEDURE — 3051F PR MOST RECENT HEMOGLOBIN A1C LEVEL 7.0 - < 8.0%: ICD-10-PCS | Mod: CPTII,S$GLB,, | Performed by: NURSE PRACTITIONER

## 2022-03-11 PROCEDURE — 99999 PR PBB SHADOW E&M-EST. PATIENT-LVL IV: ICD-10-PCS | Mod: PBBFAC,,, | Performed by: NURSE PRACTITIONER

## 2022-03-11 RX ORDER — ONDANSETRON 4 MG/1
8 TABLET, ORALLY DISINTEGRATING ORAL EVERY 12 HOURS
Qty: 60 TABLET | Refills: 6 | Status: SHIPPED | OUTPATIENT
Start: 2022-03-11 | End: 2023-06-16 | Stop reason: SDUPTHER

## 2022-03-11 RX ORDER — DULAGLUTIDE 3 MG/.5ML
3 INJECTION, SOLUTION SUBCUTANEOUS
Qty: 4 PEN | Refills: 6 | Status: SHIPPED | OUTPATIENT
Start: 2022-03-11 | End: 2022-07-12 | Stop reason: SDUPTHER

## 2022-03-11 RX ORDER — FENOFIBRATE 134 MG/1
CAPSULE ORAL
Qty: 90 CAPSULE | Refills: 3 | Status: SHIPPED | OUTPATIENT
Start: 2022-03-11 | End: 2023-05-02 | Stop reason: SDUPTHER

## 2022-03-11 RX ORDER — EMPAGLIFLOZIN 10 MG/1
10 TABLET, FILM COATED ORAL DAILY
Qty: 90 TABLET | Refills: 2 | Status: SHIPPED | OUTPATIENT
Start: 2022-03-11 | End: 2022-07-12 | Stop reason: SDUPTHER

## 2022-03-11 RX ORDER — LOSARTAN POTASSIUM 25 MG/1
25 TABLET ORAL DAILY
Qty: 90 TABLET | Refills: 3 | Status: SHIPPED | OUTPATIENT
Start: 2022-03-11 | End: 2023-05-02 | Stop reason: SDUPTHER

## 2022-03-11 NOTE — PROGRESS NOTES
CHIEF COMPLAINT: Type 2 Diabetes     HPI: Mrs. Codi Wilkerson is a 41 y.o. female who was diagnosed with Type 2 DM x 6 years old.   Last visit done via virtual fall 2020.  Past Medical History:   Diagnosis Date    Anxiety and depression     psychiatrist Dr. Casanova    Bilateral ovarian cysts 5/30/2017    Diabetes mellitus 2014    type 2    Elevated CEA 5/12/2017    Female pelvic pain 5/30/2017    GERD (gastroesophageal reflux disease)     HLD (hyperlipidemia)     Lesion of right lung 7/5/2017    Migraine headache     Right ovarian cyst 5/12/2017   a1c trended up from 6.9% to 7%  To 7.3%     Lab Results   Component Value Date    HGBA1C 7.3 (H) 03/10/2022     Seen by podiatry Dr. Edwards.  Concern w/ foot pain bilateral 6/10 x 2 days.     Not checking sugar levels often  PREVIOUS DIABETES MEDICATIONS TRIED  Metformin-GI/sick  ER and plain -sick  jardiance  trulicity     CURRENT DIABETIC MEDS: trulicity 1.5 mg qweek, jardiance 10 mg daily       Diabetes Management Status    Statin: Not taking  ACE/ARB: Taking    Screening or Prevention Patient's value Goal Complete/Controlled?   HgA1C Testing and Control   Lab Results   Component Value Date    HGBA1C 7.3 (H) 03/10/2022      Annually/Less than 8% Yes   Lipid profile : 05/01/2020 Annually Yes   LDL control Lab Results   Component Value Date    LDLCALC 57.8 (L) 05/01/2020    Annually/Less than 100 mg/dl  Yes   Nephropathy screening Lab Results   Component Value Date    LABMICR 129.0 02/03/2020     Lab Results   Component Value Date    PROTEINUA 30 (A) 02/12/2013    Annually Yes   Blood pressure BP Readings from Last 1 Encounters:   12/20/21 128/80    Less than 140/90 Yes   Dilated retinal exam : 04/12/2021 Annually Yes   Foot exam   : 03/11/2022 Annually Yes     REVIEW OF SYSTEMS  General: no weakness, fatigue, weight  3# loss from oct 2021   Eyes: no double or blurred vision, eye pain, or redness  Cardiovascular: no chest pain, palpitations, edema, or  "murmurs.   Respiratory: no cough or dyspnea.   GI: no heartburn, nausea, or changes in bowel patterns; good appetite.   Skin: no rashes, dryness, itching, or reactions at insulin injection sites.   Neuro: no numbness, tingling, tremors, or vertigo.   Endocrine: no polyuria, polydipsia, polyphagia, heat or cold intolerance.     Vital Signs  /82 (BP Location: Left arm, Patient Position: Sitting, BP Method: Medium (Manual))   Pulse 94   Ht 5' 4" (1.626 m)   Wt 82.5 kg (181 lb 14.1 oz)   LMP 02/12/2009   SpO2 99%   BMI 31.22 kg/m²     Hemoglobin A1C   Date Value Ref Range Status   03/10/2022 7.3 (H) 4.0 - 5.6 % Final     Comment:     ADA Screening Guidelines:  5.7-6.4%  Consistent with prediabetes  >or=6.5%  Consistent with diabetes    High levels of fetal hemoglobin interfere with the HbA1C  assay. Heterozygous hemoglobin variants (HbS, HgC, etc)do  not significantly interfere with this assay.   However, presence of multiple variants may affect accuracy.     05/04/2021 7.0 (H) 4.0 - 5.6 % Final     Comment:     ADA Screening Guidelines:  5.7-6.4%  Consistent with prediabetes  >or=6.5%  Consistent with diabetes    High levels of fetal hemoglobin interfere with the HbA1C  assay. Heterozygous hemoglobin variants (HbS, HgC, etc)do  not significantly interfere with this assay.   However, presence of multiple variants may affect accuracy.     01/07/2021 6.9 (H) 4.0 - 5.6 % Final     Comment:     ADA Screening Guidelines:  5.7-6.4%  Consistent with prediabetes  >or=6.5%  Consistent with diabetes  High levels of fetal hemoglobin interfere with the HbA1C  assay. Heterozygous hemoglobin variants (HbS, HgC, etc)do  not significantly interfere with this assay.   However, presence of multiple variants may affect accuracy.          Chemistry        Component Value Date/Time     05/01/2020 0725    K 4.1 05/01/2020 0725     05/01/2020 0725    CO2 26 05/01/2020 0725    BUN 12 05/01/2020 0725    CREATININE 0.8 " 05/01/2020 0725     (H) 05/01/2020 0725        Component Value Date/Time    CALCIUM 9.4 05/01/2020 0725    ALKPHOS 61 05/01/2020 0725    AST 33 05/01/2020 0725    ALT 53 (H) 05/01/2020 0725    BILITOT 0.4 05/01/2020 0725    ESTGFRAFRICA >60.0 05/01/2020 0725    EGFRNONAA >60.0 05/01/2020 0725           Lab Results   Component Value Date    TSH 1.811 02/04/2020      Lab Results   Component Value Date    CHOL 134 05/01/2020    CHOL 158 02/04/2020    CHOL 165 01/10/2017     Lab Results   Component Value Date    HDL 22 (L) 05/01/2020    HDL 19 (L) 02/04/2020    HDL 12 (L) 01/10/2017     Lab Results   Component Value Date    LDLCALC 57.8 (L) 05/01/2020    LDLCALC Invalid, Trig>400.0 02/04/2020    LDLCALC Comment 01/10/2017     Lab Results   Component Value Date    TRIG 271 (H) 05/01/2020    TRIG 794 (H) 02/04/2020    TRIG 814 (HH) 01/10/2017     Lab Results   Component Value Date    CHOLHDL 16.4 (L) 05/01/2020    CHOLHDL 12.0 (L) 02/04/2020         PHYSICAL EXAMINATION  Constitutional: Appears well, no distress Reviewed vitals above.  Eyes: conjunctivae & lids intact; PERRLA, EOMs intact; optic discs   Neck: Supple, trachea midline.   Respiratory: No wheezes, even and unlabored  Cardiovascular: deferred  Lymph: deferred   Skin: warm and dry; no injection site reactions, no acanthosis nigracans observed.  Neuro:patient alert and cooperative, normal affect; steady gait.  Psychiatric: judgement & insight intact, orientation of time, place & person intact, memory; mood & affect wnl     Diabetes Foot Exam:   .Protective Sensation (w/ 10 gram monofilament):  Right: Intact  Left: Intact    Visual Inspection:  Normal -  Bilateral and Dry Skin -  Bilateral    Pedal Pulses:   Right: Present  Left: Present    Posterior tibialis:   Right:Present  Left: Present      Assessment/Plan    1. Type 2 diabetes mellitus without complication, without long-term current use of insulin  Hemoglobin A1C    Microalbumin/Creatinine Ratio,  Urine    POCT Glucose, Hand-Held Device    losartan (COZAAR) 25 MG tablet   2. Mixed hyperlipidemia  Lipid Panel   3. Anxiety and depression     4. Counseling and coordination of care     5. Hyperlipidemia, unspecified hyperlipidemia type  fenofibrate micronized (LOFIBRA) 134 MG Cap     1. F/u in 4 mos w/ me  Increase trulicity to 3 mg weekly  Continue jardiance 10 mg daily   poct glucose : 175 mg/dl   rx sent   Discussed dietary habits, stressors  2.   Lab Results   Component Value Date    LDLCALC 57.8 (L) 05/01/2020     At goal   Lipid next time   On fenofibrate  3. F/u with psych, on med  4. See above  5. Same  FOLLOW UP  Follow up in about 4 months (around 7/11/2022).

## 2022-03-11 NOTE — PATIENT INSTRUCTIONS
A1c urine mac lipid panel prior (1-7 days before appt)  Follow up in 4-5 months w/Irielle     Increase trulicity to 3 mg weekly  Jardiance 10 mg daily     Lab Results   Component Value Date    HGBA1C 7.3 (H) 03/10/2022     Goal for sugar  no higher than 180     [email or web address]  Eat fit mc  Myfitnesspal mc  [email or web address]

## 2022-03-16 ENCOUNTER — PATIENT MESSAGE (OUTPATIENT)
Dept: ADMINISTRATIVE | Facility: HOSPITAL | Age: 42
End: 2022-03-16
Payer: COMMERCIAL

## 2022-03-19 ENCOUNTER — OFFICE VISIT (OUTPATIENT)
Dept: URGENT CARE | Facility: CLINIC | Age: 42
End: 2022-03-19
Payer: COMMERCIAL

## 2022-03-19 VITALS
HEIGHT: 64 IN | DIASTOLIC BLOOD PRESSURE: 81 MMHG | OXYGEN SATURATION: 97 % | RESPIRATION RATE: 20 BRPM | BODY MASS INDEX: 30.73 KG/M2 | WEIGHT: 180 LBS | TEMPERATURE: 97 F | HEART RATE: 90 BPM | SYSTOLIC BLOOD PRESSURE: 120 MMHG

## 2022-03-19 DIAGNOSIS — M79.671 HEEL PAIN, BILATERAL: Primary | ICD-10-CM

## 2022-03-19 DIAGNOSIS — M79.672 HEEL PAIN, BILATERAL: Primary | ICD-10-CM

## 2022-03-19 PROCEDURE — 3008F BODY MASS INDEX DOCD: CPT | Mod: CPTII,S$GLB,, | Performed by: PHYSICIAN ASSISTANT

## 2022-03-19 PROCEDURE — 1160F PR REVIEW ALL MEDS BY PRESCRIBER/CLIN PHARMACIST DOCUMENTED: ICD-10-PCS | Mod: CPTII,S$GLB,, | Performed by: PHYSICIAN ASSISTANT

## 2022-03-19 PROCEDURE — 3051F HG A1C>EQUAL 7.0%<8.0%: CPT | Mod: CPTII,S$GLB,, | Performed by: PHYSICIAN ASSISTANT

## 2022-03-19 PROCEDURE — 3074F SYST BP LT 130 MM HG: CPT | Mod: CPTII,S$GLB,, | Performed by: PHYSICIAN ASSISTANT

## 2022-03-19 PROCEDURE — 1160F RVW MEDS BY RX/DR IN RCRD: CPT | Mod: CPTII,S$GLB,, | Performed by: PHYSICIAN ASSISTANT

## 2022-03-19 PROCEDURE — 73650 XR CALCANEUS 2 VIEW RIGHT: ICD-10-PCS | Mod: RT,S$GLB,, | Performed by: INTERNAL MEDICINE

## 2022-03-19 PROCEDURE — 3074F PR MOST RECENT SYSTOLIC BLOOD PRESSURE < 130 MM HG: ICD-10-PCS | Mod: CPTII,S$GLB,, | Performed by: PHYSICIAN ASSISTANT

## 2022-03-19 PROCEDURE — 4010F ACE/ARB THERAPY RXD/TAKEN: CPT | Mod: CPTII,S$GLB,, | Performed by: PHYSICIAN ASSISTANT

## 2022-03-19 PROCEDURE — 3079F DIAST BP 80-89 MM HG: CPT | Mod: CPTII,S$GLB,, | Performed by: PHYSICIAN ASSISTANT

## 2022-03-19 PROCEDURE — 1159F MED LIST DOCD IN RCRD: CPT | Mod: CPTII,S$GLB,, | Performed by: PHYSICIAN ASSISTANT

## 2022-03-19 PROCEDURE — 3051F PR MOST RECENT HEMOGLOBIN A1C LEVEL 7.0 - < 8.0%: ICD-10-PCS | Mod: CPTII,S$GLB,, | Performed by: PHYSICIAN ASSISTANT

## 2022-03-19 PROCEDURE — 1159F PR MEDICATION LIST DOCUMENTED IN MEDICAL RECORD: ICD-10-PCS | Mod: CPTII,S$GLB,, | Performed by: PHYSICIAN ASSISTANT

## 2022-03-19 PROCEDURE — 3008F PR BODY MASS INDEX (BMI) DOCUMENTED: ICD-10-PCS | Mod: CPTII,S$GLB,, | Performed by: PHYSICIAN ASSISTANT

## 2022-03-19 PROCEDURE — 73650 X-RAY EXAM OF HEEL: CPT | Mod: LT,S$GLB,, | Performed by: INTERNAL MEDICINE

## 2022-03-19 PROCEDURE — 4010F PR ACE/ARB THEARPY RXD/TAKEN: ICD-10-PCS | Mod: CPTII,S$GLB,, | Performed by: PHYSICIAN ASSISTANT

## 2022-03-19 PROCEDURE — 99214 OFFICE O/P EST MOD 30 MIN: CPT | Mod: S$GLB,,, | Performed by: PHYSICIAN ASSISTANT

## 2022-03-19 PROCEDURE — 3079F PR MOST RECENT DIASTOLIC BLOOD PRESSURE 80-89 MM HG: ICD-10-PCS | Mod: CPTII,S$GLB,, | Performed by: PHYSICIAN ASSISTANT

## 2022-03-19 PROCEDURE — 73650 X-RAY EXAM OF HEEL: CPT | Mod: RT,S$GLB,, | Performed by: INTERNAL MEDICINE

## 2022-03-19 PROCEDURE — 99214 PR OFFICE/OUTPT VISIT, EST, LEVL IV, 30-39 MIN: ICD-10-PCS | Mod: S$GLB,,, | Performed by: PHYSICIAN ASSISTANT

## 2022-03-19 NOTE — PROGRESS NOTES
"Subjective:       Patient ID: Codi Wilkerson is a 41 y.o. female.    Vitals:  height is 5' 4" (1.626 m) and weight is 81.6 kg (180 lb). Her tympanic temperature is 96.7 °F (35.9 °C). Her blood pressure is 120/81 and her pulse is 90. Her respiration is 20 and oxygen saturation is 97%.     Chief Complaint: Foot Pain    Pt c/o bilateral heel pain for one week.  She now reports swelling and redness to both heels.  Denies specific mechanism of injury.  Pt states she was concerned that she may have gout as she ate a lot of seafood prior to the start of the pain.  Denies previous h/o gout.  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  She has tried soaking them in epsom salt with no improvement.  Pt has also taken Mobic and ibuprofen.  She noticed some relief after taking ibuprofen but not Mobic.      Constitution: Negative for chills, sweating, fatigue and fever.   Cardiovascular: Negative for chest pain.   Respiratory: Negative for cough and shortness of breath.    Gastrointestinal: Negative for abdominal pain, nausea and vomiting.   Musculoskeletal: Positive for pain, joint pain and joint swelling. Negative for gout.   Skin: Positive for erythema.       Objective:      Physical Exam   Constitutional: She is oriented to person, place, and time. She appears well-developed.   HENT:   Head: Normocephalic and atraumatic. Head is without abrasion, without contusion and without laceration.   Ears:   Right Ear: External ear normal.   Left Ear: External ear normal.   Nose: Nose normal.   Eyes: Conjunctivae, EOM and lids are normal. Pupils are equal, round, and reactive to light.   Neck: Phonation normal. Neck supple.   Cardiovascular: Normal rate.   Pulmonary/Chest: Effort normal. No respiratory distress.   Musculoskeletal: Normal range of motion.         General: Normal range of motion.      Right foot: Bony tenderness and swelling present.      Left foot: Bony tenderness and swelling " present.        Feet:       Comments: Mild swelling and tenderness to bilateral heels; no warmth   Neurological: no focal deficit. She is alert and oriented to person, place, and time. She has intact cranial nerves.      Comments: CN's grossly intact   Skin: Skin is warm, dry, intact and no rash. Capillary refill takes less than 2 seconds. erythema No abrasion, No burn, No bruising and No ecchymosis   Psychiatric: Her speech is normal and behavior is normal. Judgment and thought content normal.   Nursing note and vitals reviewed.    XR CALCANEUS 2 VIEW LEFT    Result Date: 3/19/2022  EXAMINATION: XR CALCANEUS 2 VIEW RIGHT; XR CALCANEUS 2 VIEW LEFT CLINICAL HISTORY: Pain in right foot TECHNIQUE: Tangential and lateral views of the right calcaneus were performed. Tangential and lateral views of the left calcaneus were performed. COMPARISON: CT foot 10/28/2021; ankle radiographs 02/03/2018 FINDINGS: Right calcaneus: No acute fracture or dislocation.  Small plantar calcaneal spur, new from 2018.  Mild degenerative changes of the talonavicular and posterior subtalar joints.  No tibiotalar joint effusion.  No focal soft tissue swelling. Left calcaneus: No acute fracture or dislocation.  Joint spaces are preserved.  Achilles enthesopathy.  No tibiotalar joint effusion.  No focal soft tissue swelling.     Right plantar calcaneal spur. Achilles enthesopathy on the left. Electronically signed by: Isrrael Saini Date:    03/19/2022 Time:    11:42    XR CALCANEUS 2 VIEW RIGHT    Result Date: 3/19/2022  EXAMINATION: XR CALCANEUS 2 VIEW RIGHT; XR CALCANEUS 2 VIEW LEFT CLINICAL HISTORY: Pain in right foot TECHNIQUE: Tangential and lateral views of the right calcaneus were performed. Tangential and lateral views of the left calcaneus were performed. COMPARISON: CT foot 10/28/2021; ankle radiographs 02/03/2018 FINDINGS: Right calcaneus: No acute fracture or dislocation.  Small plantar calcaneal spur, new from 2018.  Mild degenerative  changes of the talonavicular and posterior subtalar joints.  No tibiotalar joint effusion.  No focal soft tissue swelling. Left calcaneus: No acute fracture or dislocation.  Joint spaces are preserved.  Achilles enthesopathy.  No tibiotalar joint effusion.  No focal soft tissue swelling.     Right plantar calcaneal spur. Achilles enthesopathy on the left. Electronically signed by: Isrrael Saini Date:    03/19/2022 Time:    11:42    Results reviewed with pt      Assessment:       1. Heel pain, bilateral          Plan:         Heel pain, bilateral  -     XR CALCANEUS 2 VIEW LEFT; Future; Expected date: 03/19/2022  -     XR CALCANEUS 2 VIEW RIGHT; Future; Expected date: 03/19/2022           Medical Decision Making:   History:   Old Records Summarized: records from clinic visits.  Initial Assessment:   41 y.o. female with PMH DM and osteoarthritis in feet c/o bilateral heel pain for one week.  Pt advised that gout unlikely at this time but should f/u with PCP for uric acid labs.  Pt encouraged to use appropriate shoe support, stretching exercises and ice therapy 3 times a day.  She was advised to not take Mobic and ibuprofen simultaneously due to risk of GI bleeding.    Differential Diagnosis:   Osteoarthritis, plantar fasciitis  Clinical Tests:   Radiological Study: Ordered and Reviewed       Patient Instructions        Heel Pain Caused by Plantar Fasciitis Discharge Instructions   About this topic   Plantar fasciitis is swelling of the thick tissue on the bottom of the foot. This tissue is called the plantar fascia. It connects the heel bone to the bones near the toes and makes the arch of the foot. The pain is often worse:  · When you first step out of bed in the morning  · After a long period of standing or sitting  · When standing on tiptoe  · When climbing stairs  · After hard activity  · When stretching your foot         What care is needed at home?   · Ask your doctor what you need to do when you go home. Make  sure you ask questions if you do not understand what the doctor says. This way you will know what you need to do.  · Take all your drugs as ordered by your doctor.  · Your doctor may tape your foot to support it as it heals.  · Your doctor may suggest you wear a foot splint at night  · Rest your foot as much as possible.  · Place an ice pack or a bag of frozen peas wrapped in a towel on your heel. Never put ice right on the skin. Do not leave the ice on more than 10 to 15 minutes at a time. Do not do this right before stretching as cool muscle does not stretch as well as heated muscle.  · If you have a cast placed over your foot or ankle, ask your doctor about cast care.  · Your doctor may recommend shoe inserts known as orthotics.  What follow-up care is needed?   Your doctor may ask you to make visits to the office to check on your progress. Be sure to keep these visits.  What drugs may be needed?   The doctor may order drugs to:  · Help with pain and swelling  Will physical activity be limited?   You may need to rest your foot for a while. You should not do physical activity that makes your health problem worse. If you run, work out, or play sports, you may not be able to do those things until your health problem gets better.   What problems could happen?   Pain may continue even with treatment  What can be done to prevent this health problem?   · Warm up slowly and stretch before you work out. Stretch to warm up and cool down. This will help you move more easily.  · Wear comfortable, supportive shoes. Avoid wearing high heels and tight shoes. Avoid walking barefoot. You may want to wear shoe inserts or orthotics.  · Avoid activities that cause foot pain, such as standing for long periods of time.  · Limit walking and standing on hard surfaces.  · Keep a healthy weight. Being overweight may put extra stress on your feet.  When do I need to call the doctor?   Pain is worse over time and after a few months of  treatment  Teach Back: Helping You Understand   The Teach Back Method helps you understand the information we are giving you. After you talk with the staff, tell them in your own words what you learned. This helps to make sure the staff has described each thing clearly. It also helps to explain things that may have been confusing. Before going home, make sure you can do these:  · I can tell you about my pain.  · I can tell you what may help ease my pain.  · I can tell you what may help prevent pain in the future.  Last Reviewed Date   2021-07-26  Consumer Information Use and Disclaimer   This information is not specific medical advice and does not replace information you receive from your health care provider. This is only a brief summary of general information. It does NOT include all information about conditions, illnesses, injuries, tests, procedures, treatments, therapies, discharge instructions or life-style choices that may apply to you. You must talk with your health care provider for complete information about your health and treatment options. This information should not be used to decide whether or not to accept your health care providers advice, instructions or recommendations. Only your health care provider has the knowledge and training to provide advice that is right for you.  Copyright   Copyright © 2021 [employer] and its affiliates and/or licensors. All rights reserved.      You must understand that you've received an Urgent Care treatment only and that you may be released before all your medical problems are known or treated. You, the patient, will arrange for follow up care as instructed.    Follow up with your PCP or specialty clinic as directed in the next 1-2 weeks if not improved or as needed. You can call [phone] to schedule an appointment with the appropriate provider.    If your condition worsens we recommend that you receive another evaluation at the emergency room immediately  or contact your primary medical clinic's after hours call service to discuss your concerns.    Please go to the Emergency Department for any concerns or worsening of condition.

## 2022-04-27 DIAGNOSIS — K59.00 CONSTIPATION, UNSPECIFIED CONSTIPATION TYPE: ICD-10-CM

## 2022-06-15 ENCOUNTER — OFFICE VISIT (OUTPATIENT)
Dept: URGENT CARE | Facility: CLINIC | Age: 42
End: 2022-06-15
Payer: COMMERCIAL

## 2022-06-15 VITALS
WEIGHT: 180 LBS | RESPIRATION RATE: 16 BRPM | DIASTOLIC BLOOD PRESSURE: 87 MMHG | HEART RATE: 100 BPM | HEIGHT: 64 IN | OXYGEN SATURATION: 96 % | SYSTOLIC BLOOD PRESSURE: 122 MMHG | BODY MASS INDEX: 30.73 KG/M2 | TEMPERATURE: 98 F

## 2022-06-15 DIAGNOSIS — Z20.822 ENCOUNTER FOR LABORATORY TESTING FOR COVID-19 VIRUS: ICD-10-CM

## 2022-06-15 DIAGNOSIS — K52.9 GASTROENTERITIS: Primary | ICD-10-CM

## 2022-06-15 LAB
CTP QC/QA: YES
SARS-COV-2 RDRP RESP QL NAA+PROBE: NEGATIVE

## 2022-06-15 PROCEDURE — 3079F PR MOST RECENT DIASTOLIC BLOOD PRESSURE 80-89 MM HG: ICD-10-PCS | Mod: CPTII,S$GLB,, | Performed by: PHYSICIAN ASSISTANT

## 2022-06-15 PROCEDURE — 3074F SYST BP LT 130 MM HG: CPT | Mod: CPTII,S$GLB,, | Performed by: PHYSICIAN ASSISTANT

## 2022-06-15 PROCEDURE — 3079F DIAST BP 80-89 MM HG: CPT | Mod: CPTII,S$GLB,, | Performed by: PHYSICIAN ASSISTANT

## 2022-06-15 PROCEDURE — 3008F PR BODY MASS INDEX (BMI) DOCUMENTED: ICD-10-PCS | Mod: CPTII,S$GLB,, | Performed by: PHYSICIAN ASSISTANT

## 2022-06-15 PROCEDURE — 96372 THER/PROPH/DIAG INJ SC/IM: CPT | Mod: S$GLB,,, | Performed by: PHYSICIAN ASSISTANT

## 2022-06-15 PROCEDURE — 1159F MED LIST DOCD IN RCRD: CPT | Mod: CPTII,S$GLB,, | Performed by: PHYSICIAN ASSISTANT

## 2022-06-15 PROCEDURE — 3051F PR MOST RECENT HEMOGLOBIN A1C LEVEL 7.0 - < 8.0%: ICD-10-PCS | Mod: CPTII,S$GLB,, | Performed by: PHYSICIAN ASSISTANT

## 2022-06-15 PROCEDURE — 99213 OFFICE O/P EST LOW 20 MIN: CPT | Mod: 25,S$GLB,, | Performed by: PHYSICIAN ASSISTANT

## 2022-06-15 PROCEDURE — 4010F ACE/ARB THERAPY RXD/TAKEN: CPT | Mod: CPTII,S$GLB,, | Performed by: PHYSICIAN ASSISTANT

## 2022-06-15 PROCEDURE — 1159F PR MEDICATION LIST DOCUMENTED IN MEDICAL RECORD: ICD-10-PCS | Mod: CPTII,S$GLB,, | Performed by: PHYSICIAN ASSISTANT

## 2022-06-15 PROCEDURE — 99213 PR OFFICE/OUTPT VISIT, EST, LEVL III, 20-29 MIN: ICD-10-PCS | Mod: 25,S$GLB,, | Performed by: PHYSICIAN ASSISTANT

## 2022-06-15 PROCEDURE — 4010F PR ACE/ARB THEARPY RXD/TAKEN: ICD-10-PCS | Mod: CPTII,S$GLB,, | Performed by: PHYSICIAN ASSISTANT

## 2022-06-15 PROCEDURE — 3051F HG A1C>EQUAL 7.0%<8.0%: CPT | Mod: CPTII,S$GLB,, | Performed by: PHYSICIAN ASSISTANT

## 2022-06-15 PROCEDURE — U0002: ICD-10-PCS | Mod: QW,S$GLB,, | Performed by: PHYSICIAN ASSISTANT

## 2022-06-15 PROCEDURE — U0002 COVID-19 LAB TEST NON-CDC: HCPCS | Mod: QW,S$GLB,, | Performed by: PHYSICIAN ASSISTANT

## 2022-06-15 PROCEDURE — 3008F BODY MASS INDEX DOCD: CPT | Mod: CPTII,S$GLB,, | Performed by: PHYSICIAN ASSISTANT

## 2022-06-15 PROCEDURE — 3074F PR MOST RECENT SYSTOLIC BLOOD PRESSURE < 130 MM HG: ICD-10-PCS | Mod: CPTII,S$GLB,, | Performed by: PHYSICIAN ASSISTANT

## 2022-06-15 PROCEDURE — 96372 PR INJECTION,THERAP/PROPH/DIAG2ST, IM OR SUBCUT: ICD-10-PCS | Mod: S$GLB,,, | Performed by: PHYSICIAN ASSISTANT

## 2022-06-15 PROCEDURE — 1160F RVW MEDS BY RX/DR IN RCRD: CPT | Mod: CPTII,S$GLB,, | Performed by: PHYSICIAN ASSISTANT

## 2022-06-15 PROCEDURE — 1160F PR REVIEW ALL MEDS BY PRESCRIBER/CLIN PHARMACIST DOCUMENTED: ICD-10-PCS | Mod: CPTII,S$GLB,, | Performed by: PHYSICIAN ASSISTANT

## 2022-06-15 RX ORDER — PROMETHAZINE HYDROCHLORIDE 25 MG/1
25 TABLET ORAL EVERY 6 HOURS PRN
Qty: 15 TABLET | Refills: 0 | Status: SHIPPED | OUTPATIENT
Start: 2022-06-15 | End: 2022-07-15

## 2022-06-15 RX ORDER — PROMETHAZINE HYDROCHLORIDE 25 MG/ML
25 INJECTION, SOLUTION INTRAMUSCULAR; INTRAVENOUS
Status: COMPLETED | OUTPATIENT
Start: 2022-06-15 | End: 2022-06-15

## 2022-06-15 RX ADMIN — PROMETHAZINE HYDROCHLORIDE 25 MG: 25 INJECTION, SOLUTION INTRAMUSCULAR; INTRAVENOUS at 12:06

## 2022-06-15 NOTE — PATIENT INSTRUCTIONS
- Rest.  - Drink plenty of fluids.  - Take Tylenol and/or Ibuprofen as directed as needed for fever/pain.  Do not take more than the recommended dose.  - follow up with your PCP within the next 1-2 weeks as needed.   - Take over the counter Imodium as directed as needed for diarrhea.  - Use over the counter pepcid as directed as needed for reflux symptoms.  - Use over the counter prilosec or nexium as directed for reflux symptoms.   - You must understand that you have received an Urgent Care treatment only and that you may be released before all of your medical problems are known or treated.   - You, the patient, will arrange for follow up care as instructed.   - If your condition worsens or fails to improve we recommend that you receive another evaluation at the ER immediately or contact your PCP to discuss your concerns.   - You can call [phone] or [phone] to help schedule an appointment with the appropriate provider.

## 2022-06-15 NOTE — PROGRESS NOTES
"Subjective:       Patient ID: Codi Wilkerson is a 41 y.o. female.    Vitals:  height is 5' 4" (1.626 m) and weight is 81.6 kg (180 lb). Her tympanic temperature is 97.6 °F (36.4 °C). Her blood pressure is 122/87 and her pulse is 100. Her respiration is 16 and oxygen saturation is 96%.     Chief Complaint: Emesis    Patient stated her abdominal pain and diarrhea started yesterday.  She started vomiting at approximately 4 am this morning.  She has cramps with her abdominal pain.  She stated she has a metallic taste in her mouth.  She tried Zofran early this morning without much relief.  She tried a phenergan tablet at approximately 9 AM, but almost immediately vomited afterwards so is unsure if she had time to absorb any of the medication.  She took some imodium yesterday, but none today.    Emesis   This is a new problem. The current episode started yesterday. The problem occurs 2 to 4 times per day. There has been no fever. Associated symptoms include abdominal pain and diarrhea. Pertinent negatives include no chest pain, chills, coughing, dizziness, fever, headaches or myalgias. Treatments tried: zofran. The treatment provided mild relief.       Constitution: Negative for chills and fever.   HENT: Negative for congestion.    Cardiovascular: Negative for chest pain.   Respiratory: Negative for cough and shortness of breath.    Gastrointestinal: Positive for abdominal pain, nausea, vomiting and diarrhea.   Musculoskeletal: Negative for muscle ache.   Skin: Negative for rash.   Neurological: Negative for dizziness and headaches.       Objective:      Physical Exam   Constitutional: She is oriented to person, place, and time. She appears well-developed. No distress.   HENT:   Head: Normocephalic and atraumatic.   Eyes: Conjunctivae are normal.   Cardiovascular: Normal rate, regular rhythm and normal heart sounds.   No murmur heard.Exam reveals no gallop and no friction rub.   Pulmonary/Chest: Effort normal and breath " sounds normal. No respiratory distress. She has no wheezes.   Abdominal: Bowel sounds are normal. Soft. There is no abdominal tenderness. There is no rebound and no guarding.   Musculoskeletal: Normal range of motion.         General: No tenderness. Normal range of motion.   Neurological: She is alert and oriented to person, place, and time.   Skin: Skin is warm, dry and not diaphoretic.   Psychiatric: Her behavior is normal. Judgment and thought content normal.   Nursing note and vitals reviewed.        Results for orders placed or performed in visit on 06/15/22   POCT COVID-19 Rapid Screening   Result Value Ref Range    POC Rapid COVID Negative Negative     Acceptable Yes        Assessment:       1. Gastroenteritis    2. Encounter for laboratory testing for COVID-19 virus          Plan:         Gastroenteritis  -     promethazine injection 25 mg  -     promethazine (PHENERGAN) 25 MG tablet; Take 1 tablet (25 mg total) by mouth every 6 (six) hours as needed for Nausea.  Dispense: 15 tablet; Refill: 0    Encounter for laboratory testing for COVID-19 virus  -     POCT COVID-19 Rapid Screening           Medical Decision Making:   Urgent Care Management:  Patient presents with a complaint of diarrhea that started yesterday and nausea and vomiting that started in the early hours this morning.  She tried Zofran when it 1st started without significant relief.  She did try taking a Phenergan by mouth but immediately threw up so is not sure how much of the medication was absorbed.  She continues with some diarrhea as well.  She denies fever, chills, or body aches.  She has no sick contacts.  On exam her vital signs are stable.  She has no abdominal tenderness.  Her COVID test was negative.  I believe this patient has gastroenteritis and will treat as such.  I have advised on the use of Imodium.  She was given an IM injection of Phenergan in clinic and sent home with a prescription for Phenergan tablets.  I  did offer to give her suppositories but she declined.  ER precautions given patient voiced her understanding and is in agreement with this plan.       Patient Instructions   - Rest.  - Drink plenty of fluids.  - Take Tylenol and/or Ibuprofen as directed as needed for fever/pain.  Do not take more than the recommended dose.  - follow up with your PCP within the next 1-2 weeks as needed.   - Take over the counter Imodium as directed as needed for diarrhea.  - Use over the counter pepcid as directed as needed for reflux symptoms.  - Use over the counter prilosec or nexium as directed for reflux symptoms.   - You must understand that you have received an Urgent Care treatment only and that you may be released before all of your medical problems are known or treated.   - You, the patient, will arrange for follow up care as instructed.   - If your condition worsens or fails to improve we recommend that you receive another evaluation at the ER immediately or contact your PCP to discuss your concerns.   - You can call [phone] or [phone] to help schedule an appointment with the appropriate provider.

## 2022-06-15 NOTE — LETTER
Tyesha 15, 2022      [facility] - [facility]  [address], [address]  [place] [address]  Phone: [phone]  Fax: [phone]       Patient: Codi Wilkerson   YOB: 1980  Date of Visit: 06/15/2022    To Whom It May Concern:    Elke Wilkerson  was at [facility] on 06/15/2022. The patient may return to work/school on 06/17/2022 with no restrictions. If you have any questions or concerns, or if I can be of further assistance, please do not hesitate to contact me.    Sincerely,          Carolyn Evans PA-C

## 2022-07-08 ENCOUNTER — HOSPITAL ENCOUNTER (OUTPATIENT)
Dept: RADIOLOGY | Facility: HOSPITAL | Age: 42
Discharge: HOME | End: 2022-07-08
Attending: PODIATRIST
Payer: COMMERCIAL

## 2022-07-08 ENCOUNTER — OFFICE VISIT (OUTPATIENT)
Dept: PODIATRY | Facility: CLINIC | Age: 42
End: 2022-07-08
Payer: COMMERCIAL

## 2022-07-08 VITALS
DIASTOLIC BLOOD PRESSURE: 85 MMHG | HEIGHT: 64 IN | SYSTOLIC BLOOD PRESSURE: 111 MMHG | BODY MASS INDEX: 30.73 KG/M2 | WEIGHT: 180 LBS | HEART RATE: 91 BPM

## 2022-07-08 DIAGNOSIS — M79.671 FOOT PAIN, RIGHT: ICD-10-CM

## 2022-07-08 DIAGNOSIS — M19.079 ARTHRITIS OF FOOT: ICD-10-CM

## 2022-07-08 DIAGNOSIS — M19.079 ARTHRITIS OF FOOT: Primary | ICD-10-CM

## 2022-07-08 PROCEDURE — 73610 XR ANKLE COMPLETE 3 VIEW RIGHT: ICD-10-PCS | Mod: 26,RT,, | Performed by: RADIOLOGY

## 2022-07-08 PROCEDURE — 3008F BODY MASS INDEX DOCD: CPT | Mod: CPTII,S$GLB,, | Performed by: PODIATRIST

## 2022-07-08 PROCEDURE — 3051F HG A1C>EQUAL 7.0%<8.0%: CPT | Mod: CPTII,S$GLB,, | Performed by: PODIATRIST

## 2022-07-08 PROCEDURE — 3074F SYST BP LT 130 MM HG: CPT | Mod: CPTII,S$GLB,, | Performed by: PODIATRIST

## 2022-07-08 PROCEDURE — 4010F PR ACE/ARB THEARPY RXD/TAKEN: ICD-10-PCS | Mod: CPTII,S$GLB,, | Performed by: PODIATRIST

## 2022-07-08 PROCEDURE — 3008F PR BODY MASS INDEX (BMI) DOCUMENTED: ICD-10-PCS | Mod: CPTII,S$GLB,, | Performed by: PODIATRIST

## 2022-07-08 PROCEDURE — 73630 X-RAY EXAM OF FOOT: CPT | Mod: TC,FY,RT

## 2022-07-08 PROCEDURE — 4010F ACE/ARB THERAPY RXD/TAKEN: CPT | Mod: CPTII,S$GLB,, | Performed by: PODIATRIST

## 2022-07-08 PROCEDURE — 73610 X-RAY EXAM OF ANKLE: CPT | Mod: 26,RT,, | Performed by: RADIOLOGY

## 2022-07-08 PROCEDURE — 3079F PR MOST RECENT DIASTOLIC BLOOD PRESSURE 80-89 MM HG: ICD-10-PCS | Mod: CPTII,S$GLB,, | Performed by: PODIATRIST

## 2022-07-08 PROCEDURE — 3079F DIAST BP 80-89 MM HG: CPT | Mod: CPTII,S$GLB,, | Performed by: PODIATRIST

## 2022-07-08 PROCEDURE — 99999 PR PBB SHADOW E&M-EST. PATIENT-LVL V: ICD-10-PCS | Mod: PBBFAC,,, | Performed by: PODIATRIST

## 2022-07-08 PROCEDURE — 3051F PR MOST RECENT HEMOGLOBIN A1C LEVEL 7.0 - < 8.0%: ICD-10-PCS | Mod: CPTII,S$GLB,, | Performed by: PODIATRIST

## 2022-07-08 PROCEDURE — 73610 X-RAY EXAM OF ANKLE: CPT | Mod: TC,FY,RT

## 2022-07-08 PROCEDURE — 1159F MED LIST DOCD IN RCRD: CPT | Mod: CPTII,S$GLB,, | Performed by: PODIATRIST

## 2022-07-08 PROCEDURE — 99999 PR PBB SHADOW E&M-EST. PATIENT-LVL V: CPT | Mod: PBBFAC,,, | Performed by: PODIATRIST

## 2022-07-08 PROCEDURE — 73630 XR FOOT COMPLETE 3 VIEW RIGHT: ICD-10-PCS | Mod: 26,RT,, | Performed by: RADIOLOGY

## 2022-07-08 PROCEDURE — 1160F PR REVIEW ALL MEDS BY PRESCRIBER/CLIN PHARMACIST DOCUMENTED: ICD-10-PCS | Mod: CPTII,S$GLB,, | Performed by: PODIATRIST

## 2022-07-08 PROCEDURE — 3074F PR MOST RECENT SYSTOLIC BLOOD PRESSURE < 130 MM HG: ICD-10-PCS | Mod: CPTII,S$GLB,, | Performed by: PODIATRIST

## 2022-07-08 PROCEDURE — 1160F RVW MEDS BY RX/DR IN RCRD: CPT | Mod: CPTII,S$GLB,, | Performed by: PODIATRIST

## 2022-07-08 PROCEDURE — 99204 PR OFFICE/OUTPT VISIT, NEW, LEVL IV, 45-59 MIN: ICD-10-PCS | Mod: S$GLB,,, | Performed by: PODIATRIST

## 2022-07-08 PROCEDURE — 99204 OFFICE O/P NEW MOD 45 MIN: CPT | Mod: S$GLB,,, | Performed by: PODIATRIST

## 2022-07-08 PROCEDURE — 73630 X-RAY EXAM OF FOOT: CPT | Mod: 26,RT,, | Performed by: RADIOLOGY

## 2022-07-08 PROCEDURE — 1159F PR MEDICATION LIST DOCUMENTED IN MEDICAL RECORD: ICD-10-PCS | Mod: CPTII,S$GLB,, | Performed by: PODIATRIST

## 2022-07-08 RX ORDER — MELOXICAM 15 MG/1
15 TABLET ORAL DAILY
Qty: 30 TABLET | Refills: 0 | Status: SHIPPED | OUTPATIENT
Start: 2022-07-08 | End: 2023-05-02

## 2022-07-08 NOTE — PROGRESS NOTES
Subjective:      Patient ID: Codi Wilkerson is a 41 y.o. female.    Chief Complaint: Ankle Pain (R ankle)    Deep aching throbbing pain right hindfoot.  Gradual onset, worsening over past several years, aggravated by increased weight bearing, shoe gear, pressure.  Prior injection and oral steroids helped temporarily.  OTC pain med not helping. Denies trauma.    CT foot 10/21 negative coalition, shows arthritis.  Heel xrays 03/22 poorly visualized facets stj right more than left.  No acute injury.    Review of Systems   Constitutional: Negative for chills, diaphoresis, fever, malaise/fatigue and night sweats.   Cardiovascular: Negative for claudication, cyanosis, leg swelling and syncope.   Skin: Negative for color change, dry skin, nail changes, rash, suspicious lesions and unusual hair distribution.   Musculoskeletal: Positive for joint pain and joint swelling. Negative for falls, muscle cramps, muscle weakness and stiffness.   Gastrointestinal: Negative for constipation, diarrhea, nausea and vomiting.   Neurological: Negative for brief paralysis, disturbances in coordination, focal weakness, numbness, paresthesias, sensory change and tremors.           Objective:      Physical Exam  Constitutional:       General: She is not in acute distress.     Appearance: She is well-developed. She is not diaphoretic.   Cardiovascular:      Pulses:           Popliteal pulses are 2+ on the right side and 2+ on the left side.        Dorsalis pedis pulses are 2+ on the right side and 2+ on the left side.        Posterior tibial pulses are 2+ on the right side and 2+ on the left side.      Comments: Capillary refill 3 seconds all toes/distal feet, all toes/both feet warm to touch.      Negative lymphadenopathy bilateral popliteal fossa and tarsal tunnel.      Negavie lower extremity edema bilateral.    Musculoskeletal:      Right ankle: No swelling, deformity, ecchymosis or lacerations. Normal range of motion. Normal pulse.       Right Achilles Tendon: Normal. No defects. Aponte's test negative.      Comments: Essentially no motion present at stj, mtj right with increased girth of rearfootcomplex without loss of function ,signs acue trauma, or gross deformity.    Otherwise,. Normal angle, base, station of gait. All ten toes without clubbing, cyanosis, or signs of ischemia.  No pain to palpation bilateral lower extremities.  Range of motion, stability, muscle strength, and muscle tone normal bilateral feet and legs.    Lymphadenopathy:      Lower Body: No right inguinal adenopathy. No left inguinal adenopathy.      Comments: Negative lymphadenopathy bilateral popliteal fossa and tarsal tunnel.    Negative lymphangitic streaking bilateral feet/ankles/legs.   Skin:     General: Skin is warm and dry.      Capillary Refill: Capillary refill takes 2 to 3 seconds.      Coloration: Skin is not pale.      Findings: No abrasion, bruising, burn, ecchymosis, erythema, laceration, lesion or rash.      Nails: There is no clubbing.      Comments:   Skin is normal age and health appropriate color, turgor, texture, and temperature bilateral lower extremities without ulceration, hyperpigmentation, discoloration, masses nodules or cords palpated.  No ecchymosis, erythema, edema, or cardinal signs of infection bilateral lower extremities.     Neurological:      Mental Status: She is alert and oriented to person, place, and time.      Sensory: No sensory deficit.      Motor: No tremor, atrophy or abnormal muscle tone.      Gait: Gait normal.      Deep Tendon Reflexes:      Reflex Scores:       Patellar reflexes are 2+ on the right side and 2+ on the left side.       Achilles reflexes are 2+ on the right side and 2+ on the left side.     Comments: Negative tinel sign to percussion sural, superficial peroneal, deep peroneal, saphenous, and posterior tibial nerves right and left ankles and feet.      Negative allodynia.   Psychiatric:         Behavior: Behavior  is cooperative.               Assessment:       Encounter Diagnoses   Name Primary?    Arthritis of foot Yes    Foot pain, right          Plan:       Codi was seen today for ankle pain.    Diagnoses and all orders for this visit:    Arthritis of foot  -     Ambulatory referral/consult to Physical/Occupational Therapy; Future  -     ORTHOTIC DEVICE (DME)  -     X-Ray Foot Complete Right; Future  -     X-Ray Ankle Complete Right; Future    Foot pain, right  -     Ambulatory referral/consult to Physical/Occupational Therapy; Future  -     ORTHOTIC DEVICE (DME)  -     X-Ray Foot Complete Right; Future  -     X-Ray Ankle Complete Right; Future    Other orders  -     meloxicam (MOBIC) 15 MG tablet; Take 1 tablet (15 mg total) by mouth once daily.      I counseled the patient on her conditions, their implications and medical management.        Patient will stretch the tendo achilles complex three times daily as demonstrated in the office.  Literature was dispensed illustrating proper stretching technique.    I applied a plantar rest strapping to the patient's foot to offload symptomatic area, support the arch, and relieve pain.    Patient will obtain over the counter arch supports and wear them in shoes whenever possible.  Athletic shoes intended for walking or running are usually best.    The patient was advised that NSAID-type medications have two very important potential side effects: gastrointestinal irritation including hemorrhage and renal injuries. She was asked to take the medication with food and to stop if she experiences any GI upset. I asked her to call for vomiting, abdominal pain or black/bloody stools. The patient expresses understanding of these issues and questions were answered.    Discussed conservative treatment with shoes of adequate dimensions, material, and style to alleviate symptoms and delay or prevent surgical intervention.    Meloxicam, pt, 4 weeks.    Inj/mri pending improvement.           No follow-ups on file.

## 2022-07-12 ENCOUNTER — OFFICE VISIT (OUTPATIENT)
Dept: INTERNAL MEDICINE | Facility: CLINIC | Age: 42
End: 2022-07-12
Payer: COMMERCIAL

## 2022-07-12 VITALS
DIASTOLIC BLOOD PRESSURE: 78 MMHG | SYSTOLIC BLOOD PRESSURE: 114 MMHG | HEIGHT: 64 IN | OXYGEN SATURATION: 98 % | WEIGHT: 177.5 LBS | BODY MASS INDEX: 30.3 KG/M2 | HEART RATE: 92 BPM

## 2022-07-12 DIAGNOSIS — E55.9 VITAMIN D DEFICIENCY: ICD-10-CM

## 2022-07-12 DIAGNOSIS — E11.9 TYPE 2 DIABETES MELLITUS WITHOUT COMPLICATION, WITHOUT LONG-TERM CURRENT USE OF INSULIN: Primary | ICD-10-CM

## 2022-07-12 DIAGNOSIS — F32.A ANXIETY AND DEPRESSION: ICD-10-CM

## 2022-07-12 DIAGNOSIS — F41.9 ANXIETY AND DEPRESSION: ICD-10-CM

## 2022-07-12 DIAGNOSIS — Z71.89 COUNSELING AND COORDINATION OF CARE: ICD-10-CM

## 2022-07-12 DIAGNOSIS — Z12.31 ENCOUNTER FOR SCREENING MAMMOGRAM FOR MALIGNANT NEOPLASM OF BREAST: ICD-10-CM

## 2022-07-12 DIAGNOSIS — E78.2 MIXED HYPERLIPIDEMIA: ICD-10-CM

## 2022-07-12 PROCEDURE — 3008F PR BODY MASS INDEX (BMI) DOCUMENTED: ICD-10-PCS | Mod: CPTII,S$GLB,, | Performed by: NURSE PRACTITIONER

## 2022-07-12 PROCEDURE — 99214 OFFICE O/P EST MOD 30 MIN: CPT | Mod: S$GLB,,, | Performed by: NURSE PRACTITIONER

## 2022-07-12 PROCEDURE — 3051F PR MOST RECENT HEMOGLOBIN A1C LEVEL 7.0 - < 8.0%: ICD-10-PCS | Mod: CPTII,S$GLB,, | Performed by: NURSE PRACTITIONER

## 2022-07-12 PROCEDURE — 1160F RVW MEDS BY RX/DR IN RCRD: CPT | Mod: CPTII,S$GLB,, | Performed by: NURSE PRACTITIONER

## 2022-07-12 PROCEDURE — 3061F NEG MICROALBUMINURIA REV: CPT | Mod: CPTII,S$GLB,, | Performed by: NURSE PRACTITIONER

## 2022-07-12 PROCEDURE — 3051F HG A1C>EQUAL 7.0%<8.0%: CPT | Mod: CPTII,S$GLB,, | Performed by: NURSE PRACTITIONER

## 2022-07-12 PROCEDURE — 3074F PR MOST RECENT SYSTOLIC BLOOD PRESSURE < 130 MM HG: ICD-10-PCS | Mod: CPTII,S$GLB,, | Performed by: NURSE PRACTITIONER

## 2022-07-12 PROCEDURE — 1159F MED LIST DOCD IN RCRD: CPT | Mod: CPTII,S$GLB,, | Performed by: NURSE PRACTITIONER

## 2022-07-12 PROCEDURE — 99214 PR OFFICE/OUTPT VISIT, EST, LEVL IV, 30-39 MIN: ICD-10-PCS | Mod: S$GLB,,, | Performed by: NURSE PRACTITIONER

## 2022-07-12 PROCEDURE — 1159F PR MEDICATION LIST DOCUMENTED IN MEDICAL RECORD: ICD-10-PCS | Mod: CPTII,S$GLB,, | Performed by: NURSE PRACTITIONER

## 2022-07-12 PROCEDURE — 3008F BODY MASS INDEX DOCD: CPT | Mod: CPTII,S$GLB,, | Performed by: NURSE PRACTITIONER

## 2022-07-12 PROCEDURE — 3061F PR NEG MICROALBUMINURIA RESULT DOCUMENTED/REVIEW: ICD-10-PCS | Mod: CPTII,S$GLB,, | Performed by: NURSE PRACTITIONER

## 2022-07-12 PROCEDURE — 99999 PR PBB SHADOW E&M-EST. PATIENT-LVL V: ICD-10-PCS | Mod: PBBFAC,,, | Performed by: NURSE PRACTITIONER

## 2022-07-12 PROCEDURE — 3078F DIAST BP <80 MM HG: CPT | Mod: CPTII,S$GLB,, | Performed by: NURSE PRACTITIONER

## 2022-07-12 PROCEDURE — 3066F NEPHROPATHY DOC TX: CPT | Mod: CPTII,S$GLB,, | Performed by: NURSE PRACTITIONER

## 2022-07-12 PROCEDURE — 3078F PR MOST RECENT DIASTOLIC BLOOD PRESSURE < 80 MM HG: ICD-10-PCS | Mod: CPTII,S$GLB,, | Performed by: NURSE PRACTITIONER

## 2022-07-12 PROCEDURE — 4010F ACE/ARB THERAPY RXD/TAKEN: CPT | Mod: CPTII,S$GLB,, | Performed by: NURSE PRACTITIONER

## 2022-07-12 PROCEDURE — 1160F PR REVIEW ALL MEDS BY PRESCRIBER/CLIN PHARMACIST DOCUMENTED: ICD-10-PCS | Mod: CPTII,S$GLB,, | Performed by: NURSE PRACTITIONER

## 2022-07-12 PROCEDURE — 99999 PR PBB SHADOW E&M-EST. PATIENT-LVL V: CPT | Mod: PBBFAC,,, | Performed by: NURSE PRACTITIONER

## 2022-07-12 PROCEDURE — 3074F SYST BP LT 130 MM HG: CPT | Mod: CPTII,S$GLB,, | Performed by: NURSE PRACTITIONER

## 2022-07-12 PROCEDURE — 3066F PR DOCUMENTATION OF TREATMENT FOR NEPHROPATHY: ICD-10-PCS | Mod: CPTII,S$GLB,, | Performed by: NURSE PRACTITIONER

## 2022-07-12 PROCEDURE — 4010F PR ACE/ARB THEARPY RXD/TAKEN: ICD-10-PCS | Mod: CPTII,S$GLB,, | Performed by: NURSE PRACTITIONER

## 2022-07-12 RX ORDER — DULAGLUTIDE 3 MG/.5ML
3 INJECTION, SOLUTION SUBCUTANEOUS
Qty: 4 PEN | Refills: 7 | Status: SHIPPED | OUTPATIENT
Start: 2022-08-01 | End: 2023-05-09 | Stop reason: ALTCHOICE

## 2022-07-12 RX ORDER — EMPAGLIFLOZIN 10 MG/1
10 TABLET, FILM COATED ORAL DAILY
Qty: 90 TABLET | Refills: 2 | Status: SHIPPED | OUTPATIENT
Start: 2022-08-01 | End: 2023-05-02 | Stop reason: SDUPTHER

## 2022-07-12 RX ORDER — ERGOCALCIFEROL 1.25 MG/1
50000 CAPSULE ORAL
Qty: 12 CAPSULE | Refills: 3 | Status: SHIPPED | OUTPATIENT
Start: 2022-07-12 | End: 2023-05-02 | Stop reason: SDUPTHER

## 2022-07-12 NOTE — PATIENT INSTRUCTIONS
Follow up in 6 months w/Irielle   A1c bmp tsh, vit d in 6 months (prior)     Lab Results   Component Value Date    HGBA1C 7.1 (H) 07/08/2022     Goal less than 7%     Goal  no higher than 180     Trulicity 3 mg weekly  Jardiance 10 mg daily     2000 mg twice a day of fish oil   Continue fenofibrate     [email or web address]  Eat fit mc  Myfitnesspal mc  [email or web address]

## 2022-07-12 NOTE — Clinical Note
A1c 7.1% Pt has concerns with cushing's disease-Dr. Ogden- I'm not sure if a work up is needed Placed in ROS what was +, etc.

## 2022-07-12 NOTE — PROGRESS NOTES
CHIEF COMPLAINT: Type 2 Diabetes     HPI: Mrs. Codi Wilkerson is a 41 y.o. female who was diagnosed with Type 2 DM x 6 years ago.  Last visit done via virtual fall 2020.  Past Medical History:   Diagnosis Date    Anxiety and depression     psychiatrist Dr. Casanova    Bilateral ovarian cysts 5/30/2017    Diabetes mellitus 2014    type 2    Elevated CEA 5/12/2017    Female pelvic pain 5/30/2017    GERD (gastroesophageal reflux disease)     HLD (hyperlipidemia)     Lesion of right lung 7/5/2017    Migraine headache     Right ovarian cyst 5/12/2017   a1c trends 6.9% to 7%  To 7.3% to 7.1%  Concerns w/ cushings disease  Last seen by me in 3/2022   Lab Results   Component Value Date    HGBA1C 7.1 (H) 07/08/2022     Seen by podiatry Dr. Edwards.  Not checking sugar levels often    Dietary habits:  Drinking a lot of fluids: water, gatorade ,1 coke a day , tea     PREVIOUS DIABETES MEDICATIONS TRIED  Metformin-GI/sick  ER and plain -sick  jardiance  trulicity     CURRENT DIABETIC MEDS: trulicity 3 mg qweek, jardiance 10 mg daily     Diabetes Management Status    Statin: Not taking  ACE/ARB: Taking    Screening or Prevention Patient's value Goal Complete/Controlled?   HgA1C Testing and Control   Lab Results   Component Value Date    HGBA1C 7.1 (H) 07/08/2022      Annually/Less than 8% Yes   Lipid profile : 07/08/2022 Annually Yes   LDL control Lab Results   Component Value Date    LDLCALC 61.6 (L) 07/08/2022    Annually/Less than 100 mg/dl  Yes   Nephropathy screening Lab Results   Component Value Date    LABMICR 19.0 07/09/2022     Lab Results   Component Value Date    PROTEINUA 30 (A) 02/12/2013    Annually Yes   Blood pressure BP Readings from Last 1 Encounters:   07/08/22 111/85    Less than 140/90 Yes   Dilated retinal exam : 04/12/2021 Annually Yes   Foot exam   : 03/11/2022 Annually Yes     REVIEW OF SYSTEMS  General: no weakness, fatigue, weight  3# loss from oct 2021   Eyes: no double or blurred  "vision, eye pain, or redness  Cardiovascular: no chest pain, palpitations, edema, or murmurs.   Respiratory: no cough or dyspnea.   GI: no heartburn, nausea, + changes in bowel patterns-constipation; good appetite.   Skin: no rashes, dryness, itching, or reactions at insulin injection sites.   Neuro: + numbness, tingling-feet/foot inserts- f/u with podiatry, tremors, or vertigo.   Endocrine: no polyuria, polydipsia, polyphagia, + heat or cold intolerance.   +lipodystrophy, smaller limbs, hair loss  Psych: +mood disorder    Vital Signs  /78 (BP Location: Left arm, Patient Position: Sitting, BP Method: Medium (Manual))   Pulse 92   Ht 5' 4" (1.626 m)   Wt 80.5 kg (177 lb 7.5 oz)   LMP 02/12/2009   SpO2 98%   BMI 30.46 kg/m²     Hemoglobin A1C   Date Value Ref Range Status   07/08/2022 7.1 (H) 4.0 - 5.6 % Final     Comment:     ADA Screening Guidelines:  5.7-6.4%  Consistent with prediabetes  >or=6.5%  Consistent with diabetes    High levels of fetal hemoglobin interfere with the HbA1C  assay. Heterozygous hemoglobin variants (HbS, HgC, etc)do  not significantly interfere with this assay.   However, presence of multiple variants may affect accuracy.     03/10/2022 7.3 (H) 4.0 - 5.6 % Final     Comment:     ADA Screening Guidelines:  5.7-6.4%  Consistent with prediabetes  >or=6.5%  Consistent with diabetes    High levels of fetal hemoglobin interfere with the HbA1C  assay. Heterozygous hemoglobin variants (HbS, HgC, etc)do  not significantly interfere with this assay.   However, presence of multiple variants may affect accuracy.     05/04/2021 7.0 (H) 4.0 - 5.6 % Final     Comment:     ADA Screening Guidelines:  5.7-6.4%  Consistent with prediabetes  >or=6.5%  Consistent with diabetes    High levels of fetal hemoglobin interfere with the HbA1C  assay. Heterozygous hemoglobin variants (HbS, HgC, etc)do  not significantly interfere with this assay.   However, presence of multiple variants may affect " accuracy.          Chemistry        Component Value Date/Time     05/01/2020 0725    K 4.1 05/01/2020 0725     05/01/2020 0725    CO2 26 05/01/2020 0725    BUN 12 05/01/2020 0725    CREATININE 0.8 05/01/2020 0725     (H) 05/01/2020 0725        Component Value Date/Time    CALCIUM 9.4 05/01/2020 0725    ALKPHOS 61 05/01/2020 0725    AST 33 05/01/2020 0725    ALT 53 (H) 05/01/2020 0725    BILITOT 0.4 05/01/2020 0725    ESTGFRAFRICA >60.0 05/01/2020 0725    EGFRNONAA >60.0 05/01/2020 0725           Lab Results   Component Value Date    TSH 1.811 02/04/2020      Lab Results   Component Value Date    CHOL 134 07/08/2022    CHOL 134 05/01/2020    CHOL 158 02/04/2020     Lab Results   Component Value Date    HDL 21 (L) 07/08/2022    HDL 22 (L) 05/01/2020    HDL 19 (L) 02/04/2020     Lab Results   Component Value Date    LDLCALC 61.6 (L) 07/08/2022    LDLCALC 57.8 (L) 05/01/2020    LDLCALC Invalid, Trig>400.0 02/04/2020     Lab Results   Component Value Date    TRIG 257 (H) 07/08/2022    TRIG 271 (H) 05/01/2020    TRIG 794 (H) 02/04/2020     Lab Results   Component Value Date    CHOLHDL 15.7 (L) 07/08/2022    CHOLHDL 16.4 (L) 05/01/2020    CHOLHDL 12.0 (L) 02/04/2020         PHYSICAL EXAMINATION  Constitutional: Appears well, no distress Reviewed vitals above.  Eyes: conjunctivae & lids intact; PERRLA, EOMs intact; optic discs   Neck: Supple, trachea midline.   Respiratory: No wheezes, even and unlabored  Cardiovascular: deferred  Lymph: deferred   Skin: warm and dry; no injection site reactions, no acanthosis nigracans observed.  Neuro:patient alert and cooperative, normal affect; steady gait.  Psychiatric: judgement & insight intact, orientation of time, place & person intact, memory; mood & affect wnl     Diabetes Foot Exam:   Deferred       Assessment/Plan    1. Type 2 diabetes mellitus without complication, without long-term current use of insulin  Hemoglobin A1C    Basic Metabolic Panel    TSH     Maturity Onset Diabetes of the Young ([facility])   2. Mixed hyperlipidemia     3. Anxiety and depression     4. Counseling and coordination of care     5. Encounter for screening mammogram for malignant neoplasm of breast  Mammo Digital Screening Bilat   6. Vitamin D deficiency  Vitamin D     1. Follow up in 6 months   a1c bmp tsh, vit d, [facility]-per endo prior   a1c goal less than 7%  Continue regimen at this time   Discussed dietary habits, stressors  Wt loss 4# since last visit  2.   Lab Results   Component Value Date    LDLCALC 61.6 (L) 07/08/2022     Trig-- on fenofibrate  Recommend fish oil 2 gm bid  3. F/u with psych   4. See above  Reach out to Dr. Ogden   5. Mammogram 2022    6. Ergo sent  Last 2020, level 15    FOLLOW UP  In 6 months

## 2022-07-13 ENCOUNTER — TELEPHONE (OUTPATIENT)
Dept: INTERNAL MEDICINE | Facility: CLINIC | Age: 42
End: 2022-07-13
Payer: COMMERCIAL

## 2022-07-13 NOTE — TELEPHONE ENCOUNTER
----- Message from HAYDEN Brand, JOSEP sent at 7/12/2022  5:10 PM CDT -----  Regarding: [facility] lab  Please add [facility] lab to next set of labs  Per endocrinology recs  Thanks  IB  Order is in

## 2022-08-08 ENCOUNTER — OFFICE VISIT (OUTPATIENT)
Dept: PODIATRY | Facility: CLINIC | Age: 42
End: 2022-08-08
Payer: COMMERCIAL

## 2022-08-08 VITALS — WEIGHT: 177 LBS | BODY MASS INDEX: 30.22 KG/M2 | HEIGHT: 64 IN

## 2022-08-08 DIAGNOSIS — M79.671 FOOT PAIN, RIGHT: ICD-10-CM

## 2022-08-08 DIAGNOSIS — M19.079 ARTHRITIS OF FOOT: Primary | ICD-10-CM

## 2022-08-08 PROCEDURE — 99999 PR PBB SHADOW E&M-EST. PATIENT-LVL III: CPT | Mod: PBBFAC,,, | Performed by: PODIATRIST

## 2022-08-08 PROCEDURE — 1160F PR REVIEW ALL MEDS BY PRESCRIBER/CLIN PHARMACIST DOCUMENTED: ICD-10-PCS | Mod: CPTII,S$GLB,, | Performed by: PODIATRIST

## 2022-08-08 PROCEDURE — 3061F PR NEG MICROALBUMINURIA RESULT DOCUMENTED/REVIEW: ICD-10-PCS | Mod: CPTII,S$GLB,, | Performed by: PODIATRIST

## 2022-08-08 PROCEDURE — 1160F RVW MEDS BY RX/DR IN RCRD: CPT | Mod: CPTII,S$GLB,, | Performed by: PODIATRIST

## 2022-08-08 PROCEDURE — 3066F PR DOCUMENTATION OF TREATMENT FOR NEPHROPATHY: ICD-10-PCS | Mod: CPTII,S$GLB,, | Performed by: PODIATRIST

## 2022-08-08 PROCEDURE — 3008F BODY MASS INDEX DOCD: CPT | Mod: CPTII,S$GLB,, | Performed by: PODIATRIST

## 2022-08-08 PROCEDURE — 1159F MED LIST DOCD IN RCRD: CPT | Mod: CPTII,S$GLB,, | Performed by: PODIATRIST

## 2022-08-08 PROCEDURE — 3066F NEPHROPATHY DOC TX: CPT | Mod: CPTII,S$GLB,, | Performed by: PODIATRIST

## 2022-08-08 PROCEDURE — 4010F PR ACE/ARB THEARPY RXD/TAKEN: ICD-10-PCS | Mod: CPTII,S$GLB,, | Performed by: PODIATRIST

## 2022-08-08 PROCEDURE — 99499 NO LOS: ICD-10-PCS | Mod: S$GLB,,, | Performed by: PODIATRIST

## 2022-08-08 PROCEDURE — 3008F PR BODY MASS INDEX (BMI) DOCUMENTED: ICD-10-PCS | Mod: CPTII,S$GLB,, | Performed by: PODIATRIST

## 2022-08-08 PROCEDURE — 99999 PR PBB SHADOW E&M-EST. PATIENT-LVL III: ICD-10-PCS | Mod: PBBFAC,,, | Performed by: PODIATRIST

## 2022-08-08 PROCEDURE — 1159F PR MEDICATION LIST DOCUMENTED IN MEDICAL RECORD: ICD-10-PCS | Mod: CPTII,S$GLB,, | Performed by: PODIATRIST

## 2022-08-08 PROCEDURE — 3051F PR MOST RECENT HEMOGLOBIN A1C LEVEL 7.0 - < 8.0%: ICD-10-PCS | Mod: CPTII,S$GLB,, | Performed by: PODIATRIST

## 2022-08-08 PROCEDURE — 3061F NEG MICROALBUMINURIA REV: CPT | Mod: CPTII,S$GLB,, | Performed by: PODIATRIST

## 2022-08-08 PROCEDURE — 99499 UNLISTED E&M SERVICE: CPT | Mod: S$GLB,,, | Performed by: PODIATRIST

## 2022-08-08 PROCEDURE — 3051F HG A1C>EQUAL 7.0%<8.0%: CPT | Mod: CPTII,S$GLB,, | Performed by: PODIATRIST

## 2022-08-08 PROCEDURE — 4010F ACE/ARB THERAPY RXD/TAKEN: CPT | Mod: CPTII,S$GLB,, | Performed by: PODIATRIST

## 2022-08-08 NOTE — PROGRESS NOTES
Subjective:      Patient ID: Codi Wilkerson is a 41 y.o. female.    Chief Complaint: Foot Problem (Right foot), Foot Pain, and Follow-up    Deep aching, sometimts sharp pain midfoot right.  Gradual onset, worsening over past several years, aggravated by increased weight bearing, shoe gear, pressure. Stretches, custom orthtoics, atheltic shoes, nsaids help some.  Not currently stretching at home.  Awaiting PT appointment..  Denies trauma.    Review of Systems   Constitutional: Negative for chills, diaphoresis, fever, malaise/fatigue and night sweats.   Cardiovascular: Negative for claudication, cyanosis, leg swelling and syncope.   Skin: Negative for color change, dry skin, nail changes, rash, suspicious lesions and unusual hair distribution.   Musculoskeletal: Positive for joint pain and joint swelling. Negative for falls, muscle cramps, muscle weakness and stiffness.   Gastrointestinal: Negative for constipation, diarrhea, nausea and vomiting.   Neurological: Negative for brief paralysis, disturbances in coordination, focal weakness, numbness, paresthesias, sensory change and tremors.           Objective:      Physical Exam  Constitutional:       General: She is not in acute distress.     Appearance: She is well-developed. She is not diaphoretic.   Cardiovascular:      Pulses:           Popliteal pulses are 2+ on the right side and 2+ on the left side.        Dorsalis pedis pulses are 2+ on the right side and 2+ on the left side.        Posterior tibial pulses are 2+ on the right side and 2+ on the left side.      Comments: Capillary refill 3 seconds all toes/distal feet, all toes/both feet warm to touch.      Negative lymphadenopathy bilateral popliteal fossa and tarsal tunnel.      Negavie lower extremity edema bilateral.    Musculoskeletal:      Right ankle: No swelling, deformity, ecchymosis or lacerations. Normal range of motion. Normal pulse.      Right Achilles Tendon: Normal. No defects. Aponte's test  negative.      Comments: Essentially no motion present at stj, mtj right with increased girth of rearfootcomplex without loss of function ,signs acue trauma, or gross deformity.    Generalized midfoot pain right without focal point of pain to palpation, range of motion.    Otherwise,. Normal angle, base, station of gait. All ten toes without clubbing, cyanosis, or signs of ischemia.  No pain to palpation bilateral lower extremities.  Range of motion, stability, muscle strength, and muscle tone normal bilateral feet and legs.    Lymphadenopathy:      Lower Body: No right inguinal adenopathy. No left inguinal adenopathy.      Comments: Negative lymphadenopathy bilateral popliteal fossa and tarsal tunnel.    Negative lymphangitic streaking bilateral feet/ankles/legs.   Skin:     General: Skin is warm and dry.      Capillary Refill: Capillary refill takes 2 to 3 seconds.      Coloration: Skin is not pale.      Findings: No abrasion, bruising, burn, ecchymosis, erythema, laceration, lesion or rash.      Nails: There is no clubbing.      Comments:   Skin is normal age and health appropriate color, turgor, texture, and temperature bilateral lower extremities without ulceration, hyperpigmentation, discoloration, masses nodules or cords palpated.  No ecchymosis, erythema, edema, or cardinal signs of infection bilateral lower extremities.     Neurological:      Mental Status: She is alert and oriented to person, place, and time.      Sensory: No sensory deficit.      Motor: No tremor, atrophy or abnormal muscle tone.      Gait: Gait normal.      Deep Tendon Reflexes:      Reflex Scores:       Patellar reflexes are 2+ on the right side and 2+ on the left side.       Achilles reflexes are 2+ on the right side and 2+ on the left side.     Comments: Negative tinel sign to percussion sural, superficial peroneal, deep peroneal, saphenous, and posterior tibial nerves right and left ankles and feet.      Negative allodynia.    Psychiatric:         Behavior: Behavior is cooperative.               Assessment:       Encounter Diagnoses   Name Primary?    Arthritis of foot Yes    Foot pain, right          Plan:       Codi was seen today for foot problem, foot pain and follow-up.    Diagnoses and all orders for this visit:    Arthritis of foot    Foot pain, right      I counseled the patient on her conditions, their implications and medical management.    Dispense night splint.    Continue stretches, inserts, custom orthotics, atheltic shoes, nsaids prn.    Rx PT - scheduled 9/1/22    Declines injection/MRI pending improvement.              No follow-ups on file.

## 2022-08-10 ENCOUNTER — HOSPITAL ENCOUNTER (OUTPATIENT)
Dept: RADIOLOGY | Facility: HOSPITAL | Age: 42
Discharge: HOME | End: 2022-08-10
Attending: NURSE PRACTITIONER
Payer: COMMERCIAL

## 2022-08-10 DIAGNOSIS — Z12.31 ENCOUNTER FOR SCREENING MAMMOGRAM FOR MALIGNANT NEOPLASM OF BREAST: ICD-10-CM

## 2022-08-10 PROCEDURE — 77067 MAMMO DIGITAL SCREENING BILAT WITH TOMO: ICD-10-PCS | Mod: 26,,, | Performed by: RADIOLOGY

## 2022-08-10 PROCEDURE — 77067 SCR MAMMO BI INCL CAD: CPT | Mod: 26,,, | Performed by: RADIOLOGY

## 2022-08-10 PROCEDURE — 77063 MAMMO DIGITAL SCREENING BILAT WITH TOMO: ICD-10-PCS | Mod: 26,,, | Performed by: RADIOLOGY

## 2022-08-10 PROCEDURE — 77063 BREAST TOMOSYNTHESIS BI: CPT | Mod: 26,,, | Performed by: RADIOLOGY

## 2022-08-10 PROCEDURE — 77067 SCR MAMMO BI INCL CAD: CPT | Mod: TC,PO

## 2022-09-01 ENCOUNTER — CLINICAL SUPPORT (OUTPATIENT)
Dept: REHABILITATION | Facility: HOSPITAL | Age: 42
End: 2022-09-01
Attending: PODIATRIST
Payer: COMMERCIAL

## 2022-09-01 DIAGNOSIS — M19.079 ARTHRITIS OF FOOT: ICD-10-CM

## 2022-09-01 DIAGNOSIS — G89.29 CHRONIC PAIN OF RIGHT ANKLE: ICD-10-CM

## 2022-09-01 DIAGNOSIS — R26.89 ANTALGIC GAIT: ICD-10-CM

## 2022-09-01 DIAGNOSIS — M25.571 CHRONIC PAIN OF RIGHT ANKLE: ICD-10-CM

## 2022-09-01 DIAGNOSIS — R29.898 ANKLE WEAKNESS: ICD-10-CM

## 2022-09-01 DIAGNOSIS — M79.671 FOOT PAIN, RIGHT: ICD-10-CM

## 2022-09-01 PROCEDURE — 97110 THERAPEUTIC EXERCISES: CPT | Mod: PN

## 2022-09-01 PROCEDURE — 97161 PT EVAL LOW COMPLEX 20 MIN: CPT | Mod: PN

## 2022-09-01 NOTE — PLAN OF CARE
[facility]  Physical Therapy Initial Evaluation  Date: 9/1/2022   Name: Codi Wilkerson  Clinic Number: [id number]    Therapy Diagnosis:   Encounter Diagnoses   Name Primary?    Arthritis of foot     Foot pain, right     Chronic pain of right ankle     Antalgic gait     Ankle weakness      Physician: Eduardo Perez, YUN    Physician Orders: PT Eval and Treat   Medical Diagnosis from Referral: Arthritis of foot M79.671 (ICD-10-CM) - Foot pain, right  Evaluation Date: 9/1/2022  Authorization Period Expiration: 08/25/2023  Plan of Care Expiration: 12-1-22  Visit # / Visits authorized: 1/ 20 Progress Note Due: 10-1-22  FOTO: 1/ 1    Precautions: Standard    Time In: 7:05 am  Time Out: 7:45 am  Total Appointment Time (timed & untimed codes): 45 minutes    Subjective   Date of onset: several years ago  History of current condition - Codi reports: that she has pain in the lateral ankle. She has pain that shoots down towards foot. She states she has difficult to stand on it. Pain increases after 7 hours standing on it. Pt states she feels she rolls her R ankle. She states since she got the shoes insert she does not feel a lot pain. Pt states she feel increase in R lateral ankle edema by the evening.     M.D note:  Chief Complaint: Foot Problem (Right foot), Foot Pain, and Follow-up     Deep aching, sometimts sharp pain midfoot right.  Gradual onset, worsening over past several years, aggravated by increased weight bearing, shoe gear, pressure. Stretches, custom orthtoics, atheltic shoes, nsaids help some.  Not currently stretching at home.  Awaiting PT appointment..  Denies trauma.     Review of Systems   Constitutional: Negative for chills, diaphoresis, fever, malaise/fatigue and night sweats.   Cardiovascular: Negative for claudication, cyanosis, leg swelling and syncope.   Skin: Negative for color change, dry skin, nail changes, rash, suspicious lesions and unusual hair distribution.    Musculoskeletal: Positive for joint pain and joint swelling. Negative for falls, muscle cramps, muscle weakness and stiffness.   Gastrointestinal: Negative for constipation, diarrhea, nausea and vomiting.   Neurological: Negative for brief paralysis, disturbances in coordination, focal weakness, numbness, paresthesias, sensory change and tremors.    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  There is no evidence for acute fracture or bone destruction.  There is no evidence for dislocation.  There are mild degenerative changes with small osteophytes at the dorsal aspect of the foot at the talonavicular joint.  There is a calcaneal spur at the insertion of the plantar fascia.  No radiopaque soft tissue foreign bodies are identified.     Impression:     No evidence for acute fracture, bone destruction, or dislocation.     Mild degenerative changes.     Calcaneal spur at the insertion of the plantar fascia.    Medical History:   Past Medical History:   Diagnosis Date    Anxiety and depression     psychiatrist Dr. Casanova    Bilateral ovarian cysts 5/30/2017    Diabetes mellitus 2014    type 2    Elevated CEA 5/12/2017    Female pelvic pain 5/30/2017    GERD (gastroesophageal reflux disease)      HLD (hyperlipidemia)     Lesion of right lung 2017    Migraine headache     Right ovarian cyst 2017       Surgical History:   Codi Wilkerson  has a past surgical history that includes  section, low transverse (, ); Ectopic pregnancy surgery (); Partial hysterectomy (3 years ago); Salpingectomy (Right, ); Hysterectomy (); Colonoscopy (N/A, 2017); and Colonoscopy (N/A, 2021).    Medications:   Codi has a current medication list which includes the following prescription(s): blood sugar diagnostic, blood-glucose meter, clonazepam, trulicity, jardiance, ergocalciferol, fenofibrate micronized, hydroxyzine hcl, lancets, linaclotide, losartan, lurasidone, meloxicam, ondansetron, and trazodone, and the following Facility-Administered Medications: lactated ringers and sodium chloride 0.9%.    Allergies:   Review of patient's allergies indicates:   Allergen Reactions    Morphine Itching            Objective     Observation:     Posture Alignment: R foot ER     Sensation: Light touch:intact to light touch    DTR: intact to light touch    GAIT DEVIATIONS: Codi displays antalgic gait    Ankle Range of Motion:   Ankle Right Left   Dorsiflexion WFL ankle stiffness WFL   Plantarflexion WFL ankle stiffness WFL   Inversion WFL ankle stiffness WFL   Eversion WFL ankle stiffness WFL      Strength:   Right Ankle   Left Ankle    Dorsiflexion: 4+/5 Dorsiflexion: 4+/5   Plantarflexion:  4+/5 Plantarflexion: 4+/5   Inversion: 4+/5 Inversion: 4+/5   Eversion: 4+/5 Eversion: 4+/5       Right LE  Left LE    Hip flexion: 4+/5 Hip flexion: 4+/5   Hip extension:  4/5 Hip extension: 4+/5   Hip abduction: 4/5 Hip abduction: 4+/5   Hip adduction: 4/5 Hip a  dduction 4+/5   Knee extension: 4+/5 Knee extension: 4+/5   Knee flexion: 4+/5 Knee flexion: 4+/5     Special Tests:   Right   Posterior Drawer Test Negative    Anterior Drawer Test negative   Squeeze test negative   Aponte test negative    Subtalar Neutral +   Heel walking negative   Toe walking negative   Navicular Drop +   Windlass test +     SLS: R: 2 sec & L :30 sec     Joint Mobility: TCJ stiffness     Palpation: increase pain at lateral R ankle by lateral malleolus     Flexibility: Medial gastroc tightness      CMS Impairment/Limitation/Restriction for FOTO Foot Survey  Status Limitation G-Code CMS Severity Modifier  Intake 49% 51% Current Status CK - At least 40 percent but less than 60 percent  Predicted 65% 35% Goal Status+ CJ - At least 20 percent but less than 40 percent  D/C Status CK **only report if this is a one time visit  +Based on FOTO predicted change score      TREATMENT   Treatment Time In: 7:30 am  Treatment Time Out: 7:40 am  Total Treatment time separate from Evaluation: 10 minutes    Codi received therapeutic exercises to develop strength, endurance, ROM, and flexibility for 10 minutes including:  Arch lift   Ankle windshield wiper   Ankle circles       Home Exercises and Patient Education Provided    Education provided:   - Perfrom HEP 2 times     Written Home Exercises Provided: Patient instructed to cont prior HEP.  Exercises were reviewed and Codi was able to demonstrate them prior to the end of the session.  Codi demonstrated good  understanding of the education provided.     See EMR under Patient Instructions for exercises provided 9/1/2022.    Assessment   Codi is a 41 y.o. female referred to outpatient Physical Therapy with a medical diagnosis of Arthritis of foot M79.671 (ICD-10-CM) - Foot pain, right. Pt presents to therapy with R lateral ankle pain. Pain is localized by lateral malleolus region. Pt has been wearing ankle orthotics, which helped decrease pain. Pt ambulated with R foot ER. Pt ambulated with antalgic gait pattern in the end of the day due to increase pain. Pt demonstrated weakness of R hip muscles. She also has decrease in R ankle proprioception and muscles strength during CKC and dynamic  mobility. Medial gastroc tightness was noted. During palpation, increase pain at lateral R ankle. TCJ stiffness was noted today. Pt will benefit from skilled PT services to decrease pain to return to work pain free.     Pt prognosis is Good.   Pt will benefit from skilled outpatient Physical Therapy to address the deficits stated above and in the chart below, provide pt/family education, and to maximize pt's level of independence.     Plan of care discussed with patient: Yes  Pt's spiritual, cultural and educational needs considered and patient is agreeable to the plan of care and goals as stated below:     Anticipated Barriers for therapy: None    Medical Necessity is demonstrated by the following  History  Co-morbidities and personal factors that may impact the plan of care Co-morbidities:   Anxiety and depression    psychiatrist Dr. Casanova   Bilateral ovarian cysts    Diabetes mellitus    type 2   Elevated CEA    Female pelvic pain    GERD (gastroesophageal reflux disease)    HLD (hyperlipidemia)    Lesion of right lung    Migraine headache    Right ovarian cyst        Personal Factors:   no deficits     Complexity: low   Examination  Body Structures and Functions, activity limitations and participation restrictions that may impact the plan of care Body Regions:   ankle    Body Systems:    ROM  strength  balance  gait  transfers  transitions  motor control  motor learning    Participation Restrictions:   Community ambulation    Activity limitations:   Learning and applying knowledge  no deficits    General Tasks and Commands  no deficits    Communication  no deficits    Mobility  walking  moving around using equipment (WC)  using transportation (bus, train, plane, car)  driving (bike, car, motorcycle)    Self care  no deficits    Domestic Life  shopping  cooking  doing house work (cleaning house, washing dishes, laundry)    Interactions/Relationships  no deficits    Life Areas  no deficits    Community and  Social Life  community life  recreation and leisure         Complexity: low  See FOTO outcome assessment   Clinical Presentation stable and uncomplicated low   Decision Making/ Complexity Score: low     Goals:  GOALS: Short Term Goals:  4 weeks  1.Report decreased  in  pain at worse less than  <   / =  5  /10  to increase tolerance for improved functional actvities. On going  2. Pt to report decrease R lateral ankle pain by 50% after long day at work to improve quality of life   3. Pt to tolerate HEP to improve ROM and independence with ADL's. On going  4. Pt will score R SLS greater than 15 sec to improve R ankle muscles strength during functional mobility.     Long Term Goals: 8 weeks  1.Report decreased  in  pain at worse  less than  <   / =  2  /10  to increase tolerance for improved functional actvities. On going  2.Pt to report decrease R lateral ankle pain by 80% after long day at work to improve quality of life   3. Pt will score at least 35% on  FOTO outcome assessment  to increase functional activities and mobility. On going  4. Pt to be Independent with HEP to improve ROM and independence with ADL's. On going  5.Pt will score R SLS greater than 30 sec to improve R ankle muscles strength during functional mobility.       Plan   Plan of care Certification: 9/1/2022 to 12-1-22.    Outpatient Physical Therapy 2 times weekly for 12 weeks to include the following interventions: Manual Therapy, Moist Heat/ Ice, Neuromuscular Re-ed, Patient Education, Self Care, Therapeutic Activities, and Therapeutic Exercise. Dry needling.     Norm Nugent, PT      I CERTIFY THE NEED FOR THESE SERVICES FURNISHED UNDER THIS PLAN OF TREATMENT AND WHILE UNDER MY CARE   Physician's comments:     Physician's Signature: ___________________________________________________

## 2022-09-09 ENCOUNTER — CLINICAL SUPPORT (OUTPATIENT)
Dept: REHABILITATION | Facility: HOSPITAL | Age: 42
End: 2022-09-09
Attending: PODIATRIST
Payer: COMMERCIAL

## 2022-09-09 DIAGNOSIS — M25.571 CHRONIC PAIN OF RIGHT ANKLE: Primary | ICD-10-CM

## 2022-09-09 DIAGNOSIS — R26.89 ANTALGIC GAIT: ICD-10-CM

## 2022-09-09 DIAGNOSIS — R29.898 ANKLE WEAKNESS: ICD-10-CM

## 2022-09-09 DIAGNOSIS — G89.29 CHRONIC PAIN OF RIGHT ANKLE: Primary | ICD-10-CM

## 2022-09-09 PROCEDURE — 97110 THERAPEUTIC EXERCISES: CPT | Mod: PN

## 2022-09-09 NOTE — PROGRESS NOTES
[facility]   Physical Therapy Treatment Note     Name: Codi Wilkerson  Clinic Number: [id number]    Therapy Diagnosis:   Encounter Diagnoses   Name Primary?    Chronic pain of right ankle Yes    Antalgic gait     Ankle weakness      Physician: Eduardo Perez DPM    Visit Date: 9/9/2022    Physician: Eduardo Perez DPM     Physician Orders: PT Eval and Treat   Medical Diagnosis from Referral: Arthritis of foot M79.671 (ICD-10-CM) - Foot pain, right  Evaluation Date: 9/1/2022  Authorization Period Expiration: 08/25/2023  Plan of Care Expiration: 12-1-22  Visit # / Visits authorized: 1/ 20 Progress Note Due: 10-1-22  FOTO: 1/ 1     Precautions: Standard    Time In: 7:00 am  Time Out: 7:40 am  Total Billable Time: 40  minutes      SUBJECTIVE     Pt reports: that she has not have any lateral R ankle pain for the past 3 weeks. She states orthosis helped decrease pain. Pt states she has L shoulder pain. She had steroid injection on left shoulder couple days ago. She cannot perform exercises lying down on her L side   She was compliant with home exercise program.  Response to previous treatment: No change  Functional change: None    Pain: 0/10  Location: right lateral ankle      OBJECTIVE     Objective Measures updated at progress report unless specified.       TREATMENT     Codi received the treatments listed below:      received therapeutic exercises to develop strength and ROM for 40  minutes including:    Nustep 6 min  Standing  hip abd YTB 3x10  SL hip add 3x10   Prone hip extension 3x10  Arch lift 3x10  Ankle windshield wiper 3x10  Ankle circles  3x10  Marbles  1 trial   [place] 2 min   Tandem stance with arch lift 3x30 sec  SLS with arch lift 3x30 sec     received the following manual therapy techniques: Soft tissue Mobilization were applied to the: NP for 00 minutes, including:      PATIENT EDUCATION AND HOME EXERCISES   Home Exercises and Patient Education Provided      Education provided:   - Perfrom HEP 2 times      Written Home Exercises Provided: Patient instructed to cont prior HEP.  Exercises were reviewed and Codi was able to demonstrate them prior to the end of the session.  Codi demonstrated good  understanding of the education provided.      See EMR under Patient Instructions for exercises provided 9/1/2022.    ASSESSMENT     Pt tolerated PT session well today. Pt did not have any provocation of R foot pain. Pt cont ambulated with R foot ER. Heavy v/c's for R foot placements. Pt demonstrated weakness of B hip and weakness of R intrinsic foot muscles. She required heavy v/c's to perform exercises with proper form.  Tactile cues required to avoid R foot excessive pronation. R foot muscles fasciculation was noted today. Pt will cont benefiting from skilled PT services to return to PLOF.     Codi Is progressing well towards her goals.   Pt prognosis is Good.     Pt will continue to benefit from skilled outpatient physical therapy to address the deficits listed in the problem list box on initial evaluation, provide pt/family education and to maximize pt's level of independence in the home and community environment.     Pt's spiritual, cultural and educational needs considered and pt agreeable to plan of care and goals.     Anticipated barriers to physical therapy: none    Goals:  GOALS: Short Term Goals:  4 weeks  1.Report decreased  in  pain at worse less than  <   / =  5  /10  to increase tolerance for improved functional actvities. On going  2. Pt to report decrease R lateral ankle pain by 50% after long day at work to improve quality of life   3. Pt to tolerate HEP to improve ROM and independence with ADL's. On going  4. Pt will score R SLS greater than 15 sec to improve R ankle muscles strength during functional mobility.      Long Term Goals: 8 weeks  1.Report decreased  in  pain at worse  less than  <   / =  2  /10  to increase tolerance for improved  functional actvities. On going  2.Pt to report decrease R lateral ankle pain by 80% after long day at work to improve quality of life   3. Pt will score at least 35% on  FOTO outcome assessment  to increase functional activities and mobility. On going  4. Pt to be Independent with HEP to improve ROM and independence with ADL's. On going  5.Pt will score R SLS greater than 30 sec to improve R ankle muscles strength during functional mobility.        PLAN     Plan of care Certification: 9/1/2022 to 12-1-22.    Norm Nugent, PT

## 2022-09-19 ENCOUNTER — OFFICE VISIT (OUTPATIENT)
Dept: PODIATRY | Facility: CLINIC | Age: 42
End: 2022-09-19
Payer: COMMERCIAL

## 2022-09-19 VITALS — HEIGHT: 64 IN | BODY MASS INDEX: 30.22 KG/M2 | WEIGHT: 177 LBS

## 2022-09-19 DIAGNOSIS — M19.079 ARTHRITIS OF FOOT: Primary | ICD-10-CM

## 2022-09-19 DIAGNOSIS — M79.671 FOOT PAIN, RIGHT: ICD-10-CM

## 2022-09-19 PROCEDURE — 3061F PR NEG MICROALBUMINURIA RESULT DOCUMENTED/REVIEW: ICD-10-PCS | Mod: CPTII,S$GLB,, | Performed by: PODIATRIST

## 2022-09-19 PROCEDURE — 1159F PR MEDICATION LIST DOCUMENTED IN MEDICAL RECORD: ICD-10-PCS | Mod: CPTII,S$GLB,, | Performed by: PODIATRIST

## 2022-09-19 PROCEDURE — 1159F MED LIST DOCD IN RCRD: CPT | Mod: CPTII,S$GLB,, | Performed by: PODIATRIST

## 2022-09-19 PROCEDURE — 3061F NEG MICROALBUMINURIA REV: CPT | Mod: CPTII,S$GLB,, | Performed by: PODIATRIST

## 2022-09-19 PROCEDURE — 3051F PR MOST RECENT HEMOGLOBIN A1C LEVEL 7.0 - < 8.0%: ICD-10-PCS | Mod: CPTII,S$GLB,, | Performed by: PODIATRIST

## 2022-09-19 PROCEDURE — 3066F NEPHROPATHY DOC TX: CPT | Mod: CPTII,S$GLB,, | Performed by: PODIATRIST

## 2022-09-19 PROCEDURE — 3008F PR BODY MASS INDEX (BMI) DOCUMENTED: ICD-10-PCS | Mod: CPTII,S$GLB,, | Performed by: PODIATRIST

## 2022-09-19 PROCEDURE — 4010F ACE/ARB THERAPY RXD/TAKEN: CPT | Mod: CPTII,S$GLB,, | Performed by: PODIATRIST

## 2022-09-19 PROCEDURE — 1160F RVW MEDS BY RX/DR IN RCRD: CPT | Mod: CPTII,S$GLB,, | Performed by: PODIATRIST

## 2022-09-19 PROCEDURE — 3066F PR DOCUMENTATION OF TREATMENT FOR NEPHROPATHY: ICD-10-PCS | Mod: CPTII,S$GLB,, | Performed by: PODIATRIST

## 2022-09-19 PROCEDURE — 4010F PR ACE/ARB THEARPY RXD/TAKEN: ICD-10-PCS | Mod: CPTII,S$GLB,, | Performed by: PODIATRIST

## 2022-09-19 PROCEDURE — 99999 PR PBB SHADOW E&M-EST. PATIENT-LVL IV: CPT | Mod: PBBFAC,,, | Performed by: PODIATRIST

## 2022-09-19 PROCEDURE — 1160F PR REVIEW ALL MEDS BY PRESCRIBER/CLIN PHARMACIST DOCUMENTED: ICD-10-PCS | Mod: CPTII,S$GLB,, | Performed by: PODIATRIST

## 2022-09-19 PROCEDURE — 3008F BODY MASS INDEX DOCD: CPT | Mod: CPTII,S$GLB,, | Performed by: PODIATRIST

## 2022-09-19 PROCEDURE — 99999 PR PBB SHADOW E&M-EST. PATIENT-LVL IV: ICD-10-PCS | Mod: PBBFAC,,, | Performed by: PODIATRIST

## 2022-09-19 PROCEDURE — 3051F HG A1C>EQUAL 7.0%<8.0%: CPT | Mod: CPTII,S$GLB,, | Performed by: PODIATRIST

## 2022-09-19 PROCEDURE — 99212 PR OFFICE/OUTPT VISIT, EST, LEVL II, 10-19 MIN: ICD-10-PCS | Mod: S$GLB,,, | Performed by: PODIATRIST

## 2022-09-19 PROCEDURE — 99212 OFFICE O/P EST SF 10 MIN: CPT | Mod: S$GLB,,, | Performed by: PODIATRIST

## 2022-09-19 NOTE — PROGRESS NOTES
Subjective:      Patient ID: Codi Wilkerson is a 41 y.o. female.    Chief Complaint: Foot Problem (Right foot ) and Follow-up    Deep aching, sometimts sharp pain midfoot right.  Gradual onset, improved to resolution of symptoms with custom orthotics and PT, aggravated by increased weight bearing, shoe gear, pressure. Stretches, custom orthtoics, atheltic shoes, nsaids    Denies trauma.    Review of Systems   Constitutional: Negative for chills, diaphoresis, fever, malaise/fatigue and night sweats.   Cardiovascular:  Negative for claudication, cyanosis, leg swelling and syncope.   Skin:  Negative for color change, dry skin, nail changes, rash, suspicious lesions and unusual hair distribution.   Musculoskeletal:  Positive for joint pain and joint swelling. Negative for falls, muscle cramps, muscle weakness and stiffness.   Gastrointestinal:  Negative for constipation, diarrhea, nausea and vomiting.   Neurological:  Negative for brief paralysis, disturbances in coordination, focal weakness, numbness, paresthesias, sensory change and tremors.         Objective:      Physical Exam  Constitutional:       General: She is not in acute distress.     Appearance: She is well-developed. She is not diaphoretic.   Cardiovascular:      Pulses:           Popliteal pulses are 2+ on the right side and 2+ on the left side.        Dorsalis pedis pulses are 2+ on the right side and 2+ on the left side.        Posterior tibial pulses are 2+ on the right side and 2+ on the left side.      Comments: Capillary refill 3 seconds all toes/distal feet, all toes/both feet warm to touch.      Negative lymphadenopathy bilateral popliteal fossa and tarsal tunnel.      Negavie lower extremity edema bilateral.    Musculoskeletal:      Right ankle: No swelling, deformity, ecchymosis or lacerations. Normal range of motion. Normal pulse.      Right Achilles Tendon: Normal. No defects. Aponte's test negative.      Comments: Essentially no motion  present at stj, mtj right with increased girth of rearfootcomplex without loss of function ,signs acue trauma, or gross deformity.    No current midfoot pain right without focal point of pain to palpation, range of motion.    Otherwise,. Normal angle, base, station of gait. All ten toes without clubbing, cyanosis, or signs of ischemia.  No pain to palpation bilateral lower extremities.  Range of motion, stability, muscle strength, and muscle tone normal bilateral feet and legs.    Lymphadenopathy:      Lower Body: No right inguinal adenopathy. No left inguinal adenopathy.      Comments: Negative lymphadenopathy bilateral popliteal fossa and tarsal tunnel.    Negative lymphangitic streaking bilateral feet/ankles/legs.   Skin:     General: Skin is warm and dry.      Capillary Refill: Capillary refill takes 2 to 3 seconds.      Coloration: Skin is not pale.      Findings: No abrasion, bruising, burn, ecchymosis, erythema, laceration, lesion or rash.      Nails: There is no clubbing.      Comments:   Skin is normal age and health appropriate color, turgor, texture, and temperature bilateral lower extremities without ulceration, hyperpigmentation, discoloration, masses nodules or cords palpated.  No ecchymosis, erythema, edema, or cardinal signs of infection bilateral lower extremities.     Neurological:      Mental Status: She is alert and oriented to person, place, and time.      Sensory: No sensory deficit.      Motor: No tremor, atrophy or abnormal muscle tone.      Gait: Gait normal.      Deep Tendon Reflexes:      Reflex Scores:       Patellar reflexes are 2+ on the right side and 2+ on the left side.       Achilles reflexes are 2+ on the right side and 2+ on the left side.     Comments: Negative tinel sign to percussion sural, superficial peroneal, deep peroneal, saphenous, and posterior tibial nerves right and left ankles and feet.      Negative allodynia.   Psychiatric:         Behavior: Behavior is cooperative.              Assessment:       Encounter Diagnoses   Name Primary?    Arthritis of foot Yes    Foot pain, right          Plan:       Codi was seen today for foot problem and follow-up.    Diagnoses and all orders for this visit:    Arthritis of foot    Foot pain, right    I counseled the patient on her conditions, their implications and medical management.        Continue stretches, inserts, custom orthotics, atheltic shoes, night splint, nsaids prn.                No follow-ups on file.

## 2022-10-06 ENCOUNTER — OFFICE VISIT (OUTPATIENT)
Dept: FAMILY MEDICINE | Facility: CLINIC | Age: 42
End: 2022-10-06
Payer: COMMERCIAL

## 2022-10-06 VITALS
TEMPERATURE: 98 F | OXYGEN SATURATION: 97 % | HEART RATE: 79 BPM | WEIGHT: 178.81 LBS | SYSTOLIC BLOOD PRESSURE: 110 MMHG | BODY MASS INDEX: 30.69 KG/M2 | DIASTOLIC BLOOD PRESSURE: 70 MMHG

## 2022-10-06 DIAGNOSIS — F32.A ANXIETY AND DEPRESSION: ICD-10-CM

## 2022-10-06 DIAGNOSIS — R11.0 NAUSEA: Primary | ICD-10-CM

## 2022-10-06 DIAGNOSIS — E78.2 MIXED HYPERLIPIDEMIA: ICD-10-CM

## 2022-10-06 DIAGNOSIS — E11.9 TYPE 2 DIABETES MELLITUS WITHOUT COMPLICATION, WITHOUT LONG-TERM CURRENT USE OF INSULIN: ICD-10-CM

## 2022-10-06 DIAGNOSIS — F41.9 ANXIETY AND DEPRESSION: ICD-10-CM

## 2022-10-06 LAB — SARS-COV-2 RNA RESP QL NAA+PROBE: NOT DETECTED

## 2022-10-06 PROCEDURE — 3051F HG A1C>EQUAL 7.0%<8.0%: CPT | Mod: CPTII,S$GLB,, | Performed by: NURSE PRACTITIONER

## 2022-10-06 PROCEDURE — 99214 OFFICE O/P EST MOD 30 MIN: CPT | Mod: S$GLB,,, | Performed by: NURSE PRACTITIONER

## 2022-10-06 PROCEDURE — 3051F PR MOST RECENT HEMOGLOBIN A1C LEVEL 7.0 - < 8.0%: ICD-10-PCS | Mod: CPTII,S$GLB,, | Performed by: NURSE PRACTITIONER

## 2022-10-06 PROCEDURE — 1159F MED LIST DOCD IN RCRD: CPT | Mod: CPTII,S$GLB,, | Performed by: NURSE PRACTITIONER

## 2022-10-06 PROCEDURE — 3066F NEPHROPATHY DOC TX: CPT | Mod: CPTII,S$GLB,, | Performed by: NURSE PRACTITIONER

## 2022-10-06 PROCEDURE — 4010F ACE/ARB THERAPY RXD/TAKEN: CPT | Mod: CPTII,S$GLB,, | Performed by: NURSE PRACTITIONER

## 2022-10-06 PROCEDURE — 3078F PR MOST RECENT DIASTOLIC BLOOD PRESSURE < 80 MM HG: ICD-10-PCS | Mod: CPTII,S$GLB,, | Performed by: NURSE PRACTITIONER

## 2022-10-06 PROCEDURE — 3008F BODY MASS INDEX DOCD: CPT | Mod: CPTII,S$GLB,, | Performed by: NURSE PRACTITIONER

## 2022-10-06 PROCEDURE — 3066F PR DOCUMENTATION OF TREATMENT FOR NEPHROPATHY: ICD-10-PCS | Mod: CPTII,S$GLB,, | Performed by: NURSE PRACTITIONER

## 2022-10-06 PROCEDURE — 3061F NEG MICROALBUMINURIA REV: CPT | Mod: CPTII,S$GLB,, | Performed by: NURSE PRACTITIONER

## 2022-10-06 PROCEDURE — 82962 POCT GLUCOSE, HAND-HELD DEVICE: ICD-10-PCS | Mod: S$GLB,,, | Performed by: NURSE PRACTITIONER

## 2022-10-06 PROCEDURE — 4010F PR ACE/ARB THEARPY RXD/TAKEN: ICD-10-PCS | Mod: CPTII,S$GLB,, | Performed by: NURSE PRACTITIONER

## 2022-10-06 PROCEDURE — 3061F PR NEG MICROALBUMINURIA RESULT DOCUMENTED/REVIEW: ICD-10-PCS | Mod: CPTII,S$GLB,, | Performed by: NURSE PRACTITIONER

## 2022-10-06 PROCEDURE — U0003 INFECTIOUS AGENT DETECTION BY NUCLEIC ACID (DNA OR RNA); SEVERE ACUTE RESPIRATORY SYNDROME CORONAVIRUS 2 (SARS-COV-2) (CORONAVIRUS DISEASE [COVID-19]), AMPLIFIED PROBE TECHNIQUE, MAKING USE OF HIGH THROUGHPUT TECHNOLOGIES AS DESCRIBED BY CMS-2020-01-R: HCPCS | Performed by: NURSE PRACTITIONER

## 2022-10-06 PROCEDURE — 99214 PR OFFICE/OUTPT VISIT, EST, LEVL IV, 30-39 MIN: ICD-10-PCS | Mod: S$GLB,,, | Performed by: NURSE PRACTITIONER

## 2022-10-06 PROCEDURE — 1159F PR MEDICATION LIST DOCUMENTED IN MEDICAL RECORD: ICD-10-PCS | Mod: CPTII,S$GLB,, | Performed by: NURSE PRACTITIONER

## 2022-10-06 PROCEDURE — 3008F PR BODY MASS INDEX (BMI) DOCUMENTED: ICD-10-PCS | Mod: CPTII,S$GLB,, | Performed by: NURSE PRACTITIONER

## 2022-10-06 PROCEDURE — 82962 GLUCOSE BLOOD TEST: CPT | Mod: S$GLB,,, | Performed by: NURSE PRACTITIONER

## 2022-10-06 PROCEDURE — U0005 INFEC AGEN DETEC AMPLI PROBE: HCPCS | Performed by: NURSE PRACTITIONER

## 2022-10-06 PROCEDURE — 99999 PR PBB SHADOW E&M-EST. PATIENT-LVL V: ICD-10-PCS | Mod: PBBFAC,,, | Performed by: NURSE PRACTITIONER

## 2022-10-06 PROCEDURE — 99999 PR PBB SHADOW E&M-EST. PATIENT-LVL V: CPT | Mod: PBBFAC,,, | Performed by: NURSE PRACTITIONER

## 2022-10-06 PROCEDURE — 3074F SYST BP LT 130 MM HG: CPT | Mod: CPTII,S$GLB,, | Performed by: NURSE PRACTITIONER

## 2022-10-06 PROCEDURE — 3074F PR MOST RECENT SYSTOLIC BLOOD PRESSURE < 130 MM HG: ICD-10-PCS | Mod: CPTII,S$GLB,, | Performed by: NURSE PRACTITIONER

## 2022-10-06 PROCEDURE — 3078F DIAST BP <80 MM HG: CPT | Mod: CPTII,S$GLB,, | Performed by: NURSE PRACTITIONER

## 2022-10-06 RX ORDER — PROMETHAZINE HYDROCHLORIDE 12.5 MG/1
12.5 TABLET ORAL 2 TIMES DAILY PRN
Qty: 20 TABLET | Refills: 0 | Status: SHIPPED | OUTPATIENT
Start: 2022-10-06 | End: 2023-05-02

## 2022-10-06 NOTE — PROGRESS NOTES
HPI     Chief Complaint:  Chief Complaint   Patient presents with    Nausea         Codi Wilkerson is a 41 y.o. female with multiple medical diagnoses as listed in the medical history and problem list that presents for [facility].  Pt is new to me but is known to this clinic with her last appointment being Visit date not found.      Nausea  This is a recurrent problem. Episode onset: saturday. Associated symptoms include nausea. Pertinent negatives include no chest pain, diaphoresis, fever, neck pain, rash or vomiting. She has tried rest (zofran) for the symptoms. The treatment provided mild relief.     Denies changes in diet. Reports she recently began taking Prozac and believes this may be contributing to her nausea.     2 episodes of diarrhea. Denies blood in stool. Denies emesis. Denies known triggers. Denies sick contacts.     Of note pt received a flu vaccine the day prior to symptom onset.         she is compliant with medications daily without any adverse side effects.    Assessment & Plan     Problem List Items Addressed This Visit          Psychiatric    Anxiety and depression    Encouraged the patient to perform self-calming techniques, such as deep breathing/relaxation techniques and exercise.    Reports mood is stable. Denies thoughts of self harm.        Overview     psychiatrist Dr. Casanova            Cardiac/Vascular    Hyperlipidemia    discussed ways to lower triglycerides such as cutting simple sugars out of diet (white breads, candies, cookies, cakes, etc.) and reducing/eliminating intake of highly processed trans fatty acids.   Exercise 30 minutes a day for 4-5 days a week.   Eat more fiber.         Endocrine    Type 2 diabetes mellitus without complication, without long-term current use of insulin    BG in clinic 118.     -discussed with patient about routine diabetic care that includes but are not limited to regular eye exams, skin care, daily foot exam, proper nutrition, regular BG  monitoring at home (fasting and mealtime) and medication compliance in a diabetic.  Target morning BS  and meal-time BS <180    Advised to schedule diabetic eye exam.     Overview     type 2         Relevant Orders    POCT Glucose, Hand-Held Device    Ambulatory referral/consult to Ophthalmology     Other Visit Diagnoses       Nausea    -  Primary    Pt has a Hx of intermittent nausea. Denies emesis, diarrhea, constipation, abd pain or other associated symptoms. Denies recent lifestyle changes. Reports starting prozac within the past 2 weeks and believes new medication may be contributing to current symptoms.     Physical exam unremarkable. Minimal improvement with zofran.     Advised pt to follow up with psychiatrist regarding potential medication/prozac adjustment.      Pt states she has taken phenergan po in the past with good effect.     Phenergan prn     Discussed DDx, condition, and treatment.   Education sent to patient portal/included in after visit summary.  ED precautions given.   Notify provider if symptoms do not resolve or increase in severity.   Patient verbalizes understanding and agrees with plan of care.      Relevant Medications    promethazine (PHENERGAN) 12.5 MG Tab    Other Relevant Orders    COVID-19 Routine Screening          --------------------------------------------      Health Maintenance:  Health Maintenance         Date Due Completion Date    Pneumococcal Vaccines (Age 0-64) (1 - PCV) Never done ---    TETANUS VACCINE Never done ---    Low Dose Statin Never done ---    COVID-19 Vaccine (4 - Booster for Pfizer series) 12/17/2021 10/22/2021    Eye Exam 04/12/2022 4/12/2021 (Done)    Override on 4/12/2021: Done (Dr. Crump  - april 2021)    Influenza Vaccine (1) 09/01/2022 10/8/2021    Override on 10/5/2020: Done (oct 2020 external)    Override on 10/1/2019: Done    Hemoglobin A1c 01/08/2023 7/8/2022    Foot Exam 03/11/2023 3/11/2022    Override on 2/3/2020: Done    Lipid Panel  07/08/2023 7/8/2022    Diabetes Urine Screening 07/09/2023 7/9/2022    Mammogram 08/10/2023 8/10/2022            Health maintenance reviewed.  Vaccines offered patient declined at this time. Diabetic eye exam.     Follow Up:  No follow-ups on file.    Exam     Review of Systems:  (as noted above)  Review of Systems   Constitutional:  Negative for diaphoresis and fever.   HENT:  Negative for trouble swallowing and voice change.    Eyes:  Negative for visual disturbance.   Respiratory:  Negative for chest tightness, shortness of breath and wheezing.    Cardiovascular:  Negative for chest pain and palpitations.   Gastrointestinal:  Positive for nausea. Negative for anal bleeding, blood in stool and vomiting.   Endocrine: Negative for polydipsia and polyphagia.   Genitourinary:  Negative for decreased urine volume, hematuria and vaginal pain.   Musculoskeletal:  Negative for neck pain.   Skin:  Negative for rash and wound.   Neurological:  Negative for seizures and speech difficulty.   Psychiatric/Behavioral:  Negative for confusion, decreased concentration, self-injury and suicidal ideas.      Physical Exam:   Physical Exam  Constitutional:       General: She is not in acute distress.     Appearance: She is not ill-appearing or diaphoretic.   HENT:      Head: Normocephalic and atraumatic.   Eyes:      General: No scleral icterus.     Pupils: Pupils are equal, round, and reactive to light.   Neck:      Vascular: No carotid bruit.   Cardiovascular:      Rate and Rhythm: Normal rate and regular rhythm.      Pulses: Normal pulses.      Heart sounds: No murmur heard.    No friction rub. No gallop.   Pulmonary:      Effort: No respiratory distress.      Breath sounds: No wheezing.   Chest:      Chest wall: No tenderness.   Abdominal:      General: Abdomen is flat. Bowel sounds are normal. There is no distension.      Palpations: Abdomen is soft. There is no mass.      Tenderness: There is no abdominal tenderness. There is no  right CVA tenderness, left CVA tenderness, guarding or rebound.      Hernia: No hernia is present.   Musculoskeletal:         General: No signs of injury.      Cervical back: No rigidity or tenderness.   Lymphadenopathy:      Cervical: No cervical adenopathy.   Skin:     Capillary Refill: Capillary refill takes 2 to 3 seconds.      Findings: No rash.   Neurological:      Mental Status: She is alert.      Cranial Nerves: No cranial nerve deficit.      Sensory: No sensory deficit.      Motor: No weakness.   Psychiatric:         Mood and Affect: Mood normal.     Vitals:    10/06/22 1131   BP: 110/70   BP Location: Left arm   Patient Position: Sitting   BP Method: Large (Manual)   Pulse: 79   Temp: 98.4 °F (36.9 °C)   TempSrc: Oral   SpO2: 97%   Weight: 81.1 kg (178 lb 12.7 oz)      Body mass index is 30.69 kg/m².        History     Past Medical History:  Past Medical History:   Diagnosis Date    Anxiety and depression     psychiatrist Dr. Casanova    Bilateral ovarian cysts 2017    Diabetes mellitus 2014    type 2    Elevated CEA 2017    Female pelvic pain 2017    GERD (gastroesophageal reflux disease)     HLD (hyperlipidemia)     Lesion of right lung 2017    Migraine headache     Right ovarian cyst 2017       Past Surgical History:  Past Surgical History:   Procedure Laterality Date     SECTION, LOW TRANSVERSE  ,     COLONOSCOPY N/A 2017    Procedure: COLONOSCOPY;  Surgeon: Phill Valencia MD;  Location: [address]);  Service: Endoscopy;  Laterality: N/A; unremarkable, Repeat colonoscopy at regular screening age for screening purposes    COLONOSCOPY N/A 2021    Procedure: COLONOSCOPY;  Surgeon: Kevin Conner MD;  Location: [facility];  Service: Endoscopy;  Laterality: N/A;    ECTOPIC PREGNANCY SURGERY  2003    laparotomy     HYSTERECTOMY  2009    supracervical LH.     PARTIAL HYSTERECTOMY  3 years ago    SALPINGECTOMY Right        Social History:  Social  History     Socioeconomic History    Marital status:    Tobacco Use    Smoking status: Former     Packs/day: 1.50     Years: 7.00     Pack years: 10.50     Types: Cigarettes     Quit date: 12/1/2018     Years since quitting: 3.8    Smokeless tobacco: Never   Substance and Sexual Activity    Alcohol use: Yes     Alcohol/week: 2.5 standard drinks     Types: 3 Standard drinks or equivalent per week     Comment: Just during special occasions    Drug use: No    Sexual activity: Yes     Partners: Male     Birth control/protection: None     Social Determinants of Health     Financial Resource Strain: Medium Risk    Difficulty of Paying Living Expenses: Somewhat hard   Food Insecurity: No Food Insecurity    Worried About Running Out of Food in the Last Year: Never true    Ran Out of Food in the Last Year: Never true   Transportation Needs: No Transportation Needs    Lack of Transportation (Medical): No    Lack of Transportation (Non-Medical): No   Physical Activity: Inactive    Days of Exercise per Week: 0 days    Minutes of Exercise per Session: 0 min   Stress: Stress Concern Present    Feeling of Stress : To some extent   Social Connections: Unknown    Frequency of Communication with Friends and Family: More than three times a week    Frequency of Social Gatherings with Friends and Family: Once a week    Active Member of Clubs or Organizations: No    Attends Club or Organization Meetings: Never    Marital Status:    Housing Stability: Low Risk     Unable to Pay for Housing in the Last Year: No    Number of Places Lived in the Last Year: 1    Unstable Housing in the Last Year: No       Family History:  Family History   Problem Relation Age of Onset    Depression Mother     Migraines Mother     Thyroid nodules Mother         benign    Hypertension Father     Hyperlipidemia Father     Hypertension Brother     Heart disease Maternal Grandmother     Heart disease Maternal Grandfather     Hypertension Maternal  Grandfather     Colon cancer Neg Hx     Esophageal cancer Neg Hx     Crohn's disease Neg Hx     Irritable bowel syndrome Neg Hx     Stomach cancer Neg Hx     Rectal cancer Neg Hx     Ulcerative colitis Neg Hx     Celiac disease Neg Hx        Allergies and Medications: (updated and reviewed)  Review of patient's allergies indicates:   Allergen Reactions    Indomethacin Rash    Morphine Itching     Current Outpatient Medications   Medication Sig Dispense Refill    blood sugar diagnostic Strp To check BG one times daily, to use with insurance preferred meter 200 strip 3    blood-glucose meter kit To check BG one times daily, to use with insurance preferred meter 1 each 0    clonazePAM (KLONOPIN) 0.5 MG tablet Take 0.5 mg by mouth 2 (two) times daily as needed for Anxiety.      dulaglutide (TRULICITY) 3 mg/0.5 mL pen injector Inject 3 mg into the skin every 7 days. 4 pen 7    empagliflozin (JARDIANCE) 10 mg tablet Take 1 tablet (10 mg total) by mouth once daily. 90 tablet 2    ergocalciferol (ERGOCALCIFEROL) 50,000 unit Cap Take 1 capsule (50,000 Units total) by mouth every 7 days. 12 capsule 3    fenofibrate micronized (LOFIBRA) 134 MG Cap TAKE 1 CAPSULE BY MOUTH ONCE DAILY 90 capsule 3    lancets Misc To check BG one times daily, to use with insurance preferred meter 200 each 3    linaCLOtide (LINZESS) 145 mcg Cap capsule Take 1 capsule (145 mcg total) by mouth once daily. Take >30 minutes before 1st meal of the day. 30 capsule 4    losartan (COZAAR) 25 MG tablet Take 1 tablet (25 mg total) by mouth once daily. 90 tablet 3    lurasidone (LATUDA) 80 mg Tab tablet Take 80 mg by mouth once daily.      meloxicam (MOBIC) 15 MG tablet Take 1 tablet (15 mg total) by mouth once daily. 30 tablet 0    ondansetron (ZOFRAN-ODT) 4 MG TbDL Take 2 tablets (8 mg total) by mouth every 12 (twelve) hours. 60 tablet 6    traZODone (DESYREL) 50 MG tablet TAKE 1/2 TO 1 TABLET BY MOUTH ONCE DAILY AT BEDTIME      promethazine (PHENERGAN)  12.5 MG Tab Take 1 tablet (12.5 mg total) by mouth 2 (two) times daily as needed (nausea). 20 tablet 0     No current facility-administered medications for this visit.     Facility-Administered Medications Ordered in Other Visits   Medication Dose Route Frequency Provider Last Rate Last Admin    lactated ringers infusion   Intravenous Continuous Kevin Conner MD 75 mL/hr at 12/20/21 0757 New Bag at 12/20/21 0757    sodium chloride 0.9% flush 10 mL  10 mL Intravenous PRN Kevin Conner MD           Patient Care Team:  Marie Ivey MD as PCP - General (Internal Medicine)  Mely Escoto NP as Nurse Practitioner (Gastroenterology)  Norbert Hamilton LPN (Inactive) as Care Coordinator      The patient expressed understanding and no barriers to adherence were identified.      - The patient indicates understanding of these issues and agrees with the plan. Brief care plan is updated and reviewed with the patient as applicable.      - The patient is given an After Visit Summary that lists all medications with directions, allergies, education, orders placed during this encounter and follow-up instructions.      - I have reviewed the patient's medical information including past medical, family, and social history sections including the medications and allergies.      - We discussed the patient's current medications.     This note was created by combination of typed  and MModal dictation.  Transcription errors may be present.  If there are any questions, please contact me.       Sung Smith NP

## 2022-10-06 NOTE — PATIENT INSTRUCTIONS
//////////PHONE NUMBERS//////////    Bariatrics---[phone]  Breast Surgery---[phone]  Case Management---[phone]  Colonoscopy---[phone]  Imaging, Xray, CT, MRI, Ultrasound---[phone]  Infectious Disease---[phone]  Interventional Radiology---[phone]  Medical Records---[phone]  [facility]---[phone]  DME---[phone]  Optometry/Ophthalmology---[phone]  [facility]---[phone]  Physical Therapy---[phone]  Psychiatry---[phone]  Plastic Surgery---[phone]  Sleep Study---[phone]  Smoking Cessation---[phone]  Vaccine Hotline---[phone]  Wound Care---[phone]  COVID Vaccine center---[phone]  Referral Desk---[phone]    /////////////////////////////////////////////////

## 2022-10-10 ENCOUNTER — TELEPHONE (OUTPATIENT)
Dept: FAMILY MEDICINE | Facility: CLINIC | Age: 42
End: 2022-10-10
Payer: COMMERCIAL

## 2022-10-10 NOTE — TELEPHONE ENCOUNTER
----- Message from Sung Smith NP sent at 10/10/2022  7:54 AM CDT -----  Please inform the pt of her negative COVID19 result.   ----- Message -----  From: [facility]  Sent: 10/6/2022  11:46 PM CDT  To: Sung Smith NP

## 2022-10-20 ENCOUNTER — OFFICE VISIT (OUTPATIENT)
Dept: GASTROENTEROLOGY | Facility: CLINIC | Age: 42
End: 2022-10-20
Payer: COMMERCIAL

## 2022-10-20 VITALS
DIASTOLIC BLOOD PRESSURE: 84 MMHG | WEIGHT: 180.75 LBS | HEART RATE: 80 BPM | BODY MASS INDEX: 30.86 KG/M2 | SYSTOLIC BLOOD PRESSURE: 125 MMHG | HEIGHT: 64 IN

## 2022-10-20 DIAGNOSIS — R10.30 LOWER ABDOMINAL PAIN: ICD-10-CM

## 2022-10-20 DIAGNOSIS — R10.13 DYSPEPSIA: Primary | ICD-10-CM

## 2022-10-20 PROCEDURE — 1159F MED LIST DOCD IN RCRD: CPT | Mod: CPTII,S$GLB,, | Performed by: INTERNAL MEDICINE

## 2022-10-20 PROCEDURE — 99214 OFFICE O/P EST MOD 30 MIN: CPT | Mod: S$GLB,,, | Performed by: INTERNAL MEDICINE

## 2022-10-20 PROCEDURE — 3051F PR MOST RECENT HEMOGLOBIN A1C LEVEL 7.0 - < 8.0%: ICD-10-PCS | Mod: CPTII,S$GLB,, | Performed by: INTERNAL MEDICINE

## 2022-10-20 PROCEDURE — 3061F PR NEG MICROALBUMINURIA RESULT DOCUMENTED/REVIEW: ICD-10-PCS | Mod: CPTII,S$GLB,, | Performed by: INTERNAL MEDICINE

## 2022-10-20 PROCEDURE — 3074F PR MOST RECENT SYSTOLIC BLOOD PRESSURE < 130 MM HG: ICD-10-PCS | Mod: CPTII,S$GLB,, | Performed by: INTERNAL MEDICINE

## 2022-10-20 PROCEDURE — 3066F PR DOCUMENTATION OF TREATMENT FOR NEPHROPATHY: ICD-10-PCS | Mod: CPTII,S$GLB,, | Performed by: INTERNAL MEDICINE

## 2022-10-20 PROCEDURE — 3051F HG A1C>EQUAL 7.0%<8.0%: CPT | Mod: CPTII,S$GLB,, | Performed by: INTERNAL MEDICINE

## 2022-10-20 PROCEDURE — 3066F NEPHROPATHY DOC TX: CPT | Mod: CPTII,S$GLB,, | Performed by: INTERNAL MEDICINE

## 2022-10-20 PROCEDURE — 3074F SYST BP LT 130 MM HG: CPT | Mod: CPTII,S$GLB,, | Performed by: INTERNAL MEDICINE

## 2022-10-20 PROCEDURE — 3079F PR MOST RECENT DIASTOLIC BLOOD PRESSURE 80-89 MM HG: ICD-10-PCS | Mod: CPTII,S$GLB,, | Performed by: INTERNAL MEDICINE

## 2022-10-20 PROCEDURE — 99999 PR PBB SHADOW E&M-EST. PATIENT-LVL IV: ICD-10-PCS | Mod: PBBFAC,,, | Performed by: INTERNAL MEDICINE

## 2022-10-20 PROCEDURE — 3061F NEG MICROALBUMINURIA REV: CPT | Mod: CPTII,S$GLB,, | Performed by: INTERNAL MEDICINE

## 2022-10-20 PROCEDURE — 3079F DIAST BP 80-89 MM HG: CPT | Mod: CPTII,S$GLB,, | Performed by: INTERNAL MEDICINE

## 2022-10-20 PROCEDURE — 99999 PR PBB SHADOW E&M-EST. PATIENT-LVL IV: CPT | Mod: PBBFAC,,, | Performed by: INTERNAL MEDICINE

## 2022-10-20 PROCEDURE — 1159F PR MEDICATION LIST DOCUMENTED IN MEDICAL RECORD: ICD-10-PCS | Mod: CPTII,S$GLB,, | Performed by: INTERNAL MEDICINE

## 2022-10-20 PROCEDURE — 99214 PR OFFICE/OUTPT VISIT, EST, LEVL IV, 30-39 MIN: ICD-10-PCS | Mod: S$GLB,,, | Performed by: INTERNAL MEDICINE

## 2022-10-20 PROCEDURE — 4010F PR ACE/ARB THEARPY RXD/TAKEN: ICD-10-PCS | Mod: CPTII,S$GLB,, | Performed by: INTERNAL MEDICINE

## 2022-10-20 PROCEDURE — 4010F ACE/ARB THERAPY RXD/TAKEN: CPT | Mod: CPTII,S$GLB,, | Performed by: INTERNAL MEDICINE

## 2022-10-20 RX ORDER — PANTOPRAZOLE SODIUM 40 MG/1
40 TABLET, DELAYED RELEASE ORAL 2 TIMES DAILY
Qty: 60 TABLET | Refills: 11 | Status: SHIPPED | OUTPATIENT
Start: 2022-10-20 | End: 2023-05-02 | Stop reason: SDUPTHER

## 2022-10-20 NOTE — PROGRESS NOTES
[facility]   Clinic Note              Patient Name: Codi Wilkerson  Age: 41 y.o.  Sex: female  MRN: [id number]    TODAY'S DATE:  10/20/2022  REFERRING PROVIDER:  Self, Aaareferral     Diagnosis:   1. Dyspepsia    2. Lower abdominal pain        HPI:  Codi Wilkerson is a 41 y.o. female with a history of GERD, DM and IBS-C who was referred for evaluation and treatment of nausea/epigastric pain.    For review, patient seen by various gastroenterology providers in the past, most recently last year by JACOB noting once a week bowel movements with abdominal pain.  She had previously tried miralax and colace, as well as once tried her mother's linzess 145 mcg once which helped. The treatment provided mild relief.   Recommendation was made for colonoscopy, linzess, increase H20/exercise and fiber supplementation.    Today, patient notes epigastric burning and nausea for a few weeks now.  A few episoedes of emesis, no dysphagia.  Does not take NSAID.  No antacids.  Occasionally feels heartburn (1x/week).  No alcohol/tobacco. C-sections and hysterectomy.      PRIOR ENDOSCOPY:  -Colonoscopy: 2021:  Findings:        The entire examined colon appeared normal to cecum, including        terminal ileum.     -EGD - --      ROS: Negative other than above      Review of patient's allergies indicates:   Allergen Reactions    Indomethacin Rash    Morphine Itching       Outpatient Medications Marked as Taking for the 10/20/22 encounter (Office Visit) with Maycol Mooney MD   Medication Sig Dispense Refill    blood sugar diagnostic Strp To check BG one times daily, to use with insurance preferred meter 200 strip 3    blood-glucose meter kit To check BG one times daily, to use with insurance preferred meter 1 each 0    clonazePAM (KLONOPIN) 0.5 MG tablet Take 0.5 mg by mouth 2 (two) times daily as needed for Anxiety.      dulaglutide (TRULICITY) 3 mg/0.5 mL pen injector Inject 3 mg into the skin every 7 days. 4 pen 7     empagliflozin (JARDIANCE) 10 mg tablet Take 1 tablet (10 mg total) by mouth once daily. 90 tablet 2    ergocalciferol (ERGOCALCIFEROL) 50,000 unit Cap Take 1 capsule (50,000 Units total) by mouth every 7 days. 12 capsule 3    fenofibrate micronized (LOFIBRA) 134 MG Cap TAKE 1 CAPSULE BY MOUTH ONCE DAILY 90 capsule 3    lancets Misc To check BG one times daily, to use with insurance preferred meter 200 each 3    linaCLOtide (LINZESS) 145 mcg Cap capsule Take 1 capsule (145 mcg total) by mouth once daily. Take >30 minutes before 1st meal of the day. 30 capsule 4    losartan (COZAAR) 25 MG tablet Take 1 tablet (25 mg total) by mouth once daily. 90 tablet 3    lurasidone (LATUDA) 80 mg Tab tablet Take 80 mg by mouth once daily.      meloxicam (MOBIC) 15 MG tablet Take 1 tablet (15 mg total) by mouth once daily. 30 tablet 0    ondansetron (ZOFRAN-ODT) 4 MG TbDL Take 2 tablets (8 mg total) by mouth every 12 (twelve) hours. 60 tablet 6    promethazine (PHENERGAN) 12.5 MG Tab Take 1 tablet (12.5 mg total) by mouth 2 (two) times daily as needed (nausea). 20 tablet 0    traZODone (DESYREL) 50 MG tablet TAKE 1/2 TO 1 TABLET BY MOUTH ONCE DAILY AT BEDTIME         Past Medical History:   Diagnosis Date    Anxiety and depression     psychiatrist Dr. Casanova    Bilateral ovarian cysts 2017    Diabetes mellitus 2014    type 2    Elevated CEA 2017    Female pelvic pain 2017    GERD (gastroesophageal reflux disease)     HLD (hyperlipidemia)     Lesion of right lung 2017    Migraine headache     Right ovarian cyst 2017       Past Surgical History:   Procedure Laterality Date     SECTION, LOW TRANSVERSE  ,     COLONOSCOPY N/A 2017    Procedure: COLONOSCOPY;  Surgeon: Phill Valencia MD;  Location: [address];  Service: Endoscopy;  Laterality: N/A; unremarkable, Repeat colonoscopy at regular screening age for screening purposes    COLONOSCOPY N/A 2021    Procedure: COLONOSCOPY;   Surgeon: Kevin Conner MD;  Location: [facility];  Service: Endoscopy;  Laterality: N/A;    ECTOPIC PREGNANCY SURGERY  2003    laparotomy     HYSTERECTOMY  2009    supracervical LH.     PARTIAL HYSTERECTOMY  3 years ago    SALPINGECTOMY Right 2003       Family History   Problem Relation Age of Onset    Depression Mother     Migraines Mother     Thyroid nodules Mother         benign    Hypertension Father     Hyperlipidemia Father     Hypertension Brother     Heart disease Maternal Grandmother     Heart disease Maternal Grandfather     Hypertension Maternal Grandfather     Colon cancer Neg Hx     Esophageal cancer Neg Hx     Crohn's disease Neg Hx     Irritable bowel syndrome Neg Hx     Stomach cancer Neg Hx     Rectal cancer Neg Hx     Ulcerative colitis Neg Hx     Celiac disease Neg Hx        Social History     Socioeconomic History    Marital status:    Tobacco Use    Smoking status: Former     Packs/day: 1.50     Years: 7.00     Pack years: 10.50     Types: Cigarettes     Quit date: 12/1/2018     Years since quitting: 3.8    Smokeless tobacco: Never   Substance and Sexual Activity    Alcohol use: Yes     Alcohol/week: 2.5 standard drinks     Types: 3 Standard drinks or equivalent per week     Comment: Just during special occasions    Drug use: No    Sexual activity: Yes     Partners: Male     Birth control/protection: None     Social Determinants of Health     Financial Resource Strain: Medium Risk    Difficulty of Paying Living Expenses: Somewhat hard   Food Insecurity: No Food Insecurity    Worried About Running Out of Food in the Last Year: Never true    Ran Out of Food in the Last Year: Never true   Transportation Needs: No Transportation Needs    Lack of Transportation (Medical): No    Lack of Transportation (Non-Medical): No   Physical Activity: Inactive    Days of Exercise per Week: 0 days    Minutes of Exercise per Session: 0 min   Stress: Stress Concern Present    Feeling of Stress : To some  "extent   Social Connections: Unknown    Frequency of Communication with Friends and Family: More than three times a week    Frequency of Social Gatherings with Friends and Family: Once a week    Active Member of Clubs or Organizations: No    Attends Club or Organization Meetings: Never    Marital Status:    Housing Stability: Low Risk     Unable to Pay for Housing in the Last Year: No    Number of Places Lived in the Last Year: 1    Unstable Housing in the Last Year: No         Vital Signs:  /84   Pulse 80   Ht 5' 4" (1.626 m)   Wt 82 kg (180 lb 12.4 oz)   LMP 02/12/2009   BMI 31.03 kg/m²      General: Awoke and orientedx3, in no acute distress  HEENT: Normocephalic, atruamatic, Moist mucous membranes  CV: Regular rate and rhythm, no JVD  Pulm: Normal inspiratory effort, no audible wheezing  Abdomen: Soft, nontender, nondistended abdomen without rebound or guarding  Extremities: No clubbing, cyanosis or edema  Psych: Normal affect. Good eye contact     Labs:   No results found for: CRP, CALPROTECTIN  No results found for: HEPBSAG, HEPBCAB  No results found for: TBGOLDPLUS  Lab Results   Component Value Date    MTXLCLRZ80WT 15 (L) 02/04/2020    HDQTUBNH99 531 02/04/2020     Lab Results   Component Value Date    WBC 6.41 02/04/2020    HGB 14.7 02/04/2020    HCT 43.9 02/04/2020    MCV 90 02/04/2020     02/04/2020     Lab Results   Component Value Date    CREATININE 0.8 05/01/2020    ALBUMIN 4.2 05/01/2020    BILITOT 0.4 05/01/2020    ALKPHOS 61 05/01/2020    AST 33 05/01/2020    ALT 53 (H) 05/01/2020       Assessment/Plan:  Codi Wilkerson is a 41 y.o. female with a history of GERD, DM and IBS-C who was referred for evaluation and treatment of nausea/epigastric pain.    # Dyspepsia [R10.13]    -- begin pantoprazole 40mg BID  -- EGD to further evaluate    RTC 2-3 mo    Thank you for involving us in the care of this patient.        Maycol Mooney MD  Department of Gastroenterology & " Hepatology

## 2022-11-28 ENCOUNTER — CLINICAL SUPPORT (OUTPATIENT)
Dept: ENDOSCOPY | Facility: HOSPITAL | Age: 42
End: 2022-11-28
Attending: INTERNAL MEDICINE
Payer: COMMERCIAL

## 2022-11-28 VITALS — HEIGHT: 64 IN | BODY MASS INDEX: 31.07 KG/M2 | WEIGHT: 182 LBS

## 2022-11-28 DIAGNOSIS — R10.13 DYSPEPSIA: ICD-10-CM

## 2022-12-13 ENCOUNTER — ANESTHESIA EVENT (OUTPATIENT)
Dept: ENDOSCOPY | Facility: HOSPITAL | Age: 42
End: 2022-12-13
Payer: COMMERCIAL

## 2022-12-13 DIAGNOSIS — M75.102 UNSPECIFIED ROTATOR CUFF TEAR OR RUPTURE OF LEFT SHOULDER, NOT SPECIFIED AS TRAUMATIC: Primary | ICD-10-CM

## 2022-12-13 RX ORDER — SODIUM CHLORIDE, SODIUM LACTATE, POTASSIUM CHLORIDE, CALCIUM CHLORIDE 600; 310; 30; 20 MG/100ML; MG/100ML; MG/100ML; MG/100ML
INJECTION, SOLUTION INTRAVENOUS CONTINUOUS
Status: CANCELLED | OUTPATIENT
Start: 2022-12-13

## 2022-12-13 RX ORDER — LIDOCAINE HYDROCHLORIDE 10 MG/ML
1 INJECTION, SOLUTION EPIDURAL; INFILTRATION; INTRACAUDAL; PERINEURAL ONCE
Status: CANCELLED | OUTPATIENT
Start: 2022-12-13 | End: 2022-12-13

## 2022-12-14 ENCOUNTER — HOSPITAL ENCOUNTER (OUTPATIENT)
Facility: HOSPITAL | Age: 42
Discharge: HOME | End: 2022-12-14
Attending: INTERNAL MEDICINE | Admitting: INTERNAL MEDICINE
Payer: COMMERCIAL

## 2022-12-14 ENCOUNTER — ANESTHESIA (OUTPATIENT)
Dept: ENDOSCOPY | Facility: HOSPITAL | Age: 42
End: 2022-12-14
Payer: COMMERCIAL

## 2022-12-14 VITALS
DIASTOLIC BLOOD PRESSURE: 81 MMHG | RESPIRATION RATE: 20 BRPM | HEART RATE: 69 BPM | OXYGEN SATURATION: 100 % | SYSTOLIC BLOOD PRESSURE: 110 MMHG | TEMPERATURE: 98 F

## 2022-12-14 DIAGNOSIS — R10.13 DYSPEPSIA: ICD-10-CM

## 2022-12-14 LAB — GLUCOSE SERPL-MCNC: 125 MG/DL (ref 70–110)

## 2022-12-14 PROCEDURE — 43239 PR EGD, FLEX, W/BIOPSY, SGL/MULTI: ICD-10-PCS | Mod: ,,, | Performed by: INTERNAL MEDICINE

## 2022-12-14 PROCEDURE — D9220A PRA ANESTHESIA: ICD-10-PCS | Mod: CRNA,,, | Performed by: NURSE ANESTHETIST, CERTIFIED REGISTERED

## 2022-12-14 PROCEDURE — 27201012 HC FORCEPS, HOT/COLD, DISP: Performed by: INTERNAL MEDICINE

## 2022-12-14 PROCEDURE — D9220A PRA ANESTHESIA: ICD-10-PCS | Mod: ANES,,, | Performed by: ANESTHESIOLOGY

## 2022-12-14 PROCEDURE — 43239 EGD BIOPSY SINGLE/MULTIPLE: CPT | Performed by: INTERNAL MEDICINE

## 2022-12-14 PROCEDURE — 37000008 HC ANESTHESIA 1ST 15 MINUTES: Performed by: INTERNAL MEDICINE

## 2022-12-14 PROCEDURE — 88305 TISSUE EXAM BY PATHOLOGIST: ICD-10-PCS | Mod: 26,,, | Performed by: PATHOLOGY

## 2022-12-14 PROCEDURE — 37000009 HC ANESTHESIA EA ADD 15 MINS: Performed by: INTERNAL MEDICINE

## 2022-12-14 PROCEDURE — D9220A PRA ANESTHESIA: Mod: ANES,,, | Performed by: ANESTHESIOLOGY

## 2022-12-14 PROCEDURE — 25000003 PHARM REV CODE 250: Performed by: NURSE ANESTHETIST, CERTIFIED REGISTERED

## 2022-12-14 PROCEDURE — 63600175 PHARM REV CODE 636 W HCPCS: Performed by: NURSE ANESTHETIST, CERTIFIED REGISTERED

## 2022-12-14 PROCEDURE — 88305 TISSUE EXAM BY PATHOLOGIST: CPT | Performed by: PATHOLOGY

## 2022-12-14 PROCEDURE — D9220A PRA ANESTHESIA: Mod: CRNA,,, | Performed by: NURSE ANESTHETIST, CERTIFIED REGISTERED

## 2022-12-14 PROCEDURE — 25000003 PHARM REV CODE 250: Performed by: INTERNAL MEDICINE

## 2022-12-14 PROCEDURE — 43239 EGD BIOPSY SINGLE/MULTIPLE: CPT | Mod: ,,, | Performed by: INTERNAL MEDICINE

## 2022-12-14 PROCEDURE — 88305 TISSUE EXAM BY PATHOLOGIST: CPT | Mod: 26,,, | Performed by: PATHOLOGY

## 2022-12-14 RX ORDER — LIDOCAINE HYDROCHLORIDE 20 MG/ML
INJECTION, SOLUTION EPIDURAL; INFILTRATION; INTRACAUDAL; PERINEURAL
Status: DISCONTINUED
Start: 2022-12-14 | End: 2022-12-14 | Stop reason: HOSPADM

## 2022-12-14 RX ORDER — PROPOFOL 10 MG/ML
VIAL (ML) INTRAVENOUS
Status: DISCONTINUED | OUTPATIENT
Start: 2022-12-14 | End: 2022-12-14

## 2022-12-14 RX ORDER — SODIUM CHLORIDE 9 MG/ML
INJECTION, SOLUTION INTRAVENOUS CONTINUOUS
Status: DISCONTINUED | OUTPATIENT
Start: 2022-12-14 | End: 2022-12-14 | Stop reason: HOSPADM

## 2022-12-14 RX ORDER — LIDOCAINE HYDROCHLORIDE 20 MG/ML
INJECTION INTRAVENOUS
Status: DISCONTINUED | OUTPATIENT
Start: 2022-12-14 | End: 2022-12-14

## 2022-12-14 RX ORDER — PROPOFOL 10 MG/ML
INJECTION, EMULSION INTRAVENOUS
Status: DISCONTINUED
Start: 2022-12-14 | End: 2022-12-14 | Stop reason: HOSPADM

## 2022-12-14 RX ADMIN — PROPOFOL 30 MG: 10 INJECTION, EMULSION INTRAVENOUS at 10:12

## 2022-12-14 RX ADMIN — PROPOFOL 20 MG: 10 INJECTION, EMULSION INTRAVENOUS at 10:12

## 2022-12-14 RX ADMIN — LIDOCAINE HYDROCHLORIDE 100 MG: 20 INJECTION, SOLUTION INTRAVENOUS at 10:12

## 2022-12-14 RX ADMIN — PROPOFOL 150 MG: 10 INJECTION, EMULSION INTRAVENOUS at 10:12

## 2022-12-14 RX ADMIN — SODIUM CHLORIDE: 0.9 INJECTION, SOLUTION INTRAVENOUS at 10:12

## 2022-12-14 NOTE — PROVATION PATIENT INSTRUCTIONS
Discharge Summary/Instructions after an Endoscopic Procedure  Patient Name: Codi Wilkerson  Patient MRN: [id number]  Patient YOB: 1980 Wednesday, December 14, 2022  Maycol Mooney MD  Dear patient,  As a result of recent federal legislation (The Federal Cures Act), you may   receive lab or pathology results from your procedure in your [facility]   account before your physician is able to contact you. Your physician or   their representative will relay the results to you with their   recommendations at their soonest availability.  Thank you,  RESTRICTIONS:  During your procedure today, you received medications for sedation.  These   medications may affect your judgment, balance and coordination.  Therefore,   for 24 hours, you have the following restrictions:   - DO NOT drive a car, operate machinery, make legal/financial decisions,   sign important papers or drink alcohol.    ACTIVITY:  Today: no heavy lifting, straining or running due to procedural   sedation/anesthesia.  The following day: return to full activity including work.  DIET:  Eat and drink normally unless instructed otherwise.     TREATMENT FOR COMMON SIDE EFFECTS:  - Mild abdominal pain, nausea, belching, bloating or excessive gas:  rest,   eat lightly and use a heating pad.  - Sore Throat: treat with throat lozenges and/or gargle with warm salt   water.  - Because air was used during the procedure, expelling large amounts of air   from your rectum or belching is normal.  - If a bowel prep was taken, you may not have a bowel movement for 1-3 days.    This is normal.  SYMPTOMS TO WATCH FOR AND REPORT TO YOUR PHYSICIAN:  1. Abdominal pain or bloating, other than gas cramps.  2. Chest pain.  3. Back pain.  4. Signs of infection such as: chills or fever occurring within 24 hours   after the procedure.  5. Rectal bleeding, which would show as bright red, maroon, or black stools.   (A tablespoon of blood from the rectum is not serious, especially  if   hemorrhoids are present.)  6. Vomiting.  7. Weakness or dizziness.  GO DIRECTLY TO THE NEAREST EMERGENCY ROOM IF YOU HAVE ANY OF THE FOLLOWING:      Difficulty breathing              Chills and/or fever over 101 F   Persistent vomiting and/or vomiting blood   Severe abdominal pain   Severe chest pain   Black, tarry stools   Bleeding- more than one tablespoon   Any other symptom or condition that you feel may need urgent attention  Your doctor recommends these additional instructions:  If any biopsies were taken, your doctors clinic will contact you in 1 to 2   weeks with any results.  - Discharge patient to home.   - Resume previous diet.   - Continue present medications.   - Await pathology results.   - Return to GI clinic as previously scheduled.  For questions, problems or results please call your physician - Maycol Mooney MD at Work:  [phone].  [facility] can be reached at [phone]     IF A COMPLICATION OR EMERGENCY SITUATION ARISES AND YOU ARE UNABLE TO REACH   YOUR PHYSICIAN - GO DIRECTLY TO THE EMERGENCY ROOM.  Maycol Mooney MD  12/14/2022 10:56:01 AM  This report has been verified and signed electronically.  Dear patient,  As a result of recent federal legislation (The Federal Cures Act), you may   receive lab or pathology results from your procedure in your [facility]   account before your physician is able to contact you. Your physician or   their representative will relay the results to you with their   recommendations at their soonest availability.  Thank you,  PROVATION

## 2022-12-14 NOTE — H&P
Short Stay Endoscopy   Pre-Procedure Note    PCP - Marie Ivey MD  Referring Physician - PRE-ADMIT, ENDO -[facility]  No address on file    Procedure - Endoscopy    ASA - per anesthesia  Mallampati - per anesthesia    HPI  42 y.o. female scheduled for endoscopy for evaluation of    1. Dyspepsia         Medical History:  has a past medical history of Anxiety and depression, Bilateral ovarian cysts (2017), Diabetes mellitus (), Elevated CEA (2017), Female pelvic pain (2017), GERD (gastroesophageal reflux disease), HLD (hyperlipidemia), Lesion of right lung (2017), Migraine headache, and Right ovarian cyst (2017).    Surgical History:  has a past surgical history that includes  section, low transverse (, ); Ectopic pregnancy surgery (); Partial hysterectomy (3 years ago); Salpingectomy (Right, ); Hysterectomy (); Colonoscopy (N/A, 2017); and Colonoscopy (N/A, 2021).    Family History: family history includes Depression in her mother; Heart disease in her maternal grandfather and maternal grandmother; Hyperlipidemia in her father; Hypertension in her brother, father, and maternal grandfather; Migraines in her mother; Thyroid nodules in her mother..    Social History:  reports that she quit smoking about 4 years ago. Her smoking use included cigarettes. She has a 10.50 pack-year smoking history. She has never used smokeless tobacco. She reports current alcohol use of about 2.5 standard drinks per week. She reports that she does not use drugs.    Review of patient's allergies indicates:   Allergen Reactions    Indomethacin Rash    Morphine Itching       Medications:   Medications Prior to Admission   Medication Sig Dispense Refill Last Dose    clonazePAM (KLONOPIN) 0.5 MG tablet Take 0.5 mg by mouth 2 (two) times daily as needed for Anxiety.   2022    dulaglutide (TRULICITY) 3 mg/0.5 mL pen injector Inject 3 mg into the skin every 7  days. 4 pen 7 12/13/2022    empagliflozin (JARDIANCE) 10 mg tablet Take 1 tablet (10 mg total) by mouth once daily. 90 tablet 2 12/13/2022    ergocalciferol (ERGOCALCIFEROL) 50,000 unit Cap Take 1 capsule (50,000 Units total) by mouth every 7 days. 12 capsule 3 12/13/2022    fenofibrate micronized (LOFIBRA) 134 MG Cap TAKE 1 CAPSULE BY MOUTH ONCE DAILY 90 capsule 3 12/13/2022    linaCLOtide (LINZESS) 145 mcg Cap capsule Take 1 capsule (145 mcg total) by mouth once daily. Take >30 minutes before 1st meal of the day. 30 capsule 4 12/13/2022    losartan (COZAAR) 25 MG tablet Take 1 tablet (25 mg total) by mouth once daily. 90 tablet 3 12/13/2022    lurasidone (LATUDA) 80 mg Tab tablet Take 80 mg by mouth once daily.   12/13/2022    meloxicam (MOBIC) 15 MG tablet Take 1 tablet (15 mg total) by mouth once daily. 30 tablet 0 12/13/2022    pantoprazole (PROTONIX) 40 MG tablet Take 1 tablet (40 mg total) by mouth 2 (two) times daily. 60 tablet 11 12/13/2022    traZODone (DESYREL) 50 MG tablet TAKE 1/2 TO 1 TABLET BY MOUTH ONCE DAILY AT BEDTIME   12/13/2022    blood sugar diagnostic Strp To check BG one times daily, to use with insurance preferred meter 200 strip 3     blood-glucose meter kit To check BG one times daily, to use with insurance preferred meter 1 each 0     lancets Misc To check BG one times daily, to use with insurance preferred meter 200 each 3     ondansetron (ZOFRAN-ODT) 4 MG TbDL Take 2 tablets (8 mg total) by mouth every 12 (twelve) hours. 60 tablet 6 Unknown    promethazine (PHENERGAN) 12.5 MG Tab Take 1 tablet (12.5 mg total) by mouth 2 (two) times daily as needed (nausea). 20 tablet 0 Unknown         Labs:  Lab Results   Component Value Date    WBC 6.41 02/04/2020    HGB 14.7 02/04/2020    HCT 43.9 02/04/2020     02/04/2020    CHOL 134 07/08/2022    TRIG 257 (H) 07/08/2022    HDL 21 (L) 07/08/2022    ALT 53 (H) 05/01/2020    AST 33 05/01/2020     05/01/2020    K 4.1 05/01/2020      05/01/2020    CREATININE 0.8 05/01/2020    BUN 12 05/01/2020    CO2 26 05/01/2020    TSH 1.811 02/04/2020    HGBA1C 7.1 (H) 07/08/2022       Risks and benefits of this endoscopic procedure, including but not limited to bleeding, inflammation, infection, perforation, and death, explained to the patient prior to procedure      Maycol Mooney MD

## 2022-12-14 NOTE — TRANSFER OF CARE
Anesthesia Transfer of Care Note    Patient: Codi Wilkerson    Procedure(s) Performed: Procedure(s) (LRB):  ESOPHAGOGASTRODUODENOSCOPY (EGD) (N/A)    Patient location: GI    Anesthesia Type: general    Transport from OR: Transported from OR on room air with adequate spontaneous ventilation    Post pain: adequate analgesia    Post assessment: no apparent anesthetic complications and tolerated procedure well    Post vital signs: stable    Level of consciousness: awake and alert    Nausea/Vomiting: no nausea/vomiting    Complications: none    Transfer of care protocol was followedComments: Placed pt on 2L NC when arrived      Last vitals:   Visit Vitals  /71 (BP Location: Left arm, Patient Position: Lying)   Pulse 83   Temp 36.4 °C (97.5 °F) (Temporal)   Resp 12   LMP 02/12/2009   SpO2 (!) 94%   Breastfeeding No

## 2022-12-14 NOTE — ANESTHESIA PREPROCEDURE EVALUATION
12/14/2022  Codi Wilkerson is a 42 y.o., female.      Pre-op Assessment     I have reviewed the Nursing Notes.       Review of Systems  Anesthesia Hx:  No problems with previous Anesthesia    Social:  Non-Smoker    Cardiovascular:   Denies Pacemaker.  Denies Hypertension.  Denies Valvular problems/Murmurs.  Denies MI.  Denies CAD.    Denies CABG/stent.  Denies Dysrhythmias.   Denies Angina.  Denies CHF. hyperlipidemia    Pulmonary:  Pulmonary Normal    Renal/:  Renal/ Normal     Hepatic/GI:   No Bowel Prep. GERD Denies Liver Disease. Denies Hepatitis.    Neurological:   Headaches    Endocrine:   Diabetes, type 2 Denies Hypothyroidism. Denies Hyperthyroidism.  Obesity / BMI > 30Denies Morbid Obesity / BMI > 40      Physical Exam  General: Well nourished, Cooperative, Alert and Oriented    Airway:  Mallampati: III   Mouth Opening: Small, but > 3cm  TM Distance: Normal  Tongue: Normal  Neck ROM: Normal ROM    Dental:  Intact        Anesthesia Plan  Type of Anesthesia, risks & benefits discussed:    Anesthesia Type: Gen Natural Airway  Intra-op Monitoring Plan: Standard ASA Monitors  Induction:  IV  Informed Consent: Informed consent signed with the Patient and all parties understand the risks and agree with anesthesia plan.  All questions answered.   ASA Score: 2  Day of Surgery Review of History & Physical: H&P Update referred to the surgeon/provider.    Ready For Surgery From Anesthesia Perspective.     .

## 2022-12-14 NOTE — PLAN OF CARE
Procedure and recovery complete. Patient awake and alert. MD and  at bedside. Procedure and findings discussed. Discharge instructions given. Patient and  verbalized understanding of instructions. Gait steady. Able to ambulate without assistance. Patient walked out accompanied by .

## 2022-12-16 LAB
FINAL PATHOLOGIC DIAGNOSIS: NORMAL
GROSS: NORMAL
Lab: NORMAL

## 2022-12-19 NOTE — ANESTHESIA POSTPROCEDURE EVALUATION
Anesthesia Post Evaluation    Patient: Codi Wilkerson    Procedure(s) Performed: Procedure(s) (LRB):  ESOPHAGOGASTRODUODENOSCOPY (EGD) (N/A)    Final Anesthesia Type: general      Patient location during evaluation: GI PACU  Patient participation: Yes- Able to Participate  Level of consciousness: awake and alert  Post-procedure vital signs: reviewed and stable  Pain management: adequate  Airway patency: patent    PONV status at discharge: No PONV  Anesthetic complications: no      Cardiovascular status: blood pressure returned to baseline and hemodynamically stable  Respiratory status: unassisted and spontaneous ventilation  Hydration status: euvolemic  Follow-up not needed.          Vitals Value Taken Time   /81 12/14/22 1126   Temp 36.4 °C (97.5 °F) 12/14/22 1056   Pulse 69 12/14/22 1126   Resp 20 12/14/22 1126   SpO2 100 % 12/14/22 1126         Event Time   Out of Recovery 11:26:00         Pain/Graciela Score: No data recorded

## 2022-12-21 ENCOUNTER — HOSPITAL ENCOUNTER (OUTPATIENT)
Dept: RADIOLOGY | Facility: HOSPITAL | Age: 42
Discharge: HOME | End: 2022-12-21
Attending: ORTHOPAEDIC SURGERY
Payer: COMMERCIAL

## 2022-12-21 DIAGNOSIS — M75.102 UNSPECIFIED ROTATOR CUFF TEAR OR RUPTURE OF LEFT SHOULDER, NOT SPECIFIED AS TRAUMATIC: ICD-10-CM

## 2022-12-21 PROCEDURE — 73221 MRI JOINT UPR EXTREM W/O DYE: CPT | Mod: 26,LT,, | Performed by: RADIOLOGY

## 2022-12-21 PROCEDURE — 73221 MRI JOINT UPR EXTREM W/O DYE: CPT | Mod: TC,LT

## 2022-12-21 PROCEDURE — 73221 MRI SHOULDER WITHOUT CONTRAST LEFT: ICD-10-PCS | Mod: 26,LT,, | Performed by: RADIOLOGY

## 2023-03-24 ENCOUNTER — TELEPHONE (OUTPATIENT)
Dept: FAMILY MEDICINE | Facility: CLINIC | Age: 43
End: 2023-03-24
Payer: COMMERCIAL

## 2023-03-24 DIAGNOSIS — E11.9 TYPE 2 DIABETES MELLITUS WITHOUT COMPLICATION, WITHOUT LONG-TERM CURRENT USE OF INSULIN: Primary | ICD-10-CM

## 2023-03-24 NOTE — TELEPHONE ENCOUNTER
Patient is a new Patient with Dr. Lacy would like lab orders in before scheduled appointment on 05/02/2023  Please advise

## 2023-03-24 NOTE — TELEPHONE ENCOUNTER
----- Message from Pamela Marsh sent at 3/24/2023 12:23 PM CDT -----  Regarding: Lab order request  .Type: Lab    Caller is requesting to schedule their Lab appointment prior to annual appointment.    Order is not listed in EPIC.  Please enter order and contact patient to schedule.    Name of Caller:self     Preferred Date and Time of Labs: Early morning     Date of EPP Appointment:before appt on 05/02/2023    Where would they like the lab performed? [facility]    Would the patient rather a call back or a response via [facility]?   Best Call Back Number: .[phone]      Additional Information:New Patient with Dr. Lacy would like lab orders in before scheduled appointment on 05/02/2023

## 2023-05-02 ENCOUNTER — OFFICE VISIT (OUTPATIENT)
Dept: FAMILY MEDICINE | Facility: CLINIC | Age: 43
End: 2023-05-02
Payer: COMMERCIAL

## 2023-05-02 ENCOUNTER — LAB VISIT (OUTPATIENT)
Dept: LAB | Facility: HOSPITAL | Age: 43
End: 2023-05-02
Attending: FAMILY MEDICINE
Payer: COMMERCIAL

## 2023-05-02 VITALS
DIASTOLIC BLOOD PRESSURE: 60 MMHG | HEIGHT: 64 IN | BODY MASS INDEX: 29.62 KG/M2 | HEART RATE: 67 BPM | WEIGHT: 173.5 LBS | TEMPERATURE: 98 F | SYSTOLIC BLOOD PRESSURE: 110 MMHG | OXYGEN SATURATION: 97 %

## 2023-05-02 DIAGNOSIS — R91.1 SOLITARY PULMONARY NODULE: ICD-10-CM

## 2023-05-02 DIAGNOSIS — Z00.00 ANNUAL PHYSICAL EXAM: Primary | ICD-10-CM

## 2023-05-02 DIAGNOSIS — E78.5 HYPERLIPIDEMIA, UNSPECIFIED HYPERLIPIDEMIA TYPE: ICD-10-CM

## 2023-05-02 DIAGNOSIS — F31.9 BIPOLAR AFFECTIVE DISORDER, REMISSION STATUS UNSPECIFIED: ICD-10-CM

## 2023-05-02 DIAGNOSIS — E55.9 VITAMIN D DEFICIENCY: ICD-10-CM

## 2023-05-02 DIAGNOSIS — Z23 NEED FOR PROPHYLACTIC VACCINATION AGAINST STREPTOCOCCUS PNEUMONIAE (PNEUMOCOCCUS): ICD-10-CM

## 2023-05-02 DIAGNOSIS — E11.9 TYPE 2 DIABETES MELLITUS WITHOUT COMPLICATION, WITHOUT LONG-TERM CURRENT USE OF INSULIN: ICD-10-CM

## 2023-05-02 DIAGNOSIS — K21.9 GASTROESOPHAGEAL REFLUX DISEASE, UNSPECIFIED WHETHER ESOPHAGITIS PRESENT: ICD-10-CM

## 2023-05-02 DIAGNOSIS — E78.2 MIXED HYPERLIPIDEMIA: ICD-10-CM

## 2023-05-02 LAB
ALBUMIN SERPL BCP-MCNC: 3.8 G/DL (ref 3.5–5.2)
ALP SERPL-CCNC: 59 U/L (ref 55–135)
ALT SERPL W/O P-5'-P-CCNC: 22 U/L (ref 10–44)
ANION GAP SERPL CALC-SCNC: 9 MMOL/L (ref 8–16)
AST SERPL-CCNC: 21 U/L (ref 10–40)
BASOPHILS # BLD AUTO: 0.07 K/UL (ref 0–0.2)
BASOPHILS NFR BLD: 0.7 % (ref 0–1.9)
BILIRUB SERPL-MCNC: 0.2 MG/DL (ref 0.1–1)
BUN SERPL-MCNC: 12 MG/DL (ref 6–20)
CALCIUM SERPL-MCNC: 9.1 MG/DL (ref 8.7–10.5)
CHLORIDE SERPL-SCNC: 109 MMOL/L (ref 95–110)
CHOLEST SERPL-MCNC: 128 MG/DL (ref 120–199)
CHOLEST/HDLC SERPL: 6.4 {RATIO} (ref 2–5)
CO2 SERPL-SCNC: 21 MMOL/L (ref 23–29)
CREAT SERPL-MCNC: 0.7 MG/DL (ref 0.5–1.4)
DIFFERENTIAL METHOD: ABNORMAL
EOSINOPHIL # BLD AUTO: 0.5 K/UL (ref 0–0.5)
EOSINOPHIL NFR BLD: 4.9 % (ref 0–8)
ERYTHROCYTE [DISTWIDTH] IN BLOOD BY AUTOMATED COUNT: 12.9 % (ref 11.5–14.5)
EST. GFR  (NO RACE VARIABLE): >60 ML/MIN/1.73 M^2
ESTIMATED AVG GLUCOSE: 126 MG/DL (ref 68–131)
GLUCOSE SERPL-MCNC: 157 MG/DL (ref 70–110)
HBA1C MFR BLD: 6 % (ref 4–5.6)
HCT VFR BLD AUTO: 45.4 % (ref 37–48.5)
HDLC SERPL-MCNC: 20 MG/DL (ref 40–75)
HDLC SERPL: 15.6 % (ref 20–50)
HGB BLD-MCNC: 14.5 G/DL (ref 12–16)
IMM GRANULOCYTES # BLD AUTO: 0.04 K/UL (ref 0–0.04)
IMM GRANULOCYTES NFR BLD AUTO: 0.4 % (ref 0–0.5)
LDLC SERPL CALC-MCNC: 31 MG/DL (ref 63–159)
LYMPHOCYTES # BLD AUTO: 2.1 K/UL (ref 1–4.8)
LYMPHOCYTES NFR BLD: 22.7 % (ref 18–48)
MCH RBC QN AUTO: 29.8 PG (ref 27–31)
MCHC RBC AUTO-ENTMCNC: 31.9 G/DL (ref 32–36)
MCV RBC AUTO: 93 FL (ref 82–98)
MONOCYTES # BLD AUTO: 0.7 K/UL (ref 0.3–1)
MONOCYTES NFR BLD: 6.9 % (ref 4–15)
NEUTROPHILS # BLD AUTO: 6 K/UL (ref 1.8–7.7)
NEUTROPHILS NFR BLD: 64.4 % (ref 38–73)
NONHDLC SERPL-MCNC: 108 MG/DL
NRBC BLD-RTO: 0 /100 WBC
PLATELET # BLD AUTO: 311 K/UL (ref 150–450)
PMV BLD AUTO: 9.6 FL (ref 9.2–12.9)
POTASSIUM SERPL-SCNC: 3.7 MMOL/L (ref 3.5–5.1)
PROT SERPL-MCNC: 6.7 G/DL (ref 6–8.4)
RBC # BLD AUTO: 4.86 M/UL (ref 4–5.4)
SODIUM SERPL-SCNC: 139 MMOL/L (ref 136–145)
TRIGL SERPL-MCNC: 385 MG/DL (ref 30–150)
TSH SERPL DL<=0.005 MIU/L-ACNC: 2.78 UIU/ML (ref 0.4–4)
WBC # BLD AUTO: 9.37 K/UL (ref 3.9–12.7)

## 2023-05-02 PROCEDURE — 80061 LIPID PANEL: CPT | Performed by: FAMILY MEDICINE

## 2023-05-02 PROCEDURE — 90677 PCV20 VACCINE IM: CPT | Mod: S$GLB,,, | Performed by: FAMILY MEDICINE

## 2023-05-02 PROCEDURE — 3078F PR MOST RECENT DIASTOLIC BLOOD PRESSURE < 80 MM HG: ICD-10-PCS | Mod: CPTII,S$GLB,, | Performed by: FAMILY MEDICINE

## 2023-05-02 PROCEDURE — 4010F ACE/ARB THERAPY RXD/TAKEN: CPT | Mod: CPTII,S$GLB,, | Performed by: FAMILY MEDICINE

## 2023-05-02 PROCEDURE — 1159F MED LIST DOCD IN RCRD: CPT | Mod: CPTII,S$GLB,, | Performed by: FAMILY MEDICINE

## 2023-05-02 PROCEDURE — 3074F PR MOST RECENT SYSTOLIC BLOOD PRESSURE < 130 MM HG: ICD-10-PCS | Mod: CPTII,S$GLB,, | Performed by: FAMILY MEDICINE

## 2023-05-02 PROCEDURE — 90471 IMMUNIZATION ADMIN: CPT | Mod: S$GLB,,, | Performed by: FAMILY MEDICINE

## 2023-05-02 PROCEDURE — 84443 ASSAY THYROID STIM HORMONE: CPT | Performed by: FAMILY MEDICINE

## 2023-05-02 PROCEDURE — 3074F SYST BP LT 130 MM HG: CPT | Mod: CPTII,S$GLB,, | Performed by: FAMILY MEDICINE

## 2023-05-02 PROCEDURE — 36415 COLL VENOUS BLD VENIPUNCTURE: CPT | Mod: PO | Performed by: FAMILY MEDICINE

## 2023-05-02 PROCEDURE — 99396 PR PREVENTIVE VISIT,EST,40-64: ICD-10-PCS | Mod: 25,S$GLB,, | Performed by: FAMILY MEDICINE

## 2023-05-02 PROCEDURE — 1159F PR MEDICATION LIST DOCUMENTED IN MEDICAL RECORD: ICD-10-PCS | Mod: CPTII,S$GLB,, | Performed by: FAMILY MEDICINE

## 2023-05-02 PROCEDURE — 80053 COMPREHEN METABOLIC PANEL: CPT | Performed by: FAMILY MEDICINE

## 2023-05-02 PROCEDURE — 99999 PR PBB SHADOW E&M-EST. PATIENT-LVL III: ICD-10-PCS | Mod: PBBFAC,,, | Performed by: FAMILY MEDICINE

## 2023-05-02 PROCEDURE — 3008F PR BODY MASS INDEX (BMI) DOCUMENTED: ICD-10-PCS | Mod: CPTII,S$GLB,, | Performed by: FAMILY MEDICINE

## 2023-05-02 PROCEDURE — 85025 COMPLETE CBC W/AUTO DIFF WBC: CPT | Performed by: FAMILY MEDICINE

## 2023-05-02 PROCEDURE — 4010F PR ACE/ARB THEARPY RXD/TAKEN: ICD-10-PCS | Mod: CPTII,S$GLB,, | Performed by: FAMILY MEDICINE

## 2023-05-02 PROCEDURE — 3078F DIAST BP <80 MM HG: CPT | Mod: CPTII,S$GLB,, | Performed by: FAMILY MEDICINE

## 2023-05-02 PROCEDURE — 99999 PR PBB SHADOW E&M-EST. PATIENT-LVL III: CPT | Mod: PBBFAC,,, | Performed by: FAMILY MEDICINE

## 2023-05-02 PROCEDURE — 3008F BODY MASS INDEX DOCD: CPT | Mod: CPTII,S$GLB,, | Performed by: FAMILY MEDICINE

## 2023-05-02 PROCEDURE — 1160F RVW MEDS BY RX/DR IN RCRD: CPT | Mod: CPTII,S$GLB,, | Performed by: FAMILY MEDICINE

## 2023-05-02 PROCEDURE — 99396 PREV VISIT EST AGE 40-64: CPT | Mod: 25,S$GLB,, | Performed by: FAMILY MEDICINE

## 2023-05-02 PROCEDURE — 83036 HEMOGLOBIN GLYCOSYLATED A1C: CPT | Performed by: FAMILY MEDICINE

## 2023-05-02 PROCEDURE — 90677 PNEUMOCOCCAL CONJUGATE VACCINE 20-VALENT: ICD-10-PCS | Mod: S$GLB,,, | Performed by: FAMILY MEDICINE

## 2023-05-02 PROCEDURE — 90471 PNEUMOCOCCAL CONJUGATE VACCINE 20-VALENT: ICD-10-PCS | Mod: S$GLB,,, | Performed by: FAMILY MEDICINE

## 2023-05-02 PROCEDURE — 1160F PR REVIEW ALL MEDS BY PRESCRIBER/CLIN PHARMACIST DOCUMENTED: ICD-10-PCS | Mod: CPTII,S$GLB,, | Performed by: FAMILY MEDICINE

## 2023-05-02 RX ORDER — ERGOCALCIFEROL 1.25 MG/1
50000 CAPSULE ORAL
Qty: 12 CAPSULE | Refills: 3 | Status: SHIPPED | OUTPATIENT
Start: 2023-05-02

## 2023-05-02 RX ORDER — FENOFIBRATE 134 MG/1
CAPSULE ORAL
Qty: 90 CAPSULE | Refills: 3 | Status: SHIPPED | OUTPATIENT
Start: 2023-05-02

## 2023-05-02 RX ORDER — PANTOPRAZOLE SODIUM 40 MG/1
40 TABLET, DELAYED RELEASE ORAL 2 TIMES DAILY
Qty: 180 TABLET | Refills: 3 | Status: SHIPPED | OUTPATIENT
Start: 2023-05-02 | End: 2024-05-01

## 2023-05-02 RX ORDER — LOSARTAN POTASSIUM 25 MG/1
25 TABLET ORAL DAILY
Qty: 90 TABLET | Refills: 3 | Status: SHIPPED | OUTPATIENT
Start: 2023-05-02 | End: 2024-05-01

## 2023-05-02 NOTE — PROGRESS NOTES
"Routine Office Visit    Codi Wilkerson  1980  [id number]      Subjective     Codi is a 42 y.o. female who presents today for:    Annual exam / establish care / new to me   Diabetes - Patient was interested in changing her trulicity to Mounjaro. She heard it was a new medication that worked better. She works at [employer]. She denies any side effect from current medication regimen. She was unable to tolerate metformin in the past due to it making her feel terrible. She continues to take jardiance and trulicity. She was seeing endocrine but would like to stay on the [employer].   Bipolar disorder - Patient sees psychiatrist. She is stable on current regimen.      Objective     Review of Systems   Constitutional:  Negative for chills and fever.   HENT:  Negative for congestion.    Eyes:  Negative for blurred vision.   Respiratory:  Negative for cough.    Cardiovascular:  Negative for chest pain.   Gastrointestinal:  Negative for abdominal pain, constipation, diarrhea, heartburn, nausea and vomiting.   Genitourinary:  Negative for dysuria.   Musculoskeletal:  Negative for myalgias.   Skin:  Negative for itching and rash.   Neurological:  Negative for dizziness and headaches.   Psychiatric/Behavioral:  Negative for depression.      /60 (BP Location: Left arm, Patient Position: Sitting, BP Method: Large (Manual))   Pulse 67   Temp 98.1 °F (36.7 °C) (Oral)   Ht 5' 4" (1.626 m)   Wt 78.7 kg (173 lb 8 oz)   LMP 02/12/2009   SpO2 97%   BMI 29.78 kg/m²   Physical Exam  Constitutional:       Appearance: She is well-developed.   HENT:      Head: Normocephalic and atraumatic.   Eyes:      Conjunctiva/sclera: Conjunctivae normal.      Pupils: Pupils are equal, round, and reactive to light.   Cardiovascular:      Rate and Rhythm: Normal rate and regular rhythm.      Heart sounds: Normal heart sounds. No murmur heard.    No friction rub. No gallop.   Pulmonary:      Effort: No respiratory distress.      " Breath sounds: Normal breath sounds.   Abdominal:      General: Bowel sounds are normal. There is no distension.      Palpations: Abdomen is soft.      Tenderness: There is no abdominal tenderness.   Musculoskeletal:         General: Normal range of motion.      Cervical back: Normal range of motion and neck supple.   Lymphadenopathy:      Cervical: No cervical adenopathy.   Skin:     General: Skin is warm.   Neurological:      Mental Status: She is alert and oriented to person, place, and time.           Assessment     Health Maintenance         Date Due Completion Date    Pneumococcal Vaccines (Age 0-64) (1 - PCV) Never done ---    TETANUS VACCINE Never done ---    Low Dose Statin Never done ---    COVID-19 Vaccine (4 - Booster for Pfizer series) 12/17/2021 10/22/2021    Eye Exam 04/12/2022 4/12/2021 (Done)    Override on 4/12/2021: Done (Dr. Crump  - april 2021)    Hemoglobin A1c 01/08/2023 7/8/2022    Foot Exam 03/11/2023 3/11/2022    Override on 2/3/2020: Done    Lipid Panel 07/08/2023 7/8/2022    Diabetes Urine Screening 07/09/2023 7/9/2022    Mammogram 08/10/2023 8/10/2022    Influenza Vaccine (Season Ended) 09/01/2023 10/8/2021    Override on 10/5/2020: Done (oct 2020 external)    Override on 10/1/2019: Done              Problem List Items Addressed This Visit          Psychiatric    Bipolar disorder  The current medical regimen is effective;  continue present plan and medications.          Pulmonary    Solitary pulmonary nodule    Relevant Orders    CT Chest Without Contrast  Reviewed medical history - noted nodule on chart.   Recommend repeat CT   Recommend to quit smoking          Cardiac/Vascular    Hyperlipidemia    Relevant Medications    fenofibrate micronized (LOFIBRA) 134 MG Cap  The current medical regimen is effective;  continue present plan and medications.          Endocrine    Type 2 diabetes mellitus without complication, without long-term current use of insulin - Primary    Overview      type 2  Consider change trulicty to mounjaro - dependent on labs            Relevant Medications    losartan (COZAAR) 25 MG tablet    empagliflozin (JARDIANCE) 10 mg tablet    Other Relevant Orders    COMPREHENSIVE METABOLIC PANEL    Hemoglobin A1C    Vitamin D deficiency    Relevant Medications    ergocalciferol (ERGOCALCIFEROL) 50,000 unit Cap  The current medical regimen is effective;  continue present plan and medications.          GI    Gastroesophageal reflux disease    Relevant Medications    pantoprazole (PROTONIX) 40 MG tablet  The current medical regimen is effective;  continue present plan and medications.        Other Visit Diagnoses       Annual physical exam  I addressed all major concerns as it related to health maintenance.  All were ordered and scheduled based on the patients wishes.  Any additional health maintenance will be readdressed at the next physical if declined or deferred by the patient.     Need for prophylactic vaccination against Streptococcus pneumoniae (pneumococcus)        Relevant Orders    (In Office Administered) Pneumococcal Conjugate Vaccine (20 Valent) (IM) (Completed)                  Follow up in about 6 months (around 11/2/2023), or if symptoms worsen or fail to improve.

## 2023-05-02 NOTE — Clinical Note
Please contact lab. Labs were done at 7 am today - still says pending collect. Want to make sure labs were drawn.

## 2023-05-08 ENCOUNTER — TELEPHONE (OUTPATIENT)
Dept: FAMILY MEDICINE | Facility: CLINIC | Age: 43
End: 2023-05-08
Payer: COMMERCIAL

## 2023-05-08 DIAGNOSIS — E11.9 TYPE 2 DIABETES MELLITUS WITHOUT COMPLICATION, WITHOUT LONG-TERM CURRENT USE OF INSULIN: Primary | ICD-10-CM

## 2023-05-08 NOTE — TELEPHONE ENCOUNTER
----- Message from Keri Stone sent at 5/8/2023 12:22 PM CDT -----  Regarding: self [phone]  Type: Patient Call Back    Who called: self     What is the request in detail: wanted to know if the provider has decided on which medication she will get based on her labs.    Can the clinic reply by [facility]?no    Would the patient rather a call back or a response via [facility]? Call back     Best call back number: [phone]    Pt is almost out of meds

## 2023-05-09 NOTE — TELEPHONE ENCOUNTER
Her a1c did decrease from 7.1 to 6.0   She can continue on trulicity   Does she want a refill on trulicity or try mounjaro?    Her triglycerides however increased. This has a lot to do with processed carbohydrates. She needs to decrease foods such as bread, pasta, rice, and junk foods

## 2023-05-09 NOTE — TELEPHONE ENCOUNTER
Mounjaro sent to [facility]   Looks like it needs a prior authorization which can be done through [facility]

## 2023-05-09 NOTE — TELEPHONE ENCOUNTER
Below information given to patient, verbalized   understanding . States she would like to try the janeth

## 2023-05-24 ENCOUNTER — TELEPHONE (OUTPATIENT)
Dept: FAMILY MEDICINE | Facility: CLINIC | Age: 43
End: 2023-05-24
Payer: COMMERCIAL

## 2023-06-05 ENCOUNTER — OFFICE VISIT (OUTPATIENT)
Dept: FAMILY MEDICINE | Facility: CLINIC | Age: 43
End: 2023-06-05
Payer: COMMERCIAL

## 2023-06-05 VITALS
DIASTOLIC BLOOD PRESSURE: 68 MMHG | HEIGHT: 64 IN | BODY MASS INDEX: 29.24 KG/M2 | WEIGHT: 171.31 LBS | HEART RATE: 91 BPM | OXYGEN SATURATION: 96 % | TEMPERATURE: 99 F | SYSTOLIC BLOOD PRESSURE: 118 MMHG

## 2023-06-05 DIAGNOSIS — F31.9 BIPOLAR AFFECTIVE DISORDER, REMISSION STATUS UNSPECIFIED: ICD-10-CM

## 2023-06-05 DIAGNOSIS — E11.9 TYPE 2 DIABETES MELLITUS WITHOUT COMPLICATION, WITHOUT LONG-TERM CURRENT USE OF INSULIN: ICD-10-CM

## 2023-06-05 DIAGNOSIS — B95.8 STAPH INFECTION: Primary | ICD-10-CM

## 2023-06-05 PROCEDURE — 3008F PR BODY MASS INDEX (BMI) DOCUMENTED: ICD-10-PCS | Mod: CPTII,S$GLB,, | Performed by: FAMILY MEDICINE

## 2023-06-05 PROCEDURE — 99999 PR PBB SHADOW E&M-EST. PATIENT-LVL V: ICD-10-PCS | Mod: PBBFAC,,, | Performed by: FAMILY MEDICINE

## 2023-06-05 PROCEDURE — 3044F HG A1C LEVEL LT 7.0%: CPT | Mod: CPTII,S$GLB,, | Performed by: FAMILY MEDICINE

## 2023-06-05 PROCEDURE — 99999 PR PBB SHADOW E&M-EST. PATIENT-LVL V: CPT | Mod: PBBFAC,,, | Performed by: FAMILY MEDICINE

## 2023-06-05 PROCEDURE — 1160F PR REVIEW ALL MEDS BY PRESCRIBER/CLIN PHARMACIST DOCUMENTED: ICD-10-PCS | Mod: CPTII,S$GLB,, | Performed by: FAMILY MEDICINE

## 2023-06-05 PROCEDURE — 1159F PR MEDICATION LIST DOCUMENTED IN MEDICAL RECORD: ICD-10-PCS | Mod: CPTII,S$GLB,, | Performed by: FAMILY MEDICINE

## 2023-06-05 PROCEDURE — 87070 CULTURE OTHR SPECIMN AEROBIC: CPT | Performed by: FAMILY MEDICINE

## 2023-06-05 PROCEDURE — 99214 OFFICE O/P EST MOD 30 MIN: CPT | Mod: S$GLB,,, | Performed by: FAMILY MEDICINE

## 2023-06-05 PROCEDURE — 1160F RVW MEDS BY RX/DR IN RCRD: CPT | Mod: CPTII,S$GLB,, | Performed by: FAMILY MEDICINE

## 2023-06-05 PROCEDURE — 3078F DIAST BP <80 MM HG: CPT | Mod: CPTII,S$GLB,, | Performed by: FAMILY MEDICINE

## 2023-06-05 PROCEDURE — 1159F MED LIST DOCD IN RCRD: CPT | Mod: CPTII,S$GLB,, | Performed by: FAMILY MEDICINE

## 2023-06-05 PROCEDURE — 4010F PR ACE/ARB THEARPY RXD/TAKEN: ICD-10-PCS | Mod: CPTII,S$GLB,, | Performed by: FAMILY MEDICINE

## 2023-06-05 PROCEDURE — 3074F PR MOST RECENT SYSTOLIC BLOOD PRESSURE < 130 MM HG: ICD-10-PCS | Mod: CPTII,S$GLB,, | Performed by: FAMILY MEDICINE

## 2023-06-05 PROCEDURE — 99214 PR OFFICE/OUTPT VISIT, EST, LEVL IV, 30-39 MIN: ICD-10-PCS | Mod: S$GLB,,, | Performed by: FAMILY MEDICINE

## 2023-06-05 PROCEDURE — 3044F PR MOST RECENT HEMOGLOBIN A1C LEVEL <7.0%: ICD-10-PCS | Mod: CPTII,S$GLB,, | Performed by: FAMILY MEDICINE

## 2023-06-05 PROCEDURE — 3078F PR MOST RECENT DIASTOLIC BLOOD PRESSURE < 80 MM HG: ICD-10-PCS | Mod: CPTII,S$GLB,, | Performed by: FAMILY MEDICINE

## 2023-06-05 PROCEDURE — 3074F SYST BP LT 130 MM HG: CPT | Mod: CPTII,S$GLB,, | Performed by: FAMILY MEDICINE

## 2023-06-05 PROCEDURE — 4010F ACE/ARB THERAPY RXD/TAKEN: CPT | Mod: CPTII,S$GLB,, | Performed by: FAMILY MEDICINE

## 2023-06-05 PROCEDURE — 3008F BODY MASS INDEX DOCD: CPT | Mod: CPTII,S$GLB,, | Performed by: FAMILY MEDICINE

## 2023-06-05 RX ORDER — SULFAMETHOXAZOLE AND TRIMETHOPRIM 800; 160 MG/1; MG/1
1 TABLET ORAL 2 TIMES DAILY
Qty: 20 TABLET | Refills: 0 | Status: SHIPPED | OUTPATIENT
Start: 2023-06-05 | End: 2023-06-15

## 2023-06-05 RX ORDER — FLUOXETINE 10 MG/1
10 CAPSULE ORAL
COMMUNITY
Start: 2023-05-17

## 2023-06-05 NOTE — PROGRESS NOTES
"Routine Office Visit    Codi Wilkerson  1980  [id number]      Subjective     Codi is a 42 y.o. female who presents today for:    Left lower abdomen wound / infection - started 6 days ago - Patient reports a burning pain while she was showering. When she looked down, she noticed purulent drainage coming from the wound. She now has a small open wound in her left groin area. It drains minimally. She has been using neosporin     Objective     Review of Systems   Constitutional:  Negative for chills and fever.   HENT:  Negative for congestion.    Eyes:  Negative for blurred vision.   Respiratory:  Negative for cough.    Cardiovascular:  Negative for chest pain.   Gastrointestinal:  Negative for abdominal pain, constipation, diarrhea, heartburn, nausea and vomiting.   Genitourinary:  Negative for dysuria.   Musculoskeletal:  Negative for myalgias.   Skin:  Negative for itching and rash.   Neurological:  Negative for dizziness and headaches.   Psychiatric/Behavioral:  Negative for depression.      /68   Pulse 91   Temp 98.5 °F (36.9 °C) (Oral)   Ht 5' 4" (1.626 m)   Wt 77.7 kg (171 lb 4.8 oz)   LMP 02/12/2009   SpO2 96%   BMI 29.40 kg/m²   Physical Exam  Constitutional:       Appearance: She is well-developed.   HENT:      Head: Normocephalic and atraumatic.   Eyes:      Conjunctiva/sclera: Conjunctivae normal.      Pupils: Pupils are equal, round, and reactive to light.   Cardiovascular:      Rate and Rhythm: Normal rate and regular rhythm.      Heart sounds: Normal heart sounds. No murmur heard.    No friction rub. No gallop.   Pulmonary:      Effort: No respiratory distress.      Breath sounds: Normal breath sounds.   Abdominal:      General: Bowel sounds are normal. There is no distension.      Palpations: Abdomen is soft.      Tenderness: There is no abdominal tenderness.   Musculoskeletal:         General: Normal range of motion.      Cervical back: Normal range of motion and neck supple. "   Lymphadenopathy:      Cervical: No cervical adenopathy.   Skin:     General: Skin is warm.          Neurological:      Mental Status: She is alert and oriented to person, place, and time.           Assessment     Health Maintenance         Date Due Completion Date    TETANUS VACCINE Never done ---    Low Dose Statin Never done ---    COVID-19 Vaccine (4 - Pfizer series) 12/17/2021 10/22/2021    Eye Exam 04/12/2022 4/12/2021 (Done)    Override on 4/12/2021: Done (Dr. Crump  - april 2021)    Diabetes Urine Screening 07/09/2023 7/9/2022    Mammogram 08/10/2023 8/10/2022    Influenza Vaccine (Season Ended) 09/01/2023 10/8/2021    Override on 10/5/2020: Done (oct 2020 external)    Override on 10/1/2019: Done    Hemoglobin A1c 11/02/2023 5/2/2023    Foot Exam 05/02/2024 5/2/2023 (Done)    Override on 5/2/2023: Done    Override on 2/3/2020: Done    Lipid Panel 05/02/2024 5/2/2023              Problem List Items Addressed This Visit          Psychiatric    Bipolar disorder    Relevant Medications    FLUoxetine 10 MG capsule       Endocrine    Type 2 diabetes mellitus without complication, without long-term current use of insulin    Overview     type 2         Relevant Orders    Microalbumin/Creatinine Ratio, Urine  The current medical regimen is effective;  continue present plan and medications.       Other Visit Diagnoses       Staph infection    -  Primary    Relevant Medications    sulfamethoxazole-trimethoprim 800-160mg (BACTRIM DS) 800-160 mg Tab    Other Relevant Orders    CULTURE, AEROBIC  (SPECIFY SOURCE)  F/u with culture and treat as indicated  Recommend to wash wound with wound cleanser / saline / vashe daily   Cover with gauze.   Monitor   Start antibiotic.   Common side effects of this medication were discussed with the patient. Questions regarding medications were discussed during this visit.                      Follow up if symptoms worsen or fail to improve.

## 2023-06-05 NOTE — PATIENT INSTRUCTIONS
Wash wound with wound cleanser or saline or Vashe   Cover with gauze or bordered gauze   Monitor

## 2023-06-07 LAB — BACTERIA SPEC AEROBE CULT: NORMAL

## 2023-06-16 RX ORDER — ONDANSETRON 4 MG/1
8 TABLET, ORALLY DISINTEGRATING ORAL EVERY 12 HOURS
Qty: 60 TABLET | Refills: 6 | Status: SHIPPED | OUTPATIENT
Start: 2023-06-16 | End: 2023-11-03

## 2023-06-16 NOTE — TELEPHONE ENCOUNTER
No care due was identified.  [facility] Embedded Care Due Messages. Reference number: [id number].   6/16/2023 9:29:36 AM CDT

## 2023-08-07 DIAGNOSIS — E11.9 TYPE 2 DIABETES MELLITUS WITHOUT COMPLICATION, WITHOUT LONG-TERM CURRENT USE OF INSULIN: ICD-10-CM

## 2023-08-07 NOTE — TELEPHONE ENCOUNTER
Care Due:                  Date            Visit Type   Department     Provider  --------------------------------------------------------------------------------                                [facility]                              PRIMARY      MED/ INTERNAL  Last Visit: 06-      CARE ([facility])   MED/ PEDS      Ivory Lacy                              [facility]                              PRIMARY      MED/ INTERNAL  Next Visit: 11-      CARE ([facility])   MED/ PEDS      Ivory Lacy                                                            Last  Test          Frequency    Reason                     Performed    Due Date  --------------------------------------------------------------------------------    HBA1C.......  6 months...  empagliflozin............  05-   10-    [facility] Embedded Care Due Messages. Reference number: [id number].   8/07/2023 8:02:17 AM CDT

## 2023-08-08 RX ORDER — TIRZEPATIDE 7.5 MG/.5ML
INJECTION, SOLUTION SUBCUTANEOUS
Qty: 4 PEN | Refills: 3 | Status: SHIPPED | OUTPATIENT
Start: 2023-08-08 | End: 2023-08-11

## 2023-08-08 NOTE — TELEPHONE ENCOUNTER
Refill Routing Note   Medication(s) are not appropriate for processing by [facility] for the following reason(s):      New or recently adjusted medication    [facility] action(s):  Defer Care Due:  Labs due              Appointments  past 12m or future 3m with PCP    Date Provider   Last Visit   6/5/2023 Ivory Lacy MD   Next Visit   11/3/2023 Ivory Lacy MD   ED visits in past 90 days: 0        Note composed:1:43 PM 08/08/2023

## 2023-08-11 ENCOUNTER — TELEPHONE (OUTPATIENT)
Dept: FAMILY MEDICINE | Facility: CLINIC | Age: 43
End: 2023-08-11
Payer: COMMERCIAL

## 2023-08-11 DIAGNOSIS — E11.9 TYPE 2 DIABETES MELLITUS WITHOUT COMPLICATION, WITHOUT LONG-TERM CURRENT USE OF INSULIN: Primary | ICD-10-CM

## 2023-08-11 NOTE — TELEPHONE ENCOUNTER
----- Message from Cynthia Carranza MA sent at 8/11/2023 12:10 PM CDT -----  Name of Who is Calling:SCOTT BENITEZ [[id number]]                What is the request in detail: Pt states the mg of tirzepatide (MOUNJARO) 7.5 mg/0.5 mL PnIj is on back order and wants to know can it get moved up to 10mg. Please assist.                Can the clinic reply by [facility]: No                What Number to Call Back if not in [facility]: [phone]

## 2023-09-07 ENCOUNTER — OFFICE VISIT (OUTPATIENT)
Dept: FAMILY MEDICINE | Facility: CLINIC | Age: 43
End: 2023-09-07
Payer: COMMERCIAL

## 2023-09-07 VITALS
HEART RATE: 103 BPM | BODY MASS INDEX: 28.72 KG/M2 | TEMPERATURE: 98 F | OXYGEN SATURATION: 94 % | WEIGHT: 167.31 LBS | DIASTOLIC BLOOD PRESSURE: 72 MMHG | SYSTOLIC BLOOD PRESSURE: 100 MMHG

## 2023-09-07 DIAGNOSIS — F41.9 ANXIETY AND DEPRESSION: ICD-10-CM

## 2023-09-07 DIAGNOSIS — F32.A ANXIETY AND DEPRESSION: ICD-10-CM

## 2023-09-07 DIAGNOSIS — Z12.31 ENCOUNTER FOR SCREENING MAMMOGRAM FOR BREAST CANCER: ICD-10-CM

## 2023-09-07 DIAGNOSIS — Z72.0 TOBACCO USE: ICD-10-CM

## 2023-09-07 DIAGNOSIS — E11.9 TYPE 2 DIABETES MELLITUS WITHOUT COMPLICATION, WITHOUT LONG-TERM CURRENT USE OF INSULIN: ICD-10-CM

## 2023-09-07 DIAGNOSIS — E78.2 MIXED HYPERLIPIDEMIA: ICD-10-CM

## 2023-09-07 DIAGNOSIS — R21 RASH IN ADULT: Primary | ICD-10-CM

## 2023-09-07 PROCEDURE — 99999 PR PBB SHADOW E&M-EST. PATIENT-LVL V: ICD-10-PCS | Mod: PBBFAC,,, | Performed by: NURSE PRACTITIONER

## 2023-09-07 PROCEDURE — 99999 PR PBB SHADOW E&M-EST. PATIENT-LVL V: CPT | Mod: PBBFAC,,, | Performed by: NURSE PRACTITIONER

## 2023-09-07 PROCEDURE — 3044F PR MOST RECENT HEMOGLOBIN A1C LEVEL <7.0%: ICD-10-PCS | Mod: CPTII,S$GLB,, | Performed by: NURSE PRACTITIONER

## 2023-09-07 PROCEDURE — 3078F PR MOST RECENT DIASTOLIC BLOOD PRESSURE < 80 MM HG: ICD-10-PCS | Mod: CPTII,S$GLB,, | Performed by: NURSE PRACTITIONER

## 2023-09-07 PROCEDURE — 3074F SYST BP LT 130 MM HG: CPT | Mod: CPTII,S$GLB,, | Performed by: NURSE PRACTITIONER

## 2023-09-07 PROCEDURE — 99214 PR OFFICE/OUTPT VISIT, EST, LEVL IV, 30-39 MIN: ICD-10-PCS | Mod: 25,S$GLB,, | Performed by: NURSE PRACTITIONER

## 2023-09-07 PROCEDURE — 99406 PR TOBACCO USE CESSATION INTERMEDIATE 3-10 MINUTES: ICD-10-PCS | Mod: S$GLB,,, | Performed by: NURSE PRACTITIONER

## 2023-09-07 PROCEDURE — 3074F PR MOST RECENT SYSTOLIC BLOOD PRESSURE < 130 MM HG: ICD-10-PCS | Mod: CPTII,S$GLB,, | Performed by: NURSE PRACTITIONER

## 2023-09-07 PROCEDURE — 99214 OFFICE O/P EST MOD 30 MIN: CPT | Mod: 25,S$GLB,, | Performed by: NURSE PRACTITIONER

## 2023-09-07 PROCEDURE — 99406 BEHAV CHNG SMOKING 3-10 MIN: CPT | Mod: S$GLB,,, | Performed by: NURSE PRACTITIONER

## 2023-09-07 PROCEDURE — 4010F PR ACE/ARB THEARPY RXD/TAKEN: ICD-10-PCS | Mod: CPTII,S$GLB,, | Performed by: NURSE PRACTITIONER

## 2023-09-07 PROCEDURE — 4010F ACE/ARB THERAPY RXD/TAKEN: CPT | Mod: CPTII,S$GLB,, | Performed by: NURSE PRACTITIONER

## 2023-09-07 PROCEDURE — 3008F PR BODY MASS INDEX (BMI) DOCUMENTED: ICD-10-PCS | Mod: CPTII,S$GLB,, | Performed by: NURSE PRACTITIONER

## 2023-09-07 PROCEDURE — 3044F HG A1C LEVEL LT 7.0%: CPT | Mod: CPTII,S$GLB,, | Performed by: NURSE PRACTITIONER

## 2023-09-07 PROCEDURE — 3078F DIAST BP <80 MM HG: CPT | Mod: CPTII,S$GLB,, | Performed by: NURSE PRACTITIONER

## 2023-09-07 PROCEDURE — 3008F BODY MASS INDEX DOCD: CPT | Mod: CPTII,S$GLB,, | Performed by: NURSE PRACTITIONER

## 2023-09-07 PROCEDURE — 1159F PR MEDICATION LIST DOCUMENTED IN MEDICAL RECORD: ICD-10-PCS | Mod: CPTII,S$GLB,, | Performed by: NURSE PRACTITIONER

## 2023-09-07 PROCEDURE — 1159F MED LIST DOCD IN RCRD: CPT | Mod: CPTII,S$GLB,, | Performed by: NURSE PRACTITIONER

## 2023-09-07 RX ORDER — HYDROCORTISONE 25 MG/G
CREAM TOPICAL 2 TIMES DAILY
Qty: 3.5 G | Refills: 1 | Status: SHIPPED | OUTPATIENT
Start: 2023-09-07 | End: 2023-10-13

## 2023-09-07 NOTE — PROGRESS NOTES
HPI     Chief Complaint:  Chief Complaint   Patient presents with    Rash       Codi Wilkerson is a 42 y.o. female with multiple medical diagnoses as listed in the medical history and problem list that presents for rash.  Pt is known to me with his her last appointment 6/5/2023.      Rash  This is a new problem. The current episode started in the past 7 days. Location: right buttocks. The rash is characterized by dryness, redness and burning. She was exposed to nothing. Pertinent negatives include no diarrhea, fever or shortness of breath. Past treatments include nothing. The treatment provided no relief. There is no history of allergies, asthma, eczema or varicella.       Assessment & Plan     Problem List Items Addressed This Visit          Psychiatric    Anxiety and depression    Encouraged the patient to perform self-calming techniques, such as deep breathing/relaxation techniques and exercise.    Reports mood is stable. Denies thoughts of self harm.      Overview     psychiatrist Dr. Casanova            Cardiac/Vascular    Hyperlipidemia    discussed ways to lower triglycerides such as cutting simple sugars out of diet (white breads, candies, cookies, cakes, etc.) and reducing/eliminating intake of highly processed trans fatty acids.   Exercise 30 minutes a day for 4-5 days a week.   Eat more fiber.         Endocrine    Type 2 diabetes mellitus without complication, without long-term current use of insulin    -discussed with patient about routine diabetic care that includes but are not limited to regular eye exams, skin care, daily foot exam, proper nutrition, regular BG monitoring at home (fasting and mealtime) and medication compliance in a diabetic.  Target morning BS  and meal-time BS <180      Overview     type 2          Other Visit Diagnoses       Rash in adult    -  Primary    New onset. Denies trigger. Rash located to panty line underneath right buttocks. Denies other associated symptoms.  Denies contact with new products. Denies discharge, bleeding, crusting.     Exam completed with chaperone LELA Harris MA. Exam was unremarkable. Small area of erythema noted to panty line under right buttocks.     Start medication as below    Discussed DDx, condition, and treatment.   Education sent to patient portal/included in after visit summary.  ED precautions given.   Notify provider if symptoms do not resolve or increase in severity.   Patient verbalizes understanding and agrees with plan of care.      Relevant Medications    hydrocortisone 2.5 % cream    Encounter for screening mammogram for breast cancer        Relevant Orders    Mammo Digital Screening Bilat    Tobacco use        -Counseled patient to quit tobacco usage, and advised on several options to quit and the benefits of quitting, which include but are not limited to: decreasing the risk of cancer, HTN, CVD, respiratory complications, among other diseases.  -5 minutes spent discussing cessation.   -pt is interested in quitting. But declined cessation products or referral to cessation. She prefers to use willpower.                   --------------------------------------------      Health Maintenance:  Health Maintenance         Date Due Completion Date    TETANUS VACCINE Never done ---    Low Dose Statin Never done ---    COVID-19 Vaccine (4 - Pfizer series) 12/17/2021 10/22/2021    Eye Exam 04/12/2022 4/12/2021 (Done)    Override on 4/12/2021: Done (Dr. Crump  - april 2021)    Mammogram 08/10/2023 8/10/2022    Influenza Vaccine (1) 09/01/2023 10/8/2021    Override on 10/5/2020: Done (oct 2020 external)    Override on 10/1/2019: Done    Diabetes Urine Screening 07/09/2023 7/9/2022    Hemoglobin A1c 11/02/2023 5/2/2023    Foot Exam 05/02/2024 5/2/2023 (Done)    Override on 5/2/2023: Done    Override on 2/3/2020: Done    Lipid Panel 05/02/2024 5/2/2023            Mammogram ordered and Advised patient on the importance of completing overdue health  maintenance items    Follow Up:  Follow up in about 2 weeks (around 9/21/2023), or if symptoms worsen or fail to improve.    Exam     Review of Systems:  (as noted above)  Review of Systems   Constitutional:  Negative for fever.   HENT:  Negative for trouble swallowing.    Eyes:  Negative for visual disturbance.   Respiratory:  Negative for chest tightness and shortness of breath.    Cardiovascular:  Negative for chest pain.   Gastrointestinal:  Negative for blood in stool and diarrhea.   Skin:  Positive for rash.       Physical Exam:   Physical Exam  Constitutional:       General: She is not in acute distress.     Appearance: She is not ill-appearing or diaphoretic.   HENT:      Head: Normocephalic and atraumatic.   Cardiovascular:      Rate and Rhythm: Normal rate and regular rhythm.      Heart sounds: No murmur heard.     No friction rub. No gallop.   Pulmonary:      Effort: No respiratory distress.   Chest:      Chest wall: No tenderness.   Musculoskeletal:      Cervical back: No rigidity.   Skin:     Capillary Refill: Capillary refill takes 2 to 3 seconds.   Neurological:      General: No focal deficit present.      Mental Status: She is alert and oriented to person, place, and time.       Vitals:    09/07/23 1043   BP: 100/72   BP Location: Right arm   Patient Position: Sitting   BP Method: Medium (Manual)   Pulse: 103   Temp: 98.4 °F (36.9 °C)   TempSrc: Oral   SpO2: (!) 94%   Weight: 75.9 kg (167 lb 5.3 oz)      Body mass index is 28.72 kg/m².        History     Past Medical History:  Past Medical History:   Diagnosis Date    Anxiety and depression     psychiatrist Dr. Casanova    Bilateral ovarian cysts 5/30/2017    Diabetes mellitus 2014    type 2    Elevated CEA 5/12/2017    Female pelvic pain 5/30/2017    GERD (gastroesophageal reflux disease)     HLD (hyperlipidemia)     Lesion of right lung 7/5/2017    Migraine headache     Right ovarian cyst 5/12/2017       Past Surgical History:  Past Surgical  History:   Procedure Laterality Date     SECTION, LOW TRANSVERSE  ,     COLONOSCOPY N/A 2017    Procedure: COLONOSCOPY;  Surgeon: Phill Valencia MD;  Location: [facility] ([address] [place] [address]);  Service: Endoscopy;  Laterality: N/A; unremarkable, Repeat colonoscopy at regular screening age for screening purposes    COLONOSCOPY N/A 2021    Procedure: COLONOSCOPY;  Surgeon: Kevin Conner MD;  Location: [facility];  Service: Endoscopy;  Laterality: N/A;    ECTOPIC PREGNANCY SURGERY      laparotomy     ESOPHAGOGASTRODUODENOSCOPY N/A 2022    Procedure: ESOPHAGOGASTRODUODENOSCOPY (EGD);  Surgeon: Maycol Mooney MD;  Location: [facility];  Service: Endoscopy;  Laterality: N/A;    HYSTERECTOMY  2009    supracervical LH.     PARTIAL HYSTERECTOMY  3 years ago    SALPINGECTOMY Right        Social History:  Social History     Socioeconomic History    Marital status:    Tobacco Use    Smoking status: Every Day     Current packs/day: 1.00     Average packs/day: 1 pack/day for 0.7 years (0.7 ttl pk-yrs)     Types: Cigarettes     Start date: 2023    Smokeless tobacco: Never   Substance and Sexual Activity    Alcohol use: Yes     Alcohol/week: 2.5 standard drinks of alcohol     Types: 3 Standard drinks or equivalent per week     Comment: Just during special occasions    Drug use: No    Sexual activity: Yes     Partners: Male     Birth control/protection: None     Social Determinants of Health     Financial Resource Strain: Medium Risk (2022)    Overall Financial Resource Strain (CARDIA)     Difficulty of Paying Living Expenses: Somewhat hard   Food Insecurity: No Food Insecurity (2022)    Hunger Vital Sign     Worried About Running Out of Food in the Last Year: Never true     Ran Out of Food in the Last Year: Never true   Transportation Needs: No Transportation Needs (2022)    PRAPARE - Transportation     Lack of Transportation (Medical): No     Lack of Transportation  (Non-Medical): No   Physical Activity: Inactive (7/7/2022)    Exercise Vital Sign     Days of Exercise per Week: 0 days     Minutes of Exercise per Session: 0 min   Stress: Stress Concern Present (7/7/2022)    [employer] - Occupational Stress Questionnaire     Feeling of Stress : To some extent   Social Connections: Unknown (7/7/2022)    Social Connection and Isolation Panel [NHANES]     Frequency of Communication with Friends and Family: More than three times a week     Frequency of Social Gatherings with Friends and Family: Once a week     Active Member of Clubs or Organizations: No     Attends Club or Organization Meetings: Never     Marital Status:    Housing Stability: Low Risk  (7/7/2022)    Housing Stability Vital Sign     Unable to Pay for Housing in the Last Year: No     Number of Places Lived in the Last Year: 1     Unstable Housing in the Last Year: No       Family History:  Family History   Problem Relation Age of Onset    Depression Mother     Migraines Mother     Thyroid nodules Mother         benign    Hypertension Father     Hyperlipidemia Father     Hypertension Brother     Heart disease Maternal Grandmother     Heart disease Maternal Grandfather     Hypertension Maternal Grandfather     Colon cancer Neg Hx     Esophageal cancer Neg Hx     Crohn's disease Neg Hx     Irritable bowel syndrome Neg Hx     Stomach cancer Neg Hx     Rectal cancer Neg Hx     Ulcerative colitis Neg Hx     Celiac disease Neg Hx        Allergies and Medications: (updated and reviewed)  Review of patient's allergies indicates:   Allergen Reactions    Indomethacin Rash    Morphine Itching     Current Outpatient Medications   Medication Sig Dispense Refill    blood sugar diagnostic Strp To check BG one times daily, to use with insurance preferred meter 200 strip 3    clonazePAM (KLONOPIN) 0.5 MG tablet Take 0.5 mg by mouth 2 (two) times daily as needed for Anxiety.      empagliflozin  (JARDIANCE) 10 mg tablet Take 1 tablet (10 mg total) by mouth once daily. 90 tablet 3    ergocalciferol (ERGOCALCIFEROL) 50,000 unit Cap Take 1 capsule (50,000 Units total) by mouth every 7 days. 12 capsule 3    fenofibrate micronized (LOFIBRA) 134 MG Cap TAKE 1 CAPSULE BY MOUTH ONCE DAILY 90 capsule 3    FLUoxetine 10 MG capsule Take 10 mg by mouth.      lancets Misc To check BG one times daily, to use with insurance preferred meter 200 each 3    losartan (COZAAR) 25 MG tablet Take 1 tablet (25 mg total) by mouth once daily. 90 tablet 3    lurasidone (LATUDA) 80 mg Tab tablet Take 80 mg by mouth once daily.      ondansetron (ZOFRAN-ODT) 4 MG TbDL Take 2 tablets (8 mg total) by mouth every 12 (twelve) hours. 60 tablet 6    pantoprazole (PROTONIX) 40 MG tablet Take 1 tablet (40 mg total) by mouth 2 (two) times daily. 180 tablet 3    tirzepatide 10 mg/0.5 mL PnIj Inject 10 mg into the skin every 7 days. 4 pen 5    traZODone (DESYREL) 50 MG tablet TAKE 1/2 TO 1 TABLET BY MOUTH ONCE DAILY AT BEDTIME      blood-glucose meter kit To check BG one times daily, to use with insurance preferred meter 1 each 0    hydrocortisone 2.5 % cream Apply topically 2 (two) times daily. 3.5 g 1     No current facility-administered medications for this visit.       Patient Care Team:  Ivory Lacy MD as PCP - General (Family Medicine)  Mely Escoto NP as Nurse Practitioner (Gastroenterology)  Norbert Hamilton LPN (Inactive) as Care Coordinator         - The patient is given an After Visit Summary that lists all medications with directions, allergies, education, orders placed during this encounter and follow-up instructions.      - I have reviewed the patient's medical information including past medical, family, and social history sections including the medications and allergies.      - We discussed the patient's current medications.     This note was created by combination of typed  and MModal dictation.  Transcription  errors may be present.  If there are any questions, please contact me.       Sung Smith NP

## 2023-09-07 NOTE — PATIENT INSTRUCTIONS
[facility]--[phone]  Imaging, Xray, CT, MRI, Ultrasound---[phone]  Bariatrics---[phone]  Breast Surgery---[phone]  Case Management---[phone]  Colonoscopy---[phone]  DME---[phone]  Infectious Disease---[phone]  Interventional Radiology---[phone]  Medical Records---[phone]  [facility]---[phone]  Optometry/Ophthalmology---[phone]  [facility]---[phone]  Physical Therapy---[phone]  Psychiatry---[phone] or [phone]  Plastic Surgery---[phone]  Recovery--[phone] option 2, or [phone].  Sleep Study---[phone]  Smoking Cessation---[phone]  Wound Care---[phone]  Referral Desk---[phone]

## 2023-09-15 ENCOUNTER — OFFICE VISIT (OUTPATIENT)
Dept: URGENT CARE | Facility: CLINIC | Age: 43
End: 2023-09-15
Payer: COMMERCIAL

## 2023-09-15 VITALS
HEIGHT: 64 IN | BODY MASS INDEX: 28.51 KG/M2 | DIASTOLIC BLOOD PRESSURE: 76 MMHG | HEART RATE: 97 BPM | OXYGEN SATURATION: 95 % | RESPIRATION RATE: 18 BRPM | TEMPERATURE: 99 F | WEIGHT: 167 LBS | SYSTOLIC BLOOD PRESSURE: 106 MMHG

## 2023-09-15 DIAGNOSIS — R05.9 COUGH, UNSPECIFIED TYPE: ICD-10-CM

## 2023-09-15 DIAGNOSIS — U07.1 COVID: Primary | ICD-10-CM

## 2023-09-15 LAB
CTP QC/QA: YES
SARS-COV-2 AG RESP QL IA.RAPID: POSITIVE

## 2023-09-15 PROCEDURE — 87811 SARS-COV-2 COVID19 W/OPTIC: CPT | Mod: QW,S$GLB,,

## 2023-09-15 PROCEDURE — 87811 SARS CORONAVIRUS 2 ANTIGEN POCT, MANUAL READ: ICD-10-PCS | Mod: QW,S$GLB,,

## 2023-09-15 PROCEDURE — 99213 PR OFFICE/OUTPT VISIT, EST, LEVL III, 20-29 MIN: ICD-10-PCS | Mod: S$GLB,,,

## 2023-09-15 PROCEDURE — 99213 OFFICE O/P EST LOW 20 MIN: CPT | Mod: S$GLB,,,

## 2023-09-15 RX ORDER — BENZONATATE 200 MG/1
200 CAPSULE ORAL 3 TIMES DAILY PRN
Qty: 30 CAPSULE | Refills: 0 | Status: SHIPPED | OUTPATIENT
Start: 2023-09-15 | End: 2023-09-25

## 2023-09-15 RX ORDER — PROMETHAZINE HYDROCHLORIDE AND DEXTROMETHORPHAN HYDROBROMIDE 6.25; 15 MG/5ML; MG/5ML
5 SYRUP ORAL EVERY 6 HOURS PRN
Qty: 118 ML | Refills: 0 | Status: SHIPPED | OUTPATIENT
Start: 2023-09-15 | End: 2023-09-25

## 2023-09-15 RX ORDER — ONDANSETRON 4 MG/1
4 TABLET, ORALLY DISINTEGRATING ORAL EVERY 8 HOURS PRN
Qty: 6 TABLET | Refills: 0 | Status: SHIPPED | OUTPATIENT
Start: 2023-09-15

## 2023-09-15 NOTE — PATIENT INSTRUCTIONS
Please quarantine for 5 days from the onset of symptoms, please wear a mask for 5 days following your quarantine.     Please take MOLNUPIRAVIR for COVID.   Please use MAGIC MOUTHWASH for sore throat relief.  Please take TESSALON during the day for cough.  Please take PROMETHAZINE DM at night for cough.   You were prescribed Promethazine DM, this medication can make you drowsy, please avoid driving or operating heavy machinery while taking this medication.   Please do not combine ZOFRAN and PROMETHAZINE DM for they have similar effects.    Please consider taking over the counter ZYRTEC (CETRIZINE) for nasal drip.  Please take ZOFRAN for nausea/vomiting.     Please consider Pedialyte and/or Gatorade/Powerade for hydration.  Please avoid dairy, spicy foods, greasy foods for symptom relief.  Please slow reintroduce your diet in the following pattern:   Liquids only, if you are able to tolerate, please move to the next step.   Soft foods only, if you are able to tolerate, please move to the next step.   [place] food only, if you are able to tolerate, please move to the next step.   Regular diet    Please return here or go to the Emergency Department for any concerns or worsening of condition.    Please drink plenty of fluids.  Please get plenty of rest.  Please utilize saltwater gargles for sore throat.  Please utilize warm tea, and lemon for sore throat relief.  Please utilize over-the-counter throat numbing spray and lozenges for sore throat relief.    If you do not have Hypertension or any history of palpitations, it is ok to take over the counter Sudafed or Mucinex D or Allegra-D or Claritin-D or Zyrtec-D.  If you do take one of the above, it is ok to combine that with plain over the counter Mucinex or Allegra or Claritin or Zyrtec.  If for example you are taking Zyrtec -D, you can combine that with Mucinex, but not Mucinex-D.  If you are taking Mucinex-D, you can combine that with plain Allegra or Claritin or Zyrtec.    If you do have Hypertension or palpitations, it is safe to take Coricidin HBP for relief of sinus symptoms.  We recommend you take over the counter Flonase (Fluticasone) or another nasally inhaled steroid unless you are already taking one.  Nasal irrigation with a saline spray or Netti Pot like device per their directions is also recommended.  If not allergic, please take over the counter Tylenol (Acetaminophen) and/or Motrin (Ibuprofen) as directed for control of pain and/or fever.  Please follow up with your primary care doctor or specialist as needed.    If you  smoke, please stop smoking.

## 2023-09-15 NOTE — PROGRESS NOTES
"Subjective:      Patient ID: Codi Wilkerson is a 42 y.o. female.    Vitals:  height is 5' 4" (1.626 m) and weight is 75.8 kg (167 lb). Her oral temperature is 98.7 °F (37.1 °C). Her blood pressure is 106/76 and her pulse is 97. Her respiration is 18 and oxygen saturation is 95%.     Chief Complaint: Cough    Patient presents with a cough.  She says that the cough is productive of mucus.  Associated symptoms include congestion, headache, left ear pain.  Patient states that her symptoms started 5 days ago.  Patient has tried DayQuil, Zyrtec, ibuprofen with mild relief of symptoms.  Patient states that she took an at home COVID test and it was negative.  She denies fever, chills, chest pain, SOB, vomiting, abdominal pain.    Cough  This is a new problem. The current episode started in the past 7 days. The problem has been unchanged. The problem occurs constantly. The cough is Productive of sputum. Associated symptoms include ear congestion, headaches, myalgias, nasal congestion, postnasal drip and a sore throat. Pertinent negatives include no chest pain, chills, ear pain, fever, heartburn, hemoptysis, rash, rhinorrhea, shortness of breath, sweats, weight loss or wheezing. Nothing aggravates the symptoms. Treatments tried: otc medications. The treatment provided no relief.       Constitution: Negative for chills and fever.   HENT:  Positive for postnasal drip and sore throat. Negative for ear pain.    Cardiovascular:  Negative for chest pain and sob on exertion.   Respiratory:  Positive for cough and sputum production. Negative for bloody sputum, shortness of breath and wheezing.    Gastrointestinal:  Positive for nausea. Negative for abdominal pain, vomiting, diarrhea and heartburn.   Musculoskeletal:  Positive for muscle ache.   Skin:  Negative for rash.   Neurological:  Positive for headaches.      Objective:     Physical Exam   Constitutional:  Non-toxic appearance. She does not appear ill. No distress.      " Comments:Patient is in no acute distress, patient is non-toxic appearing, patient is ox3, patient is answering question appropriately.   normal  HENT:   Head: Normocephalic.   Ears:   Right Ear: Tympanic membrane, external ear and ear canal normal. impacted cerumen  Left Ear: Tympanic membrane, external ear and ear canal normal. impacted cerumen  Nose: Congestion present. No rhinorrhea.   Mouth/Throat: Posterior oropharyngeal erythema present. No oropharyngeal exudate. Oropharynx is clear.   No drooling, no tripoding. Patient is able to handle secretions. Patient appears to be in no acute respiratory distress. Airway is intact. Patient is able to talk in complete sentences without discomfort.      Comments: No drooling, no tripoding. Patient is able to handle secretions. Patient appears to be in no acute respiratory distress. Airway is intact. Patient is able to talk in complete sentences without discomfort.  Cardiovascular: Normal rate, regular rhythm and normal heart sounds.   No murmur heard.Exam reveals no gallop and no friction rub.   Pulmonary/Chest: Effort normal. No stridor. No respiratory distress. She has no wheezes. She has no rhonchi. She has no rales. She exhibits no tenderness.         Comments: Patient is in no respiratory distress. Breath sounds even, unlabored, and clear to auscultation bilaterally. No accessory muscle usage. Patient able to speak in complete sentences with ease.    Abdominal: Normal appearance and bowel sounds are normal. She exhibits no distension and no mass. Soft. There is no abdominal tenderness. There is no rebound, no guarding, no left CVA tenderness and no right CVA tenderness. No hernia.   Lymphadenopathy:     She has cervical adenopathy.   Neurological: She is alert.   Skin: Skin is not diaphoretic.   Nursing note and vitals reviewed.    Results for orders placed or performed in visit on 09/15/23   SARS Coronavirus 2 Antigen, POCT Manual Read   Result Value Ref Range     SARS Coronavirus 2 Antigen Positive (A) Negative     Acceptable Yes      Assessment:     1. COVID    2. Cough, unspecified type      Plan:   Previous notes reviewed.  Vital signs reviewed.  Labs ordered. Labs reviewed.  Discussed COVID, home care, tx options, and given follow up precautions.  Patient was briefed on my thought process and diagnosis.   Patient involved with the treatment plan and agreed to the plan.  Patient informed on warning signs, patient understood warning signs and to go to urgent care or ER if warning signs appear.  Please excuse grammatical/spelling errors appreciated throughout this visit encounter for a remote dictation device was used during this encounter.    Patient Instructions   Please quarantine for 5 days from the onset of symptoms, please wear a mask for 5 days following your quarantine.     Please take MOLNUPIRAVIR for COVID.   Please use MAGIC MOUTHWASH for sore throat relief.  Please take TESSALON during the day for cough.  Please take PROMETHAZINE DM at night for cough.   You were prescribed Promethazine DM, this medication can make you drowsy, please avoid driving or operating heavy machinery while taking this medication.   Please do not combine ZOFRAN and PROMETHAZINE DM for they have similar effects.    Please consider taking over the counter ZYRTEC (CETRIZINE) for nasal drip.  Please take ZOFRAN for nausea/vomiting.     Please consider Pedialyte and/or Gatorade/Powerade for hydration.  Please avoid dairy, spicy foods, greasy foods for symptom relief.  Please slow reintroduce your diet in the following pattern:   Liquids only, if you are able to tolerate, please move to the next step.   Soft foods only, if you are able to tolerate, please move to the next step.   Dennard food only, if you are able to tolerate, please move to the next step.   Regular diet    Please return here or go to the Emergency Department for any concerns or worsening of condition.    Please  drink plenty of fluids.  Please get plenty of rest.  Please utilize saltwater gargles for sore throat.  Please utilize warm tea, and lemon for sore throat relief.  Please utilize over-the-counter throat numbing spray and lozenges for sore throat relief.    If you do not have Hypertension or any history of palpitations, it is ok to take over the counter Sudafed or Mucinex D or Allegra-D or Claritin-D or Zyrtec-D.  If you do take one of the above, it is ok to combine that with plain over the counter Mucinex or Allegra or Claritin or Zyrtec.  If for example you are taking Zyrtec -D, you can combine that with Mucinex, but not Mucinex-D.  If you are taking Mucinex-D, you can combine that with plain Allegra or Claritin or Zyrtec.   If you do have Hypertension or palpitations, it is safe to take Coricidin HBP for relief of sinus symptoms.  We recommend you take over the counter Flonase (Fluticasone) or another nasally inhaled steroid unless you are already taking one.  Nasal irrigation with a saline spray or Netti Pot like device per their directions is also recommended.  If not allergic, please take over the counter Tylenol (Acetaminophen) and/or Motrin (Ibuprofen) as directed for control of pain and/or fever.  Please follow up with your primary care doctor or specialist as needed.    If you  smoke, please stop smoking.    COVID  -     benzonatate (TESSALON) 200 MG capsule; Take 1 capsule (200 mg total) by mouth 3 (three) times daily as needed for Cough.  Dispense: 30 capsule; Refill: 0  -     promethazine-dextromethorphan (PROMETHAZINE-DM) 6.25-15 mg/5 mL Syrp; Take 5 mLs by mouth every 6 (six) hours as needed (cough).  Dispense: 118 mL; Refill: 0  -     (Magic mouthwash) 1:1:1 diphenhydrAMINE(Benadryl) 12.5mg/5ml liq, aluminum & magnesium hydroxide-simethicone (Maalox), LIDOcaine viscous 2%; Swish and spit 10 mLs every 4 (four) hours as needed (Sore Throat).  Dispense: 180 mL; Refill: 0  -     molnupiravir 200 mg  capsule (EUA); Take 4 capsules (800 mg total) by mouth every 12 (twelve) hours. for 5 days  Dispense: 40 capsule; Refill: 0  -     ondansetron (ZOFRAN-ODT) 4 MG TbDL; Take 1 tablet (4 mg total) by mouth every 8 (eight) hours as needed (nausea).  Dispense: 6 tablet; Refill: 0    Cough, unspecified type  -     SARS Coronavirus 2 Antigen, POCT Manual Read      Katherine Kingsley PA-C

## 2023-10-02 ENCOUNTER — TELEPHONE (OUTPATIENT)
Dept: PODIATRY | Facility: CLINIC | Age: 43
End: 2023-10-02

## 2023-10-02 ENCOUNTER — OFFICE VISIT (OUTPATIENT)
Dept: PODIATRY | Facility: CLINIC | Age: 43
End: 2023-10-02
Payer: COMMERCIAL

## 2023-10-02 VITALS — HEIGHT: 64 IN | BODY MASS INDEX: 28.51 KG/M2 | WEIGHT: 167 LBS

## 2023-10-02 DIAGNOSIS — M79.675 TOE PAIN, LEFT: ICD-10-CM

## 2023-10-02 DIAGNOSIS — L03.032 PARONYCHIA, TOE, LEFT: ICD-10-CM

## 2023-10-02 DIAGNOSIS — S90.212A CONTUSION OF LEFT GREAT TOE WITH DAMAGE TO NAIL, INITIAL ENCOUNTER: Primary | ICD-10-CM

## 2023-10-02 PROCEDURE — 11730 NAIL REMOVAL: ICD-10-PCS | Mod: TA,S$GLB,, | Performed by: PODIATRIST

## 2023-10-02 PROCEDURE — 99213 OFFICE O/P EST LOW 20 MIN: CPT | Mod: 25,S$GLB,, | Performed by: PODIATRIST

## 2023-10-02 PROCEDURE — 99999 PR PBB SHADOW E&M-EST. PATIENT-LVL III: ICD-10-PCS | Mod: PBBFAC,,, | Performed by: PODIATRIST

## 2023-10-02 PROCEDURE — 3008F BODY MASS INDEX DOCD: CPT | Mod: CPTII,S$GLB,, | Performed by: PODIATRIST

## 2023-10-02 PROCEDURE — 3044F HG A1C LEVEL LT 7.0%: CPT | Mod: CPTII,S$GLB,, | Performed by: PODIATRIST

## 2023-10-02 PROCEDURE — 99999 PR PBB SHADOW E&M-EST. PATIENT-LVL III: CPT | Mod: PBBFAC,,, | Performed by: PODIATRIST

## 2023-10-02 PROCEDURE — 3008F PR BODY MASS INDEX (BMI) DOCUMENTED: ICD-10-PCS | Mod: CPTII,S$GLB,, | Performed by: PODIATRIST

## 2023-10-02 PROCEDURE — 1159F PR MEDICATION LIST DOCUMENTED IN MEDICAL RECORD: ICD-10-PCS | Mod: CPTII,S$GLB,, | Performed by: PODIATRIST

## 2023-10-02 PROCEDURE — 99213 PR OFFICE/OUTPT VISIT, EST, LEVL III, 20-29 MIN: ICD-10-PCS | Mod: 25,S$GLB,, | Performed by: PODIATRIST

## 2023-10-02 PROCEDURE — 4010F ACE/ARB THERAPY RXD/TAKEN: CPT | Mod: CPTII,S$GLB,, | Performed by: PODIATRIST

## 2023-10-02 PROCEDURE — 4010F PR ACE/ARB THEARPY RXD/TAKEN: ICD-10-PCS | Mod: CPTII,S$GLB,, | Performed by: PODIATRIST

## 2023-10-02 PROCEDURE — 11730 AVULSION NAIL PLATE SIMPLE 1: CPT | Mod: TA,S$GLB,, | Performed by: PODIATRIST

## 2023-10-02 PROCEDURE — 3044F PR MOST RECENT HEMOGLOBIN A1C LEVEL <7.0%: ICD-10-PCS | Mod: CPTII,S$GLB,, | Performed by: PODIATRIST

## 2023-10-02 PROCEDURE — 1159F MED LIST DOCD IN RCRD: CPT | Mod: CPTII,S$GLB,, | Performed by: PODIATRIST

## 2023-10-02 NOTE — PROGRESS NOTES
Subjective:      Patient ID: Codi Wilkerson is a 42 y.o. female.    Chief Complaint: Nail Problem (L great toenail hanging)    Sharp throbbing pain left big toe.  Rapid onset stubbing it two days ago in [place].  Aggraveted with pressure, socks, shoes.  Denies surgery left big toe.      Review of Systems   Constitutional: Negative for chills, decreased appetite, diaphoresis, fever, malaise/fatigue and night sweats.   Skin:  Positive for nail changes.         Objective:      Physical Exam  Constitutional:       General: She is not in acute distress.     Appearance: She is well-developed. She is not diaphoretic.   Cardiovascular:      Pulses:           Popliteal pulses are 2+ on the right side and 2+ on the left side.        Dorsalis pedis pulses are 2+ on the right side and 2+ on the left side.        Posterior tibial pulses are 2+ on the right side and 2+ on the left side.      Comments: Capillary refill 3 seconds all toes/distal feet, all toes/both feet warm to touch.      Negative lymphadenopathy bilateral popliteal fossa and tarsal tunnel.      Negavie lower extremity edema bilateral.    Musculoskeletal:      Right ankle: No swelling, deformity, ecchymosis or lacerations. Normal range of motion. Normal pulse.      Right Achilles Tendon: Normal. No defects. Aponte's test negative.   Lymphadenopathy:      Lower Body: No right inguinal adenopathy. No left inguinal adenopathy.      Comments: Negative lymphadenopathy bilateral popliteal fossa and tarsal tunnel.    Negative lymphangitic streaking bilateral feet/ankles/legs.   Skin:     General: Skin is warm and dry.      Capillary Refill: Capillary refill takes 2 to 3 seconds.      Coloration: Skin is not pale.      Findings: No abrasion, bruising, burn, ecchymosis, erythema, laceration, lesion or rash.      Nails: There is no clubbing.      Comments:   Left hallux nail is partially detachedfrom nailbed with mild periungual erythema, serous drainage, and pain   without pus, tracking, fluctuance, malodor, or cardinal signs infection.    Otherwise, Skin is normal age and health appropriate color, turgor, texture, and temperature bilateral lower extremities without ulceration, hyperpigmentation, discoloration, masses nodules or cords palpated.  No ecchymosis, erythema, edema, or cardinal signs of infection bilateral lower extremities.       Neurological:      Mental Status: She is alert and oriented to person, place, and time.      Sensory: No sensory deficit.      Motor: No tremor, atrophy or abnormal muscle tone.      Gait: Gait normal.      Deep Tendon Reflexes:      Reflex Scores:       Patellar reflexes are 2+ on the right side and 2+ on the left side.       Achilles reflexes are 2+ on the right side and 2+ on the left side.  Psychiatric:         Behavior: Behavior is cooperative.           Assessment:       Encounter Diagnoses   Name Primary?    Contusion of left great toe with damage to nail, initial encounter Yes    Toe pain, left     Paronychia, toe, left          Plan:       Codi was seen today for nail problem.    Diagnoses and all orders for this visit:    Contusion of left great toe with damage to nail, initial encounter    Toe pain, left    Paronychia, toe, left      I counseled the patient on her conditions, their implications and medical management.        Nail avulstion    Tobramycin drops bid left hallux    Cover left hallux all times band aid/similar changing daily.    Discussed conservative treatment with shoes of adequate dimensions, material, and style to alleviate symptoms and delay or prevent surgical intervention.    Surgical shoe today - return to shoes as tolerated.    Xray left          Follow up in about 2 weeks (around 10/16/2023).

## 2023-10-02 NOTE — TELEPHONE ENCOUNTER
Staff informed patient a message was sent to Dr. Perez.    Patient verbalized understanding.        ----- Message from Diamone Speed sent at 10/2/2023 12:46 PM CDT -----  Regarding: self [phone]  Type: Patient Call Back       Who called: self        What is the request in detail:     pt stated she went earlier and was suppose to get medication called in and it has not been received by the pharmacy.       Can the clinic reply by [facility]? Yes       Would the patient rather a call back or a response via [facility]? Call back       Best call back number: [phone]

## 2023-10-02 NOTE — PROCEDURES
Nail Removal    Date/Time: 10/2/2023 8:37 AM    Performed by: Eduardo Perez DPM  Authorized by: Eduardo Perez DPM    Consent Done?:  Yes (Written)  Time out: Immediately prior to the procedure a time out was called    Location:     Location:  Left foot    Location detail:  Left big toe  Anesthesia:     Anesthesia:  Digital block    Local anesthetic:  Lidocaine 1% without epinephrine    Anesthetic total (ml):  4  Procedure Details:     Preparation:  Skin prepped with alcohol    Amount removed:  Complete    Wedge excision of skin of nail fold: No      Nail bed sutured?: No      Nail matrix removed:  None    Removed nail replaced and anchored: No      Dressing applied:  Antibiotic ointment and dressing applied    Patient tolerance:  Patient tolerated the procedure well with no immediate complications

## 2023-10-03 RX ORDER — TOBRAMYCIN 3 MG/ML
SOLUTION/ DROPS OPHTHALMIC
Qty: 5 ML | Refills: 3 | Status: SHIPPED | OUTPATIENT
Start: 2023-10-03 | End: 2023-10-13

## 2023-10-13 ENCOUNTER — TELEPHONE (OUTPATIENT)
Dept: FAMILY MEDICINE | Facility: CLINIC | Age: 43
End: 2023-10-13
Payer: COMMERCIAL

## 2023-10-13 NOTE — TELEPHONE ENCOUNTER
----- Message from Lorenzo Cedillo MA sent at 10/13/2023 11:33 AM CDT -----  Type: Patient Call Back    Who called: Self    What is the request in detail: is asking for a prescription to be sent to the pharmacy for her due to the weather ..     Flonase     Can the clinic reply by [facility]?NO    Would the patient rather a call back or a response via [facility]? Yes, call     Best call back number: [phone] (home)

## 2023-10-23 ENCOUNTER — PATIENT OUTREACH (OUTPATIENT)
Dept: ADMINISTRATIVE | Facility: HOSPITAL | Age: 43
End: 2023-10-23
Payer: COMMERCIAL

## 2023-10-23 NOTE — LETTER
AUTHORIZATION FOR RELEASE OF   CONFIDENTIAL INFORMATION    Dear DR. GAO,    We are seeing Codi Wilkerson, date of birth 1980, in the clinic at [facility]. Ivory Lacy MD is the patient's PCP. Codi Wilkerson has an outstanding lab/procedure at the time we reviewed her chart. In order to help keep her health information updated, she has authorized us to request the following medical record(s):              (X)  DM EYE EXAM                 Please fax records to [facility],  [phone]       If you have any questions, please contact , Jael Bellamy at [phone].           Patient Name: Codi Wilkerson  : 1980  Patient Phone #: [phone]

## 2023-10-23 NOTE — PROGRESS NOTES
Health Maintenance and immunizations reviewed/ updated.  LOREN sent requesting eye exam.  Please consider prescribing an appropriate intensity statin.  Health Maintenance Due   Topic Date Due    Low Dose Statin  Never done    Eye Exam  04/12/2022    COVID-19 Vaccine (4 - 2023-24 season) 09/01/2023

## 2023-10-25 ENCOUNTER — PATIENT OUTREACH (OUTPATIENT)
Dept: ADMINISTRATIVE | Facility: HOSPITAL | Age: 43
End: 2023-10-25
Payer: COMMERCIAL

## 2023-10-26 ENCOUNTER — PATIENT OUTREACH (OUTPATIENT)
Dept: ADMINISTRATIVE | Facility: HOSPITAL | Age: 43
End: 2023-10-26
Payer: COMMERCIAL

## 2023-11-01 ENCOUNTER — HOSPITAL ENCOUNTER (OUTPATIENT)
Dept: RADIOLOGY | Facility: HOSPITAL | Age: 43
Discharge: HOME | End: 2023-11-01
Attending: NURSE PRACTITIONER
Payer: COMMERCIAL

## 2023-11-01 DIAGNOSIS — Z12.31 ENCOUNTER FOR SCREENING MAMMOGRAM FOR BREAST CANCER: ICD-10-CM

## 2023-11-01 PROCEDURE — 77067 SCR MAMMO BI INCL CAD: CPT | Mod: TC,PO

## 2023-11-01 PROCEDURE — 77067 SCR MAMMO BI INCL CAD: CPT | Mod: 26,,, | Performed by: RADIOLOGY

## 2023-11-01 PROCEDURE — 77067 MAMMO DIGITAL SCREENING BILAT WITH TOMO: ICD-10-PCS | Mod: 26,,, | Performed by: RADIOLOGY

## 2023-11-01 PROCEDURE — 77063 BREAST TOMOSYNTHESIS BI: CPT | Mod: 26,,, | Performed by: RADIOLOGY

## 2023-11-01 PROCEDURE — 77063 MAMMO DIGITAL SCREENING BILAT WITH TOMO: ICD-10-PCS | Mod: 26,,, | Performed by: RADIOLOGY

## 2023-11-03 ENCOUNTER — OFFICE VISIT (OUTPATIENT)
Dept: FAMILY MEDICINE | Facility: CLINIC | Age: 43
End: 2023-11-03
Payer: COMMERCIAL

## 2023-11-03 VITALS
HEIGHT: 64 IN | SYSTOLIC BLOOD PRESSURE: 110 MMHG | WEIGHT: 166.88 LBS | TEMPERATURE: 99 F | DIASTOLIC BLOOD PRESSURE: 70 MMHG | BODY MASS INDEX: 28.49 KG/M2 | OXYGEN SATURATION: 96 % | HEART RATE: 96 BPM

## 2023-11-03 DIAGNOSIS — E11.9 TYPE 2 DIABETES MELLITUS WITHOUT COMPLICATION, WITHOUT LONG-TERM CURRENT USE OF INSULIN: Primary | ICD-10-CM

## 2023-11-03 DIAGNOSIS — F31.9 BIPOLAR AFFECTIVE DISORDER, REMISSION STATUS UNSPECIFIED: ICD-10-CM

## 2023-11-03 PROCEDURE — 3008F PR BODY MASS INDEX (BMI) DOCUMENTED: ICD-10-PCS | Mod: CPTII,S$GLB,, | Performed by: FAMILY MEDICINE

## 2023-11-03 PROCEDURE — 99214 PR OFFICE/OUTPT VISIT, EST, LEVL IV, 30-39 MIN: ICD-10-PCS | Mod: S$GLB,,, | Performed by: FAMILY MEDICINE

## 2023-11-03 PROCEDURE — 3078F PR MOST RECENT DIASTOLIC BLOOD PRESSURE < 80 MM HG: ICD-10-PCS | Mod: CPTII,S$GLB,, | Performed by: FAMILY MEDICINE

## 2023-11-03 PROCEDURE — 1159F MED LIST DOCD IN RCRD: CPT | Mod: CPTII,S$GLB,, | Performed by: FAMILY MEDICINE

## 2023-11-03 PROCEDURE — 1160F PR REVIEW ALL MEDS BY PRESCRIBER/CLIN PHARMACIST DOCUMENTED: ICD-10-PCS | Mod: CPTII,S$GLB,, | Performed by: FAMILY MEDICINE

## 2023-11-03 PROCEDURE — 3044F PR MOST RECENT HEMOGLOBIN A1C LEVEL <7.0%: ICD-10-PCS | Mod: CPTII,S$GLB,, | Performed by: FAMILY MEDICINE

## 2023-11-03 PROCEDURE — 1159F PR MEDICATION LIST DOCUMENTED IN MEDICAL RECORD: ICD-10-PCS | Mod: CPTII,S$GLB,, | Performed by: FAMILY MEDICINE

## 2023-11-03 PROCEDURE — 3078F DIAST BP <80 MM HG: CPT | Mod: CPTII,S$GLB,, | Performed by: FAMILY MEDICINE

## 2023-11-03 PROCEDURE — 3074F PR MOST RECENT SYSTOLIC BLOOD PRESSURE < 130 MM HG: ICD-10-PCS | Mod: CPTII,S$GLB,, | Performed by: FAMILY MEDICINE

## 2023-11-03 PROCEDURE — 99999 PR PBB SHADOW E&M-EST. PATIENT-LVL IV: ICD-10-PCS | Mod: PBBFAC,,, | Performed by: FAMILY MEDICINE

## 2023-11-03 PROCEDURE — 3074F SYST BP LT 130 MM HG: CPT | Mod: CPTII,S$GLB,, | Performed by: FAMILY MEDICINE

## 2023-11-03 PROCEDURE — 3008F BODY MASS INDEX DOCD: CPT | Mod: CPTII,S$GLB,, | Performed by: FAMILY MEDICINE

## 2023-11-03 PROCEDURE — 99999 PR PBB SHADOW E&M-EST. PATIENT-LVL IV: CPT | Mod: PBBFAC,,, | Performed by: FAMILY MEDICINE

## 2023-11-03 PROCEDURE — 4010F PR ACE/ARB THEARPY RXD/TAKEN: ICD-10-PCS | Mod: CPTII,S$GLB,, | Performed by: FAMILY MEDICINE

## 2023-11-03 PROCEDURE — 3044F HG A1C LEVEL LT 7.0%: CPT | Mod: CPTII,S$GLB,, | Performed by: FAMILY MEDICINE

## 2023-11-03 PROCEDURE — 1160F RVW MEDS BY RX/DR IN RCRD: CPT | Mod: CPTII,S$GLB,, | Performed by: FAMILY MEDICINE

## 2023-11-03 PROCEDURE — 4010F ACE/ARB THERAPY RXD/TAKEN: CPT | Mod: CPTII,S$GLB,, | Performed by: FAMILY MEDICINE

## 2023-11-03 PROCEDURE — 99214 OFFICE O/P EST MOD 30 MIN: CPT | Mod: S$GLB,,, | Performed by: FAMILY MEDICINE

## 2023-11-03 NOTE — PROGRESS NOTES
"Routine Office Visit    Codi Wilkerson  1980  [id number]      Subjective     Codi is a 42 y.o. female who presents today for:    Diabetes - Patient is here for blood work follow-up - she is doing well on mounjaro and jardiance. She reports no side effect from current regimen. She was unable to tolerate metformin.   Bipolar disorder - Patient sees psychiatrist. She is stable on current regimen.      Objective     Review of Systems   Constitutional:  Negative for chills and fever.   HENT:  Negative for congestion.    Eyes:  Negative for blurred vision.   Respiratory:  Negative for cough.    Cardiovascular:  Negative for chest pain.   Gastrointestinal:  Negative for abdominal pain, constipation, diarrhea, heartburn, nausea and vomiting.   Genitourinary:  Negative for dysuria.   Musculoskeletal:  Negative for myalgias.   Skin:  Negative for itching and rash.   Neurological:  Negative for dizziness and headaches.   Psychiatric/Behavioral:  Negative for depression.      Do you have leakage of urine? No    /70   Pulse 96   Temp 99 °F (37.2 °C)   Ht 5' 4" (1.626 m)   Wt 75.7 kg (166 lb 14.2 oz)   LMP 02/12/2009   SpO2 96%   BMI 28.65 kg/m²   Physical Exam  Constitutional:       Appearance: She is well-developed.   HENT:      Head: Normocephalic and atraumatic.   Eyes:      Conjunctiva/sclera: Conjunctivae normal.      Pupils: Pupils are equal, round, and reactive to light.   Cardiovascular:      Rate and Rhythm: Normal rate and regular rhythm.      Heart sounds: Normal heart sounds. No murmur heard.     No friction rub. No gallop.   Pulmonary:      Effort: No respiratory distress.      Breath sounds: Normal breath sounds.   Abdominal:      General: Bowel sounds are normal. There is no distension.      Palpations: Abdomen is soft.      Tenderness: There is no abdominal tenderness.   Musculoskeletal:         General: Normal range of motion.      Cervical back: Normal range of motion and neck supple. "   Lymphadenopathy:      Cervical: No cervical adenopathy.   Skin:     General: Skin is warm.   Neurological:      Mental Status: She is alert and oriented to person, place, and time.             Assessment     Health Maintenance         Date Due Completion Date    Low Dose Statin Never done ---    Eye Exam 04/12/2022 4/12/2021 (Done)    Override on 4/12/2021: Done (Dr. Crump  - april 2021)    Diabetes Urine Screening 07/09/2023 7/9/2022    Mammogram 08/10/2023 8/10/2022    COVID-19 Vaccine (4 - 2023-24 season) 09/01/2023 10/22/2021    Hemoglobin A1c 05/01/2024 11/1/2023    Foot Exam 05/02/2024 5/2/2023 (Done)    Override on 5/2/2023: Done    Override on 2/3/2020: Done    Lipid Panel 05/02/2024 5/2/2023    TETANUS VACCINE 10/02/2033 10/2/2023              Problem List Items Addressed This Visit          Psychiatric    Bipolar disorder  The current medical regimen is effective;  continue present plan and medications.          Endocrine    Type 2 diabetes mellitus without complication, without long-term current use of insulin - Primary    Overview     type 2         Relevant Medications    tirzepatide 10 mg/0.5 mL PnIj  The current medical regimen is effective;  continue present plan and medications.       Other Relevant Orders    CBC Auto Differential    Comprehensive Metabolic Panel    Lipid Panel    Hemoglobin A1C    TSH             No follow-ups on file.

## 2023-12-20 ENCOUNTER — TELEPHONE (OUTPATIENT)
Dept: FAMILY MEDICINE | Facility: CLINIC | Age: 43
End: 2023-12-20
Payer: COMMERCIAL

## 2023-12-20 NOTE — TELEPHONE ENCOUNTER
----- Message from Britney Jackson sent at 12/20/2023  1:26 PM CST -----  Regarding: self  Who called: self        What is the request in detail: pt calling to see if provider send patches for her to quit smoking over to phar        Can the clinic reply by [facility]? No        Would the patient rather a call back or a response via [facility]? Call back       Best call back number:[phone]

## 2024-01-02 ENCOUNTER — PATIENT OUTREACH (OUTPATIENT)
Dept: ADMINISTRATIVE | Facility: HOSPITAL | Age: 44
End: 2024-01-02
Payer: COMMERCIAL

## 2024-01-02 ENCOUNTER — PATIENT MESSAGE (OUTPATIENT)
Dept: ADMINISTRATIVE | Facility: HOSPITAL | Age: 44
End: 2024-01-02
Payer: COMMERCIAL

## 2024-01-09 ENCOUNTER — CLINICAL SUPPORT (OUTPATIENT)
Dept: SMOKING CESSATION | Facility: CLINIC | Age: 44
End: 2024-01-09

## 2024-01-09 DIAGNOSIS — F17.200 NICOTINE DEPENDENCE: Primary | ICD-10-CM

## 2024-01-09 PROCEDURE — 99999 PR PBB SHADOW E&M-EST. PATIENT-LVL II: CPT | Mod: PBBFAC,,,

## 2024-01-09 RX ORDER — IBUPROFEN 200 MG
1 TABLET ORAL DAILY
Qty: 14 PATCH | Refills: 0 | Status: SHIPPED | OUTPATIENT
Start: 2024-01-09 | End: 2024-01-23 | Stop reason: SDUPTHER

## 2024-01-09 NOTE — Clinical Note
PATIENT PRESENTS FOR INTAKE SMOKING 1.5 PKS PER DAY OF CIGARETTES AFTER ASSESSMENT AND DISCUSSION RECOMMEND THE 21MG NICOTINE PATCH DAILY, SHE WAS WANTING TO TRY CHANTIX AGAIN HOWEVER SHE HAS NOT TRIED THE PATCHES, RECOMMEND TRYING THE 21MG NICOTINE PATCHES FIRST TO SEE IF THE PATCHES ARE A GOOD OPTION AS ITS LESS INVASIVE THEN A MEDICATION, SESSION HANDOUT AND STRATEGIES AND ENCOURAGEMENT DISCUSSED WILL FOLLOW

## 2024-01-19 LAB
LEFT EYE DM RETINOPATHY: NEGATIVE
RIGHT EYE DM RETINOPATHY: NEGATIVE

## 2024-01-23 ENCOUNTER — CLINICAL SUPPORT (OUTPATIENT)
Dept: SMOKING CESSATION | Facility: CLINIC | Age: 44
End: 2024-01-23

## 2024-01-23 DIAGNOSIS — F17.200 NICOTINE DEPENDENCE: Primary | ICD-10-CM

## 2024-01-23 PROCEDURE — 99999 PR PBB SHADOW E&M-EST. PATIENT-LVL I: CPT | Mod: PBBFAC,,,

## 2024-01-23 RX ORDER — IBUPROFEN 200 MG
1 TABLET ORAL DAILY
Qty: 14 PATCH | Refills: 0 | Status: SHIPPED | OUTPATIENT
Start: 2024-01-23 | End: 2024-02-22

## 2024-01-23 RX ORDER — VARENICLINE TARTRATE 0.5 (11)-1
KIT ORAL
Qty: 1 EACH | Refills: 0 | Status: SHIPPED | OUTPATIENT
Start: 2024-01-23 | End: 2024-05-06

## 2024-01-23 NOTE — PROGRESS NOTES
Patient presents to clinic earlier then her scheduled appt, will bring her in to the room for her appt as soon as ctts completes previous appt

## 2024-01-23 NOTE — PROGRESS NOTES
Individual Follow-Up Form    1/23/2024    Quit Date: n/a    Clinical Status of Patient: Outpatient    Length of Service: 45 minutes    Continuing Medication: yes  Patches    Other Medications: ADDING CHANTIX     Target Symptoms: Withdrawal and medication side effects. The following were  rated moderate (3) to severe (4) on TCRS:  Moderate (3): 0  Severe (4): 0    Comments: PATIENT PRESENTS FOR FOLLOW UP IN CLINIC, SHE IS SMOKING 10 CIGARETTES PER DAY AND WEARING THE 21MG NICOTINE PATCH DAILY, SHE STATE SHE DOES NOT FEEL THE NICOTINE PATCH IS WORKING FOR HER, EXPLAINED TO HER THAT SHE IS SMOKING LESS AND SHE NEEDS TO UNDERSTAND THE NICOTINE PATCH IS A LONG ACTING NICOTINE WHICH MEANS THERE ARE NO SHORT ACTING SPIKES OF NICOTINE LIKE THE CIGARETTE PROVIDES HOWEVER SHE IS SMOKING LESS AND LESS IS ALWAYS A GREAT THING, SHE IS WANTING TO ADD CHANTIX TO THE BACK END AND START THAT, HER MEDICAL INSURANCE COVERS IT AND SHE IS REQUESTING THAT CTTS CAN SEND TO HER PREFERRED PHARMACY FOR HER INSURANCE TO COVER, WILL DO THAT AND EXPLAINED HOW CHANTIX WORKS AND THAT STUDIES HAVE SHOWN THAT TAKING THESE TWO TREATMENTS TOGETHER CAN HELP AID IN QUITTING SUCCESSFULLY, EXPLAINED HOW CHANTIX WORKS AND WHAT S/E ARE POSSIBLE AS WELL AS HOW TO TAKE TO AVOID S/E, STRATEGIES AND SESSION HANDOUT DISCUSSED WILL FOLLOW     Diagnosis: F17.210    Next Visit: 2 weeks

## 2024-01-26 ENCOUNTER — PATIENT MESSAGE (OUTPATIENT)
Dept: ADMINISTRATIVE | Facility: HOSPITAL | Age: 44
End: 2024-01-26
Payer: COMMERCIAL

## 2024-01-26 ENCOUNTER — PATIENT OUTREACH (OUTPATIENT)
Dept: ADMINISTRATIVE | Facility: HOSPITAL | Age: 44
End: 2024-01-26
Payer: COMMERCIAL

## 2024-02-07 ENCOUNTER — CLINICAL SUPPORT (OUTPATIENT)
Dept: SMOKING CESSATION | Facility: CLINIC | Age: 44
End: 2024-02-07

## 2024-02-07 DIAGNOSIS — F17.200 NICOTINE DEPENDENCE: Primary | ICD-10-CM

## 2024-02-07 PROCEDURE — 99999 PR PBB SHADOW E&M-EST. PATIENT-LVL II: CPT | Mod: PBBFAC,,,

## 2024-02-22 ENCOUNTER — CLINICAL SUPPORT (OUTPATIENT)
Dept: SMOKING CESSATION | Facility: CLINIC | Age: 44
End: 2024-02-22

## 2024-02-22 DIAGNOSIS — F17.200 NICOTINE DEPENDENCE: Primary | ICD-10-CM

## 2024-02-22 PROCEDURE — 99999 PR PBB SHADOW E&M-EST. PATIENT-LVL II: CPT | Mod: PBBFAC,,,

## 2024-02-22 RX ORDER — IBUPROFEN 200 MG
1 TABLET ORAL DAILY
Qty: 14 PATCH | Refills: 0 | Status: SHIPPED | OUTPATIENT
Start: 2024-02-22 | End: 2024-03-14 | Stop reason: SDUPTHER

## 2024-02-22 RX ORDER — VARENICLINE TARTRATE 1 MG/1
1 TABLET, FILM COATED ORAL 2 TIMES DAILY
Qty: 60 TABLET | Refills: 0 | Status: SHIPPED | OUTPATIENT
Start: 2024-02-22 | End: 2024-03-21

## 2024-02-22 NOTE — PROGRESS NOTES
Individual Follow-Up Form    2/22/2024    Quit Date: n/a    Clinical Status of Patient: Outpatient    Length of Service: 45 minutes    Continuing Medication: yes  Chantix or Patches    Other Medications: N/A     Target Symptoms: Withdrawal and medication side effects. The following were  rated moderate (3) to severe (4) on TCRS:  Moderate (3): 0  Severe (4): 0    Comments: PATIENT PRESENTS FOR FOLLOW UP SMOKING 10 CIGARETTES PER DAY, SHE IS ON MONTH 3 OF CHANTIX AND WEARING THE 21MG NICOTINE PATCH, SHE IS DOWN FROM 1.5 PACKS PER DAY, SHE DENIES NEGATIVE S/E, SHE HAS NOT PROGRESSED ANY LESS IN HER SMOKING OVER THE LAST TWO WEEKS HOWEVER SHE IS  STILL EARLY ON WITH THE CHANTIX THEREFORE REMINDED HER TO CONTINUE TO TAKE CHANTIX CONSISTENTLY AND THAT IT CAN TAKE 4-6 WEEKS TO KICK IN, RECOMMEND THAT SHE CONTINUE TO WEAR THE 21MG NICOTINE PATCH DAILY X 1 MORE WEEK THEN DECREASE TO THE 14MG NICOTINE PATCH WHEN SHE STARTS THE SECOND MONTH OF CHANTIX. WILL CONTINUE THE WEANING PROCESS AS SHE PROGRESSES WITH CHANTIX, ENCOURAGEMENT AND STRATEGIES PROVIDED AS WELL AS SESSION HANDOUT DISCUSSED WILL FOLLOW      Diagnosis: F17.200    Next Visit: 2 weeks

## 2024-03-07 ENCOUNTER — CLINICAL SUPPORT (OUTPATIENT)
Dept: SMOKING CESSATION | Facility: CLINIC | Age: 44
End: 2024-03-07

## 2024-03-07 DIAGNOSIS — F17.200 NICOTINE DEPENDENCE: Primary | ICD-10-CM

## 2024-03-07 PROCEDURE — 99999 PR PBB SHADOW E&M-EST. PATIENT-LVL I: CPT | Mod: PBBFAC,,,

## 2024-03-14 ENCOUNTER — CLINICAL SUPPORT (OUTPATIENT)
Dept: SMOKING CESSATION | Facility: CLINIC | Age: 44
End: 2024-03-14

## 2024-03-14 DIAGNOSIS — F17.200 NICOTINE DEPENDENCE: ICD-10-CM

## 2024-03-14 PROCEDURE — 99999 PR PBB SHADOW E&M-EST. PATIENT-LVL I: CPT | Mod: PBBFAC,,,

## 2024-03-14 RX ORDER — IBUPROFEN 200 MG
1 TABLET ORAL DAILY
Qty: 14 PATCH | Refills: 0 | Status: SHIPPED | OUTPATIENT
Start: 2024-03-14 | End: 2024-05-06

## 2024-03-14 NOTE — PROGRESS NOTES
Individual Follow-Up Form    3/14/2024    Quit Date: n/a    Clinical Status of Patient: Outpatient    Length of Service: 45 minutes    Continuing Medication: yes  Chantix or Patches    Other Medications: n/a     Target Symptoms: Withdrawal and medication side effects. The following were  rated moderate (3) to severe (4) on TCRS:  Moderate (3): 0  Severe (4): 0    Comments: patient presents for in clinic appt smoking about the same 15 cigarettes per day, she is taking chantix po bid 1mg with no negative s/e, she is starting to get the desired effects from the chantix, she is on month 2 of chantix week 1, she is wearing the 14mg nicotine patch daily, she has about  10 left, recommend another 3 weeks of wearing the 14mg nicotine patch and then will start dropping her to the 7mg patch and will continue to wean, informed her that she should be quit in the next month and if not then she is fighting the chantix and is not 100% ready to quit or the chantix is not working for her, encouraged control over her smoking, mindfulness and building up the willpower to reach of goal, recommend quitting before her next clinic follow up, strategies and session handout discussed will follow     Diagnosis: F17.210    Next Visit: 2 weeks

## 2024-03-21 DIAGNOSIS — F17.200 NICOTINE DEPENDENCE: ICD-10-CM

## 2024-03-21 RX ORDER — VARENICLINE TARTRATE 1 MG/1
1 TABLET, FILM COATED ORAL 2 TIMES DAILY
Qty: 60 TABLET | Refills: 0 | Status: SHIPPED | OUTPATIENT
Start: 2024-03-21 | End: 2024-05-06

## 2024-03-27 DIAGNOSIS — E11.9 TYPE 2 DIABETES MELLITUS WITHOUT COMPLICATION, UNSPECIFIED WHETHER LONG TERM INSULIN USE: ICD-10-CM

## 2024-04-03 DIAGNOSIS — E11.9 TYPE 2 DIABETES MELLITUS WITHOUT COMPLICATION, WITHOUT LONG-TERM CURRENT USE OF INSULIN: ICD-10-CM

## 2024-04-03 NOTE — TELEPHONE ENCOUNTER
Patient stated that pharmacy is currently out of stock on prescribed mounjaro as well as 7.5mg asking to get a lower dose so that she is taking something possibly the 5mg.

## 2024-04-03 NOTE — TELEPHONE ENCOUNTER
----- Message from Keri Stone sent at 4/3/2024  2:18 PM CDT -----  Regarding: self [phone]  Type: Patient Call Back    Who called: self     What is the request in detail: Pt asked to speak to the nurse regarding her mounjaro. No further details were given.     Can the clinic reply by [facility]? no    Would the patient rather a call back or a response via [facility]?  Call back     Best call back number: [phone]

## 2024-04-03 NOTE — TELEPHONE ENCOUNTER
Care Due:                  Date            Visit Type   Department     Provider  --------------------------------------------------------------------------------                                [facility]                              PRIMARY      MED/ INTERNAL  Last Visit: 11-      CARE ([facility])   MED/ PEDS      Ivory Lacy                              [facility]      MED/ INTERNAL  Next Visit: 05-      CARE ([facility])   MED/ PEDS      Ivory Lacy                                                            Last  Test          Frequency    Reason                     Performed    Due Date  --------------------------------------------------------------------------------    CBC.........  12 months..  fenofibrate..............  05- 04-    HBA1C.......  6 months...  empagliflozin,             11- 04-                             tirzepatide..............    Lipid Panel.  12 months..  fenofibrate..............  05- 04-    Vitamin D...  12 months..  ergocalciferol...........  Not Found    Overdue    [employer] Embedded Care Due Messages. Reference number: [id number].   4/03/2024 2:29:20 PM CDT

## 2024-04-04 ENCOUNTER — PATIENT OUTREACH (OUTPATIENT)
Dept: ADMINISTRATIVE | Facility: HOSPITAL | Age: 44
End: 2024-04-04

## 2024-04-04 RX ORDER — TIRZEPATIDE 5 MG/.5ML
5 INJECTION, SOLUTION SUBCUTANEOUS
Qty: 4 PEN | Refills: 3 | Status: SHIPPED | OUTPATIENT
Start: 2024-04-04

## 2024-04-04 NOTE — PROGRESS NOTES
Subjective:       Patient ID: Codi Wilkerson is a 39 y.o. female.    Chief Complaint: DM2    HPI   Codi Wilkerson is a 39 y.o. female presenting via video visit for evaluation/follow up of the following issues. This visit occurs during the COVID 19 outbreak.     The patient location is: patient's home  The chief complaint leading to consultation is: DM follow up  Visit type: Virtual visit with synchronous audio and video  Total time spent with patient: 8 minutes  Each patient to whom he or she provides medical services by telemedicine is:  (1) informed of the relationship between the physician and patient and the respective role of any other health care provider with respect to management of the patient; and (2) notified that he or she may decline to receive medical services by telemedicine and may withdraw from such care at any time.      DM 10.5% in February down to 7.4% on 5/1!  Saw Andrew Krueger on 3/31.  Added jardiance 10mg daily  Continue trulicity     Liver enzymes trended down.   ast 61 -> 33 and ALT 92 -> 53.     High triglycerides improving from 794 to 271 and LDL is low at 57.    Eye exam Dr. Crump in [place], will be going in next few months.     Review of Systems   Constitutional: Negative for activity change and unexpected weight change.   HENT: Negative for hearing loss, rhinorrhea and trouble swallowing.    Eyes: Negative for discharge and visual disturbance.   Respiratory: Negative for chest tightness and wheezing.    Cardiovascular: Negative for chest pain and palpitations.   Gastrointestinal: Negative for blood in stool, constipation, diarrhea and vomiting.   Endocrine: Negative for polydipsia and polyuria.   Genitourinary: Negative for difficulty urinating, dysuria, hematuria and menstrual problem.   Musculoskeletal: Negative for arthralgias, joint swelling and neck pain.   Neurological: Negative for weakness and headaches.   Psychiatric/Behavioral: Positive for dysphoric mood. Negative  for confusion.       Objective:   LMP 02/12/2009      Physical Exam   Constitutional: She is oriented to person, place, and time. She appears well-developed and well-nourished. No distress.   HENT:   Head: Atraumatic.   Pulmonary/Chest: Effort normal. No respiratory distress.   Neurological: She is alert and oriented to person, place, and time.   Skin: She is not diaphoretic.   Psychiatric: She has a normal mood and affect. Her behavior is normal.       Assessment:       1. Type 2 diabetes mellitus without complication, without long-term current use of insulin    2. Mixed hyperlipidemia    3. Need for Streptococcus pneumoniae vaccination        Plan:       Diagnoses and all orders for this visit:    Type 2 diabetes mellitus without complication, without long-term current use of insulin  -     Pneumococcal Polysaccharide Vaccine (23 Valent) (SQ/IM); Future will do at [facility] pharmacy  a1c is showing great improvement! Keep up the good work, continue current meds. Labs and visit with Andrew Krueger scheduled in August    Mixed hyperlipidemia  Triglyceride level improved, continue fenofibrate    Need for Streptococcus pneumoniae vaccination  -     Pneumococcal Polysaccharide Vaccine (23 Valent) (SQ/IM); Future    rtc 1 year or PRN       no gross abnormalities negative

## 2024-04-05 ENCOUNTER — PATIENT OUTREACH (OUTPATIENT)
Dept: ADMINISTRATIVE | Facility: HOSPITAL | Age: 44
End: 2024-04-05
Payer: COMMERCIAL

## 2024-04-10 ENCOUNTER — PATIENT OUTREACH (OUTPATIENT)
Dept: ADMINISTRATIVE | Facility: HOSPITAL | Age: 44
End: 2024-04-10
Payer: COMMERCIAL

## 2024-04-17 ENCOUNTER — CLINICAL SUPPORT (OUTPATIENT)
Dept: SMOKING CESSATION | Facility: CLINIC | Age: 44
End: 2024-04-17

## 2024-04-17 DIAGNOSIS — F17.200 NICOTINE DEPENDENCE: Primary | ICD-10-CM

## 2024-04-17 PROCEDURE — 99999 PR PBB SHADOW E&M-EST. PATIENT-LVL I: CPT | Mod: PBBFAC,,,

## 2024-04-24 ENCOUNTER — PATIENT OUTREACH (OUTPATIENT)
Dept: ADMINISTRATIVE | Facility: HOSPITAL | Age: 44
End: 2024-04-24
Payer: COMMERCIAL

## 2024-04-25 ENCOUNTER — TELEPHONE (OUTPATIENT)
Dept: SMOKING CESSATION | Facility: CLINIC | Age: 44
End: 2024-04-25
Payer: COMMERCIAL

## 2024-05-06 ENCOUNTER — OFFICE VISIT (OUTPATIENT)
Dept: FAMILY MEDICINE | Facility: CLINIC | Age: 44
End: 2024-05-06
Payer: COMMERCIAL

## 2024-05-06 DIAGNOSIS — E11.9 TYPE 2 DIABETES MELLITUS WITHOUT COMPLICATION, WITHOUT LONG-TERM CURRENT USE OF INSULIN: ICD-10-CM

## 2024-05-06 DIAGNOSIS — F31.9 BIPOLAR AFFECTIVE DISORDER, REMISSION STATUS UNSPECIFIED: ICD-10-CM

## 2024-05-06 DIAGNOSIS — F32.A ANXIETY AND DEPRESSION: ICD-10-CM

## 2024-05-06 DIAGNOSIS — F41.9 ANXIETY AND DEPRESSION: ICD-10-CM

## 2024-05-06 DIAGNOSIS — E78.2 MIXED HYPERLIPIDEMIA: Primary | ICD-10-CM

## 2024-05-06 DIAGNOSIS — L65.9 HAIR LOSS: ICD-10-CM

## 2024-05-06 PROCEDURE — 2023F DILAT RTA XM W/O RTNOPTHY: CPT | Mod: CPTII,95,, | Performed by: FAMILY MEDICINE

## 2024-05-06 PROCEDURE — 1159F MED LIST DOCD IN RCRD: CPT | Mod: CPTII,95,, | Performed by: FAMILY MEDICINE

## 2024-05-06 PROCEDURE — 4010F ACE/ARB THERAPY RXD/TAKEN: CPT | Mod: CPTII,95,, | Performed by: FAMILY MEDICINE

## 2024-05-06 PROCEDURE — 1160F RVW MEDS BY RX/DR IN RCRD: CPT | Mod: CPTII,95,, | Performed by: FAMILY MEDICINE

## 2024-05-06 PROCEDURE — 99214 OFFICE O/P EST MOD 30 MIN: CPT | Mod: 95,,, | Performed by: FAMILY MEDICINE

## 2024-05-06 RX ORDER — MINOXIDIL 2.5 MG/1
2.5 TABLET ORAL DAILY
Qty: 30 TABLET | Refills: 3 | Status: SHIPPED | OUTPATIENT
Start: 2024-05-06 | End: 2025-05-06

## 2024-05-06 RX ORDER — ATORVASTATIN CALCIUM 20 MG/1
20 TABLET, FILM COATED ORAL DAILY
Qty: 30 TABLET | Refills: 3 | Status: SHIPPED | OUTPATIENT
Start: 2024-05-06 | End: 2025-05-06

## 2024-05-06 NOTE — PROGRESS NOTES
Routine Office Visit    Codi Wilkerson  1980  [id number]      Subjective     Codi is a 43 y.o. female who presents today for:    Diabetes - Patient is here for blood work follow-up - she is doing well on mounjaro and jardiance. She reports no side effect from current regimen. She was unable to tolerate metformin. Most recent A1c 6.5. Patient will fax in report.   Bipolar disorder - Patient sees psychiatrist. She is stable on current regimen.  Patient reports getting blood work from psychiatry   hypertriglyceridemia - Patient has elevated triglycerides. She has been taking medication as prescribed.   Hair loss - Patient has tried otc medications. Requesting minoxidil     Objective     Review of Systems   Constitutional:  Negative for chills and fever.   HENT:  Negative for congestion and hearing loss.    Eyes:  Negative for blurred vision and discharge.   Respiratory:  Negative for cough and wheezing.    Cardiovascular:  Negative for chest pain and palpitations.   Gastrointestinal:  Negative for abdominal pain, blood in stool, constipation, diarrhea, heartburn, nausea and vomiting.   Genitourinary:  Negative for dysuria and hematuria.   Musculoskeletal:  Negative for myalgias and neck pain.   Skin:  Negative for itching and rash.   Neurological:  Negative for dizziness, weakness and headaches.   Endo/Heme/Allergies:  Negative for polydipsia.   Psychiatric/Behavioral:  Negative for depression.       Answers submitted by the patient for this visit:  Review of Systems Questionnaire (Submitted on 5/6/2024)  activity change: No  unexpected weight change: No  rhinorrhea: No  trouble swallowing: No  visual disturbance: No  chest tightness: No  polyuria: No  difficulty urinating: No  menstrual problem: No  joint swelling: No  arthralgias: No  confusion: No  dysphoric mood: No    LMP 02/12/2009   Physical Exam  Constitutional:       Appearance: She is well-developed.   HENT:      Head: Normocephalic and atraumatic.    Eyes:      Conjunctiva/sclera: Conjunctivae normal.      Pupils: Pupils are equal, round, and reactive to light.   Cardiovascular:      Rate and Rhythm: Normal rate and regular rhythm.      Heart sounds: Normal heart sounds. No murmur heard.     No friction rub. No gallop.   Pulmonary:      Effort: No respiratory distress.      Breath sounds: Normal breath sounds.   Abdominal:      General: Bowel sounds are normal. There is no distension.      Palpations: Abdomen is soft.      Tenderness: There is no abdominal tenderness.   Musculoskeletal:         General: Normal range of motion.      Cervical back: Normal range of motion and neck supple.   Lymphadenopathy:      Cervical: No cervical adenopathy.   Skin:     General: Skin is warm.   Neurological:      Mental Status: She is alert and oriented to person, place, and time.             Assessment     Health Maintenance         Date Due Completion Date    COVID-19 Vaccine (4 - 2023-24 season) 09/01/2023 10/22/2021    Foot Exam 05/02/2024 5/2/2023 (Done)    Override on 5/2/2023: Done    Override on 2/3/2020: Done    Lipid Panel 05/02/2024 5/2/2023    Hemoglobin A1c 05/01/2024 11/1/2023    Diabetes Urine Screening 11/01/2024 11/1/2023    Mammogram 11/01/2024 11/1/2023    Eye Exam 01/19/2025 1/19/2024    Override on 4/12/2021: Done (Dr. Crump  - april 2021)    Low Dose Statin 05/06/2025 5/6/2024    TETANUS VACCINE 10/02/2033 10/2/2023              Problem List Items Addressed This Visit          Psychiatric    Anxiety and depression    Overview     psychiatrist Dr. Casanova         Bipolar disorder       Cardiac/Vascular    Hyperlipidemia - Primary    Relevant Medications    atorvastatin (LIPITOR) 20 MG tablet  The current medical regimen is effective;  continue present plan and medications.          Endocrine    Type 2 diabetes mellitus without complication, without long-term current use of insulin    Overview     type 2         Relevant Medications    tirzepatide 10  mg/0.5 mL PnIj  The current medical regimen is effective;  continue present plan and medications.        Other Visit Diagnoses       Hair loss        Relevant Medications    minoxidiL (LONITEN) 2.5 MG tablet  Common side effects of this medication were discussed with the patient. Questions regarding medications were discussed during this visit.    Consider referral for dermatology                     Follow up in about 6 months (around 11/6/2024), or if symptoms worsen or fail to improve, for yearly exam.

## 2024-05-14 DIAGNOSIS — E11.9 TYPE 2 DIABETES MELLITUS WITHOUT COMPLICATION, WITHOUT LONG-TERM CURRENT USE OF INSULIN: ICD-10-CM

## 2024-05-14 DIAGNOSIS — E78.5 HYPERLIPIDEMIA, UNSPECIFIED HYPERLIPIDEMIA TYPE: ICD-10-CM

## 2024-05-14 RX ORDER — LOSARTAN POTASSIUM 25 MG/1
25 TABLET ORAL
Qty: 90 TABLET | Refills: 1 | Status: SHIPPED | OUTPATIENT
Start: 2024-05-14

## 2024-05-14 RX ORDER — FENOFIBRATE 134 MG/1
CAPSULE ORAL
Qty: 90 CAPSULE | Refills: 0 | Status: SHIPPED | OUTPATIENT
Start: 2024-05-14

## 2024-05-14 NOTE — TELEPHONE ENCOUNTER
No care due was identified.  [facility] Embedded Care Due Messages. Reference number: [id number].   5/14/2024 10:33:04 AM CDT

## 2024-05-14 NOTE — TELEPHONE ENCOUNTER
Refill Routing Note   Medication(s) are not appropriate for processing by [facility] for the following reason(s):        Required labs outdated    [facility] action(s):  Defer  Approve               Appointments  past 12m or future 3m with PCP    Date Provider   Last Visit   5/6/2024 Ivory Lacy MD   Next Visit   Visit date not found Ivory Lacy MD   ED visits in past 90 days: 0        Note composed:12:00 PM 05/14/2024

## 2024-05-20 DIAGNOSIS — K21.9 GASTROESOPHAGEAL REFLUX DISEASE, UNSPECIFIED WHETHER ESOPHAGITIS PRESENT: ICD-10-CM

## 2024-05-20 DIAGNOSIS — E55.9 VITAMIN D DEFICIENCY: ICD-10-CM

## 2024-05-20 DIAGNOSIS — U07.1 COVID: ICD-10-CM

## 2024-05-20 RX ORDER — ONDANSETRON 4 MG/1
4 TABLET, ORALLY DISINTEGRATING ORAL EVERY 8 HOURS PRN
Qty: 6 TABLET | Refills: 0 | Status: SHIPPED | OUTPATIENT
Start: 2024-05-20

## 2024-05-20 RX ORDER — ERGOCALCIFEROL 1.25 MG/1
50000 CAPSULE ORAL
Qty: 12 CAPSULE | Refills: 3 | Status: SHIPPED | OUTPATIENT
Start: 2024-05-20

## 2024-05-20 RX ORDER — PANTOPRAZOLE SODIUM 40 MG/1
40 TABLET, DELAYED RELEASE ORAL 2 TIMES DAILY
Qty: 180 TABLET | Refills: 3 | Status: SHIPPED | OUTPATIENT
Start: 2024-05-20

## 2024-05-20 NOTE — TELEPHONE ENCOUNTER
----- Message from Erin Rodriguez sent at 5/20/2024  2:24 PM CDT -----  .Type: RX Refill Request    Who Called: Self     Have you contacted your pharmacy: Yes     Refill or New Rx: New Rx     RX Name and Strength:ondansetron (ZOFRAN-ODT) 4 MG TbDL    Preferred Pharmacy with phone number: [facility] [address]  [address]  [facility] [address]  Phone: [phone] Fax: [phone]    Local or Mail Order: Local     Ordering Provider: Regino    Would the patient rather a call back or a response via [facility]?  Call Back     Best Call Back Number: .[phone] (home)       Additional Information:

## 2024-05-20 NOTE — TELEPHONE ENCOUNTER
No care due was identified.  [facility] Embedded Care Due Messages. Reference number: [id number].   5/20/2024 2:27:15 PM CDT

## 2024-07-18 ENCOUNTER — CLINICAL SUPPORT (OUTPATIENT)
Dept: SMOKING CESSATION | Facility: CLINIC | Age: 44
End: 2024-07-18

## 2024-07-18 DIAGNOSIS — F17.200 NICOTINE DEPENDENCE: Primary | ICD-10-CM

## 2024-07-18 PROCEDURE — 99999 PR PBB SHADOW E&M-EST. PATIENT-LVL I: CPT | Mod: PBBFAC,,,

## 2024-08-01 ENCOUNTER — CLINICAL SUPPORT (OUTPATIENT)
Dept: SMOKING CESSATION | Facility: CLINIC | Age: 44
End: 2024-08-01

## 2024-08-01 DIAGNOSIS — F17.200 NICOTINE DEPENDENCE: Primary | ICD-10-CM

## 2024-08-01 PROCEDURE — 99999 PR PBB SHADOW E&M-EST. PATIENT-LVL I: CPT | Mod: PBBFAC,,,

## 2024-08-08 ENCOUNTER — TELEPHONE (OUTPATIENT)
Dept: SMOKING CESSATION | Facility: CLINIC | Age: 44
End: 2024-08-08
Payer: COMMERCIAL

## 2024-08-14 DIAGNOSIS — E11.9 TYPE 2 DIABETES MELLITUS WITHOUT COMPLICATION, WITHOUT LONG-TERM CURRENT USE OF INSULIN: ICD-10-CM

## 2024-08-14 NOTE — TELEPHONE ENCOUNTER
Care Due:                  Date            Visit Type   Department     Provider  --------------------------------------------------------------------------------                                ESTABLISHED   [facility]                              PATIENT -    MED/ INTERNAL  Last Visit: 05-      VIRTUAL      MED/ PEDS      Ivory Lacy  Next Visit: None Scheduled  None         None Found                                                            Last  Test          Frequency    Reason                     Performed    Due Date  --------------------------------------------------------------------------------    CBC.........  12 months..  fenofibrate..............  05- 04-    CMP.........  12 months..  atorvastatin,              11-   10-                             empagliflozin,                             ergocalciferol,                             fenofibrate, losartan....    HBA1C.......  6 months...  empagliflozin,             11- 04-                             tirzepatide..............    Lipid Panel.  12 months..  atorvastatin, fenofibrate  05- 04-    Vitamin D...  12 months..  ergocalciferol...........  Not Found    Overdue    [employer] Embedded Care Due Messages. Reference number: [id number].   8/14/2024 6:13:57 AM CDT

## 2024-08-14 NOTE — TELEPHONE ENCOUNTER
Refill Routing Note   Medication(s) are not appropriate for processing by [facility] for the following reason(s):        Required labs outdated    [facility] action(s):  Defer     Requires labs : Yes             Appointments  past 12m or future 3m with PCP    Date Provider   Last Visit   5/6/2024 Ivory Lacy MD   Next Visit   Visit date not found Ivory Lacy MD   ED visits in past 90 days: 0        Note composed:12:05 PM 08/14/2024

## 2024-09-12 DIAGNOSIS — E78.2 MIXED HYPERLIPIDEMIA: ICD-10-CM

## 2024-09-12 DIAGNOSIS — L65.9 HAIR LOSS: ICD-10-CM

## 2024-09-12 NOTE — TELEPHONE ENCOUNTER
Refill Routing Note   Medication(s) are not appropriate for processing by [facility] for the following reason(s):        Required labs outdated  Outside of protocol    [facility] action(s):  Defer  Route               Appointments  past 12m or future 3m with PCP    Date Provider   Last Visit   5/6/2024 Ivory Lacy MD   Next Visit   Visit date not found Ivory Lacy MD   ED visits in past 90 days: 0        Note composed:2:54 PM 09/12/2024

## 2024-09-12 NOTE — TELEPHONE ENCOUNTER
No care due was identified.  [facility] Embedded Care Due Messages. Reference number: [id number].   9/12/2024 2:11:54 PM CDT   Yes - the patient is able to be screened

## 2024-09-13 RX ORDER — ATORVASTATIN CALCIUM 20 MG/1
20 TABLET, FILM COATED ORAL
Qty: 90 TABLET | Refills: 0 | Status: SHIPPED | OUTPATIENT
Start: 2024-09-13

## 2024-09-13 RX ORDER — MINOXIDIL 2.5 MG/1
2.5 TABLET ORAL
Qty: 30 TABLET | Refills: 3 | Status: SHIPPED | OUTPATIENT
Start: 2024-09-13

## 2024-09-27 ENCOUNTER — OFFICE VISIT (OUTPATIENT)
Dept: FAMILY MEDICINE | Facility: CLINIC | Age: 44
End: 2024-09-27
Payer: COMMERCIAL

## 2024-09-27 VITALS
BODY MASS INDEX: 28.19 KG/M2 | WEIGHT: 165.13 LBS | HEIGHT: 64 IN | HEART RATE: 95 BPM | DIASTOLIC BLOOD PRESSURE: 64 MMHG | TEMPERATURE: 99 F | SYSTOLIC BLOOD PRESSURE: 116 MMHG | OXYGEN SATURATION: 97 %

## 2024-09-27 DIAGNOSIS — B37.31 RECURRENT CANDIDIASIS OF VAGINA: Primary | ICD-10-CM

## 2024-09-27 DIAGNOSIS — E11.9 TYPE 2 DIABETES MELLITUS WITHOUT COMPLICATION, WITHOUT LONG-TERM CURRENT USE OF INSULIN: ICD-10-CM

## 2024-09-27 DIAGNOSIS — E78.2 MIXED HYPERLIPIDEMIA: ICD-10-CM

## 2024-09-27 DIAGNOSIS — R11.0 NAUSEA: ICD-10-CM

## 2024-09-27 DIAGNOSIS — F31.9 BIPOLAR AFFECTIVE DISORDER, REMISSION STATUS UNSPECIFIED: ICD-10-CM

## 2024-09-27 PROCEDURE — 99999 PR PBB SHADOW E&M-EST. PATIENT-LVL IV: CPT | Mod: PBBFAC,,, | Performed by: INTERNAL MEDICINE

## 2024-09-27 RX ORDER — LAMOTRIGINE 25 MG/1
TABLET ORAL
COMMUNITY
Start: 2024-08-26 | End: 2024-09-27 | Stop reason: SDUPTHER

## 2024-09-27 RX ORDER — ONDANSETRON 4 MG/1
4 TABLET, ORALLY DISINTEGRATING ORAL EVERY 8 HOURS PRN
Qty: 6 TABLET | Refills: 0 | Status: SHIPPED | OUTPATIENT
Start: 2024-09-27 | End: 2024-09-27

## 2024-09-27 RX ORDER — ONDANSETRON 4 MG/1
4 TABLET, ORALLY DISINTEGRATING ORAL EVERY 8 HOURS PRN
Qty: 20 TABLET | Refills: 0 | Status: SHIPPED | OUTPATIENT
Start: 2024-09-27

## 2024-09-27 RX ORDER — LISINOPRIL AND HYDROCHLOROTHIAZIDE 10; 12.5 MG/1; MG/1
1 TABLET ORAL DAILY
COMMUNITY
End: 2024-09-27

## 2024-09-27 RX ORDER — ESCITALOPRAM OXALATE 10 MG/1
10 TABLET ORAL
COMMUNITY
Start: 2024-09-04

## 2024-09-27 RX ORDER — CLONAZEPAM 0.5 MG/1
TABLET ORAL
COMMUNITY

## 2024-09-27 RX ORDER — LAMOTRIGINE 25 MG/1
25 TABLET ORAL 2 TIMES DAILY
Qty: 14 TABLET | Refills: 0 | Status: SHIPPED | OUTPATIENT
Start: 2024-09-27

## 2024-09-27 RX ORDER — METFORMIN HYDROCHLORIDE 1000 MG/1
1 TABLET ORAL 2 TIMES DAILY WITH MEALS
COMMUNITY
End: 2024-09-27

## 2024-09-27 RX ORDER — FLUCONAZOLE 100 MG/1
100 TABLET ORAL
COMMUNITY
Start: 2024-05-28

## 2024-09-27 RX ORDER — TERCONAZOLE 4 MG/G
CREAM VAGINAL 2 TIMES DAILY
COMMUNITY

## 2024-09-27 NOTE — PROGRESS NOTES
This note was created by combination of typed  and M-Modal dictation.  Transcription errors may be present.   This note was also generated with the assistance of ambient listening technology. Verbal consent was obtained by the patient and accompanying visitor(s) for the recording of patient appointment to facilitate this note. I attest to having reviewed and edited the generated note for accuracy, though some syntax or spelling errors may persist. Please contact the author of this note for any clarification.    Assessment and Plan:   Assessment and Plan    Recurrent candidiasis of vagina  Type 2 diabetes mellitus without complication, without long-term current use of insulin  -has been getting recurrent yeast infection she thinks for the past 6 months.  She suspects this maybe due to the Jardiance and this is a possible side effect.  Stop the Jardiance   Stay on Mounjaro.  Needs a refill, updated.    Due for labs, update baseline labs including urine microalbumin.  Previously urine microalbumin a few years ago was very high.  Check baseline today and would repeat again off of Jardiance in about 3 months.  On low-dose ARB.  Has room to increase the Mounjaro if A1c goes up off of Jardiance  -     Comprehensive Metabolic Panel; Future; Expected date: 09/27/2024  -     Lipid Panel; Future; Expected date: 09/27/2024  -     Hemoglobin A1C; Future; Expected date: 09/27/2024  -     Microalbumin/Creatinine Ratio, Urine; Future; Expected date: 09/27/2024  -     CBC Without Differential; Future; Expected date: 09/27/2024  -     Microalbumin/Creatinine Ratio, Urine; Future; Expected date: 12/27/2024  -     tirzepatide 10 mg/0.5 mL PnIj; Inject 10 mg into the skin every 7 days.  Dispense: 4 Pen; Refill: 5    Bipolar affective disorder, remission status unspecified  Nausea  -was started on Lamictal once daily and has a progress to twice daily but does not have an updated prescription and psychiatrist's office is closed.   I have sent in a temporary script for Lamictal 25 mg b.i.d. for 1 week with further refills to be with her psychiatrist and she has a follow up with her coming next week  Notes intermittent nausea.  She wonders if this maybe a side effect of the medications.  Refilled Zofran for p.r.n. use  -     lamoTRIgine (LAMICTAL) 25 MG tablet; Take 1 tablet (25 mg total) by mouth 2 (two) times daily.  Dispense: 14 tablet; Refill: 0  -     ondansetron (ZOFRAN-ODT) 4 MG TbDL; Take 1 tablet (4 mg total) by mouth every 8 (eight) hours as needed (nausea).  Dispense: 20 tablet; Refill: 0    Mixed hyperlipidemia  -check labs on fenofibrate and atorvastatin    Medications Discontinued During This Encounter   Medication Reason    FLUoxetine 10 MG capsule Patient no longer taking    fluticasone propionate (FLONASE) 50 mcg/actuation nasal spray Patient no longer taking    lurasidone (LATUDA) 80 mg Tab tablet Patient no longer taking    metFORMIN (GLUCOPHAGE) 1000 MG tablet Patient no longer taking    tirzepatide (MOUNJARO) 5 mg/0.5 mL PnIj Patient no longer taking    traZODone (DESYREL) 50 MG tablet Patient no longer taking       meds sent this encounter:  Medications Ordered This Encounter   Medications    lamoTRIgine (LAMICTAL) 25 MG tablet     Sig: Take 1 tablet (25 mg total) by mouth 2 (two) times daily.     Dispense:  14 tablet     Refill:  0     Further refills with psychiatry    ondansetron (ZOFRAN-ODT) 4 MG TbDL     Sig: Take 1 tablet (4 mg total) by mouth every 8 (eight) hours as needed (nausea).     Dispense:  20 tablet     Refill:  0    tirzepatide 10 mg/0.5 mL PnIj     Sig: Inject 10 mg into the skin every 7 days.     Dispense:  4 Pen     Refill:  5         Follow Up:  Follow up with PCP 3-4 months with pre visit labs  Future Appointments   Date Time Provider Department [place]   9/28/2024 11:30 AM TOO GRIFFITH   9/28/2024 11:45 AM VICKY GARAY SPECLAB [place]   1/17/2025  9:00 AM LAB, [facility]  LAB [place]   1/17/2025  9:15 AM SPECIMEN, [facility] SPECLAB [place]   1/24/2025  3:00 PM Ivory Lacy MD [facility] [place]         Subjective:   Subjective   Chief Complaint   Patient presents with    Diabetes    Recurrent yeast infection       SJ Kincaid is a 43 y.o. female.    Social History     Tobacco Use    Smoking status: Every Day     Current packs/day: 1.00     Average packs/day: 1 pack/day for 1.7 years (1.7 ttl pk-yrs)     Types: Cigarettes     Start date: 1/1/2023    Smokeless tobacco: Never   Substance Use Topics    Alcohol use: Yes     Alcohol/week: 2.5 standard drinks of alcohol     Types: 3 Standard drinks or equivalent per week     Comment: Just during special occasions          Social History     Social History Narrative    Not on file       Patient Care Team:  Ivory Lacy MD as PCP - General (Family Medicine)  Mely Escoto NP as Nurse Practitioner (Gastroenterology)  Jonah Acharya OD as Consulting Physician (Ophthalmology)  Jael Bellamy MA as Care Coordinator    Patient's last menstrual period was 02/12/2009.    Last appointment with this clinic was 5/6/2024. Last visit with me Visit date not found   To summarize last visit and events leading up to today:  New to me  DM2, mounjaro, jardiance  Lipid, statin, fenofibrate  Bipolar, psychiatry  Hair loss, minoxidil    Lab Results   Component Value Date     11/01/2023    K 3.5 11/01/2023     11/01/2023    CO2 24 11/01/2023    BUN 14 11/01/2023    CREATININE 0.8 11/01/2023     (H) 11/01/2023    CALCIUM 9.6 11/01/2023    EGFRNORACEVR >60.0 11/01/2023       Lab Results   Component Value Date    HGBA1C 6.0 (H) 11/01/2023     Lab Results   Component Value Date    CHOL 128 05/02/2023    TRIG 385 (H) 05/02/2023    HDL 20 (L) 05/02/2023    LDLCALC 31.0 (L) 05/02/2023    NONHDLCHOL 108 05/02/2023       Today's visit:    History of Present Illness    The patient works in a pharmacy.    Codi reports recurring yeast  infections for approximately 6 months or longer. These infections have been persistent, with symptoms resolving for about 3 weeks before recurring. A gynecologist suggested her medications, specifically Jardiance, could be causing the frequent infections. The patient's research also indicated Jardiance as a potential cause.    The patient has been taking Jardiance since 2020 and Mounjaro since 2023 for diabetes management.  For her yeast infection she has most recently used OTC Monistat and has a prescription for Terconazole. The patient also tried boric acid supplements twice weekly as recommended by her doctor, but is uncertain about the appropriate frequency of use.    The patient specifies that these are Candida glabrata infections. She describes the recurring nature of the infections as highly disruptive, necessitating repeated medication use.    Patient reports a recent episode of numbness in one foot lasting approximately 5 days, during which she had significant difficulty sensing her foot while walking. This symptom has since resolved.    The patient also mentions occasional nausea, which she attributes to her antidepressants. She was previously on Latuda but discontinued due to tardive dyskinesia, which caused involuntary tongue movements. She is now trying Lamictal for bipolar disorder management.    The patient denies having high blood pressure, current numbness, burning, or tingling in her feet.    The patient is on Jardiance 10 mg daily orally since 2020 and Mounjaro since 2023, both for diabetes. She is also on Losartan for kidney protection related to diabetes. For cholesterol management, she takes Atorvastatin and Fenofibrate. She is on Pantoprazole (Protonix) for stomach acid and recently started Minoxidil for hair. The patient takes Vitamin D once weekly orally and is on Lisinopril. For bipolar disorder, she takes Lamictal (Lamotrigine) 25 mg twice daily. She is on Lexapro for depression and  Klonopin. For yeast infection, she uses Terconazole as needed, takes Boric acid supplements twice weekly, and is currently using OTC Monistat.    ROS:  Gastrointestinal: reports nausea  Neurological: reports numbness, no tingling, reports tremors             ALLERGIES AND MEDICATIONS: updated and reviewed.  Medication List with Changes/Refills   Current Medications    ATORVASTATIN (LIPITOR) 20 MG TABLET    TAKE 1 TABLET BY MOUTH ONCE DAILY    BLOOD SUGAR DIAGNOSTIC STRP    To check BG one times daily, to use with insurance preferred meter    BLOOD-GLUCOSE METER KIT    To check BG one times daily, to use with insurance preferred meter    CLONAZEPAM (KLONOPIN) 0.5 MG TABLET        EMPAGLIFLOZIN (JARDIANCE) 10 MG TABLET    Take 1 tablet (10 mg total) by mouth once daily.    ERGOCALCIFEROL (ERGOCALCIFEROL) 50,000 UNIT CAP    TAKE 1 CAPSULE BY MOUTH ONCE WEEKLY    ESCITALOPRAM OXALATE (LEXAPRO) 10 MG TABLET    Take 10 mg by mouth.    FENOFIBRATE MICRONIZED (LOFIBRA) 134 MG CAP    TAKE 1 CAPSULE BY MOUTH ONCE DAILY    FLUCONAZOLE (DIFLUCAN) 100 MG TABLET    Take 100 mg by mouth.    LAMOTRIGINE (LAMICTAL) 25 MG TABLET    Take by mouth.    LANCETS MISC    To check BG one times daily, to use with insurance preferred meter    LISINOPRIL-HYDROCHLOROTHIAZIDE (PRINZIDE,ZESTORETIC) 10-12.5 MG PER TABLET    Take 1 tablet by mouth once daily.    LOSARTAN (COZAAR) 25 MG TABLET    TAKE 1 TABLET BY MOUTH ONCE DAILY    MINOXIDIL (LONITEN) 2.5 MG TABLET    TAKE 1 TABLET BY MOUTH ONCE DAILY    ONDANSETRON (ZOFRAN-ODT) 4 MG TBDL    Take 1 tablet (4 mg total) by mouth every 8 (eight) hours as needed (nausea).    PANTOPRAZOLE (PROTONIX) 40 MG TABLET    TAKE 1 TABLET BY MOUTH TWICE DAILY    TERCONAZOLE (TERAZOL 7) 0.4 % CREA    2 (two) times daily.    TIRZEPATIDE 10 MG/0.5 ML PNIJ    Inject 10 mg into the skin every 7 days.   Discontinued Medications    FLUOXETINE 10 MG CAPSULE    Take 10 mg by mouth.    FLUTICASONE PROPIONATE (FLONASE) 50  "MCG/ACTUATION NASAL SPRAY    1 spray (50 mcg total) by Each Nostril route once daily.    LURASIDONE (LATUDA) 80 MG TAB TABLET    Take 80 mg by mouth once daily.    METFORMIN (GLUCOPHAGE) 1000 MG TABLET    Take 1 tablet by mouth 2 (two) times daily with meals.    TIRZEPATIDE (MOUNJARO) 5 MG/0.5 ML PNIJ    Inject 5 mg into the skin every 7 days.    TRAZODONE (DESYREL) 50 MG TABLET    TAKE 1/2 TO 1 TABLET BY MOUTH ONCE DAILY AT BEDTIME         Objective:   Objective   Physical Exam   Vitals:    09/27/24 1534   BP: 116/64   BP Location: Right arm   Patient Position: Sitting   BP Method: Medium (Manual)   Pulse: 95   Temp: 99 °F (37.2 °C)   TempSrc: Oral   SpO2: 97%   Weight: 74.9 kg (165 lb 2 oz)   Height: 5' 4" (1.626 m)    Body mass index is 28.34 kg/m².  Weight: 74.9 kg (165 lb 2 oz)   Height: 5' 4" (162.6 cm)     Physical Exam  Constitutional:       General: She is not in acute distress.     Appearance: She is well-developed.   HENT:      Head: Normocephalic and atraumatic.   Eyes:      General: No scleral icterus.  Pulmonary:      Effort: Pulmonary effort is normal.   Musculoskeletal:      Right lower leg: No edema.      Left lower leg: No edema.   Feet:      Right foot:      Toenail Condition: Right toenails are ingrown.      Left foot:      Toenail Condition: Left toenails are ingrown.      Comments: Ingrown great toenails without induration or erythema or skin break  Skin:     General: Skin is warm and dry.   Neurological:      Mental Status: She is alert and oriented to person, place, and time.   Psychiatric:         Behavior: Behavior normal.         Thought Content: Thought content normal.                "

## 2024-09-28 ENCOUNTER — LAB VISIT (OUTPATIENT)
Dept: LAB | Facility: HOSPITAL | Age: 44
End: 2024-09-28
Attending: INTERNAL MEDICINE
Payer: COMMERCIAL

## 2024-09-28 DIAGNOSIS — E11.9 TYPE 2 DIABETES MELLITUS WITHOUT COMPLICATION, WITHOUT LONG-TERM CURRENT USE OF INSULIN: ICD-10-CM

## 2024-09-28 LAB
ALBUMIN SERPL BCP-MCNC: 4.2 G/DL (ref 3.5–5.2)
ALP SERPL-CCNC: 71 U/L (ref 55–135)
ALT SERPL W/O P-5'-P-CCNC: 23 U/L (ref 10–44)
ANION GAP SERPL CALC-SCNC: 9 MMOL/L (ref 8–16)
AST SERPL-CCNC: 19 U/L (ref 10–40)
BILIRUB SERPL-MCNC: 0.4 MG/DL (ref 0.1–1)
BUN SERPL-MCNC: 11 MG/DL (ref 6–20)
CALCIUM SERPL-MCNC: 9.2 MG/DL (ref 8.7–10.5)
CHLORIDE SERPL-SCNC: 106 MMOL/L (ref 95–110)
CHOLEST SERPL-MCNC: 104 MG/DL (ref 120–199)
CHOLEST/HDLC SERPL: 3.7 {RATIO} (ref 2–5)
CO2 SERPL-SCNC: 24 MMOL/L (ref 23–29)
CREAT SERPL-MCNC: 0.7 MG/DL (ref 0.5–1.4)
ERYTHROCYTE [DISTWIDTH] IN BLOOD BY AUTOMATED COUNT: 12.4 % (ref 11.5–14.5)
EST. GFR  (NO RACE VARIABLE): >60 ML/MIN/1.73 M^2
ESTIMATED AVG GLUCOSE: 123 MG/DL (ref 68–131)
GLUCOSE SERPL-MCNC: 147 MG/DL (ref 70–110)
HBA1C MFR BLD: 5.9 % (ref 4–5.6)
HCT VFR BLD AUTO: 49.2 % (ref 37–48.5)
HDLC SERPL-MCNC: 28 MG/DL (ref 40–75)
HDLC SERPL: 26.9 % (ref 20–50)
HGB BLD-MCNC: 15.8 G/DL (ref 12–16)
LDLC SERPL CALC-MCNC: 50 MG/DL (ref 63–159)
MCH RBC QN AUTO: 30.9 PG (ref 27–31)
MCHC RBC AUTO-ENTMCNC: 32.1 G/DL (ref 32–36)
MCV RBC AUTO: 96 FL (ref 82–98)
NONHDLC SERPL-MCNC: 76 MG/DL
PLATELET # BLD AUTO: 297 K/UL (ref 150–450)
PMV BLD AUTO: 9.2 FL (ref 9.2–12.9)
POTASSIUM SERPL-SCNC: 4 MMOL/L (ref 3.5–5.1)
PROT SERPL-MCNC: 7 G/DL (ref 6–8.4)
RBC # BLD AUTO: 5.12 M/UL (ref 4–5.4)
SODIUM SERPL-SCNC: 139 MMOL/L (ref 136–145)
TRIGL SERPL-MCNC: 130 MG/DL (ref 30–150)
WBC # BLD AUTO: 9.26 K/UL (ref 3.9–12.7)

## 2024-09-28 PROCEDURE — 80061 LIPID PANEL: CPT | Performed by: INTERNAL MEDICINE

## 2024-09-28 PROCEDURE — 80053 COMPREHEN METABOLIC PANEL: CPT | Performed by: INTERNAL MEDICINE

## 2024-09-28 PROCEDURE — 83036 HEMOGLOBIN GLYCOSYLATED A1C: CPT | Performed by: INTERNAL MEDICINE

## 2024-09-28 PROCEDURE — 85027 COMPLETE CBC AUTOMATED: CPT | Performed by: INTERNAL MEDICINE

## 2024-10-04 ENCOUNTER — TELEPHONE (OUTPATIENT)
Dept: PHARMACY | Facility: CLINIC | Age: 44
End: 2024-10-04
Payer: COMMERCIAL

## 2024-10-04 NOTE — TELEPHONE ENCOUNTER
[facility]/Population Health Chart Review & Patient Outreach Details For Medication Adherence Project    Reason for Outreach Encounter: 3rd Party payor non-compliance report ([employer], [employer], C, etc)  2.  Patient Outreach Method: Reviewed patient chart   3.   Medication in question:   Hypertension Medications               losartan (COZAAR) 25 MG tablet TAKE 1 TABLET BY MOUTH ONCE DAILY    minoxidiL (LONITEN) 2.5 MG tablet TAKE 1 TABLET BY MOUTH ONCE DAILY               LAST FILLED losartan: 9/27/24 for 30 day supply  4.  Reviewed and or Updates Made To: Patient Chart  5. Outreach Outcomes and/or actions taken: Patient filled medication and is on track to be adherent  Additional Notes:

## 2024-11-13 DIAGNOSIS — Z12.31 OTHER SCREENING MAMMOGRAM: ICD-10-CM

## 2024-11-18 ENCOUNTER — PATIENT MESSAGE (OUTPATIENT)
Dept: ADMINISTRATIVE | Facility: HOSPITAL | Age: 44
End: 2024-11-18
Payer: COMMERCIAL

## 2024-12-14 ENCOUNTER — TELEPHONE (OUTPATIENT)
Dept: PHARMACY | Facility: CLINIC | Age: 44
End: 2024-12-14
Payer: COMMERCIAL

## 2024-12-14 NOTE — TELEPHONE ENCOUNTER
[facility]/Population Health Chart Review & Patient Outreach Details For Medication Adherence Project    Reason for Outreach Encounter: 3rd Party payor non-compliance report ([employer], [employer], C, etc)  2.  Patient Outreach Method: Reviewed Patient Chart  3.   Medication in question: losartan    LAST FILLED: 11/22/24 for 30 day supply  Hypertension Medications               losartan (COZAAR) 25 MG tablet TAKE 1 TABLET BY MOUTH ONCE DAILY    minoxidiL (LONITEN) 2.5 MG tablet TAKE 1 TABLET BY MOUTH ONCE DAILY              4.  Reviewed and or Updates Made To: Patient Chart  5. Outreach Outcomes and/or actions taken: Patient filled medication and is on track to be adherent

## 2024-12-18 DIAGNOSIS — E78.2 MIXED HYPERLIPIDEMIA: ICD-10-CM

## 2024-12-18 RX ORDER — ATORVASTATIN CALCIUM 20 MG/1
20 TABLET, FILM COATED ORAL DAILY
Qty: 90 TABLET | Refills: 1 | Status: SHIPPED | OUTPATIENT
Start: 2024-12-18

## 2024-12-18 NOTE — TELEPHONE ENCOUNTER
Care Due:                  Date            Visit Type   Department     Provider  --------------------------------------------------------------------------------                                [facility]                              PRIMARY      MED/ INTERNAL  Last Visit: 09-      CARE ([facility])   MED/ PEDS      Jasper Newell                              [facility]                              PRIMARY      MED/ INTERNAL  Next Visit: 01-      CARE ([facility])   MED/ PEDS      Ivory Lacy                                                            Last  Test          Frequency    Reason                     Performed    Due Date  --------------------------------------------------------------------------------    Vitamin D...  12 months..  ergocalciferol...........  Not Found    Overdue    [employer] Embedded Care Due Messages. Reference number: [id number].   12/18/2024 4:01:07 PM CST

## 2024-12-19 NOTE — TELEPHONE ENCOUNTER
Refill Decision Note   Codi Wilkerson  is requesting a refill authorization.  Brief Assessment and Rationale for Refill:  Approve     Medication Therapy Plan:         Comments:     Note composed:7:09 PM 12/18/2024

## 2024-12-26 DIAGNOSIS — E78.5 HYPERLIPIDEMIA, UNSPECIFIED HYPERLIPIDEMIA TYPE: ICD-10-CM

## 2024-12-26 RX ORDER — FENOFIBRATE 134 MG/1
CAPSULE ORAL
Qty: 90 CAPSULE | Refills: 1 | Status: SHIPPED | OUTPATIENT
Start: 2024-12-26

## 2024-12-26 NOTE — TELEPHONE ENCOUNTER
No care due was identified.  [facility] Embedded Care Due Messages. Reference number: [id number].   12/26/2024 3:34:27 PM CST

## 2024-12-27 DIAGNOSIS — E11.9 TYPE 2 DIABETES MELLITUS WITHOUT COMPLICATION, WITHOUT LONG-TERM CURRENT USE OF INSULIN: ICD-10-CM

## 2024-12-27 RX ORDER — LOSARTAN POTASSIUM 25 MG/1
25 TABLET ORAL
Qty: 90 TABLET | Refills: 1 | Status: SHIPPED | OUTPATIENT
Start: 2024-12-27

## 2024-12-27 NOTE — TELEPHONE ENCOUNTER
Refill Decision Note   Codi Wilkerson  is requesting a refill authorization.  Brief Assessment and Rationale for Refill:  Approve     Medication Therapy Plan:         Comments:     Note composed:9:07 PM 12/26/2024

## 2024-12-27 NOTE — TELEPHONE ENCOUNTER
Care Due:                  Date            Visit Type   Department     Provider  --------------------------------------------------------------------------------                                [facility]                              PRIMARY      MED/ INTERNAL  Last Visit: 09-      CARE ([facility])   MED/ PEDS      Jasper Newell                              [facility]                              PRIMARY      MED/ INTERNAL  Next Visit: 01-      CARE ([facility])   MED/ PEDS      Ivory Lacy                                                            Last  Test          Frequency    Reason                     Performed    Due Date  --------------------------------------------------------------------------------    HBA1C.......  6 months...  tirzepatide..............  09- 03-    [employer] Embedded Care Due Messages. Reference number: [id number].   12/27/2024 3:07:38 PM CST

## 2024-12-28 NOTE — TELEPHONE ENCOUNTER
Refill Decision Note   Codi Rock  is requesting a refill authorization.  Brief Assessment and Rationale for Refill:  Approve     Medication Therapy Plan:  OREN; FLOS      Comments:     Note composed:8:39 PM 12/27/2024

## 2024-12-31 ENCOUNTER — HOSPITAL ENCOUNTER (OUTPATIENT)
Dept: RADIOLOGY | Facility: HOSPITAL | Age: 44
Discharge: HOME | End: 2024-12-31
Attending: FAMILY MEDICINE
Payer: COMMERCIAL

## 2024-12-31 DIAGNOSIS — Z12.31 OTHER SCREENING MAMMOGRAM: ICD-10-CM

## 2024-12-31 PROCEDURE — 77063 BREAST TOMOSYNTHESIS BI: CPT | Mod: TC,PO

## 2025-01-06 ENCOUNTER — PATIENT OUTREACH (OUTPATIENT)
Dept: ADMINISTRATIVE | Facility: HOSPITAL | Age: 45
End: 2025-01-06
Payer: COMMERCIAL

## 2025-01-08 ENCOUNTER — OFFICE VISIT (OUTPATIENT)
Dept: URGENT CARE | Facility: CLINIC | Age: 45
End: 2025-01-08
Payer: COMMERCIAL

## 2025-01-08 VITALS
HEIGHT: 64 IN | SYSTOLIC BLOOD PRESSURE: 137 MMHG | HEART RATE: 101 BPM | TEMPERATURE: 99 F | BODY MASS INDEX: 28.17 KG/M2 | OXYGEN SATURATION: 97 % | RESPIRATION RATE: 18 BRPM | DIASTOLIC BLOOD PRESSURE: 93 MMHG | WEIGHT: 165 LBS

## 2025-01-08 DIAGNOSIS — N30.00 ACUTE CYSTITIS WITHOUT HEMATURIA: Primary | ICD-10-CM

## 2025-01-08 DIAGNOSIS — R11.0 NAUSEA: ICD-10-CM

## 2025-01-08 DIAGNOSIS — R10.2 VAGINAL PAIN: ICD-10-CM

## 2025-01-08 DIAGNOSIS — R30.0 DYSURIA: ICD-10-CM

## 2025-01-08 DIAGNOSIS — N76.0 VAGINOSIS: ICD-10-CM

## 2025-01-08 LAB
BILIRUBIN, UA POC OHS: NEGATIVE
BLOOD, UA POC OHS: NEGATIVE
CLARITY, UA POC OHS: ABNORMAL
COLOR, UA POC OHS: ABNORMAL
GLUCOSE, UA POC OHS: 250
KETONES, UA POC OHS: NEGATIVE
LEUKOCYTES, UA POC OHS: NEGATIVE
NITRITE, UA POC OHS: NEGATIVE
PH, UA POC OHS: 6
PROTEIN, UA POC OHS: 30
SPECIFIC GRAVITY, UA POC OHS: 1.02
UROBILINOGEN, UA POC OHS: 0.2

## 2025-01-08 PROCEDURE — 81003 URINALYSIS AUTO W/O SCOPE: CPT | Mod: QW,S$GLB,,

## 2025-01-08 PROCEDURE — 99214 OFFICE O/P EST MOD 30 MIN: CPT | Mod: S$GLB,,,

## 2025-01-08 PROCEDURE — 81515 NFCT DS BV&VAGINITIS DNA ALG: CPT

## 2025-01-08 PROCEDURE — 87086 URINE CULTURE/COLONY COUNT: CPT

## 2025-01-08 RX ORDER — TERCONAZOLE 4 MG/G
1 CREAM VAGINAL NIGHTLY
Qty: 45 G | Refills: 0 | Status: SHIPPED | OUTPATIENT
Start: 2025-01-08 | End: 2025-01-15

## 2025-01-08 RX ORDER — PHENAZOPYRIDINE HYDROCHLORIDE 200 MG/1
200 TABLET, FILM COATED ORAL 3 TIMES DAILY PRN
Qty: 6 TABLET | Refills: 0 | Status: SHIPPED | OUTPATIENT
Start: 2025-01-08 | End: 2025-01-08 | Stop reason: CLARIF

## 2025-01-08 RX ORDER — ONDANSETRON 4 MG/1
4 TABLET, ORALLY DISINTEGRATING ORAL EVERY 6 HOURS PRN
Qty: 10 TABLET | Refills: 0 | Status: SHIPPED | OUTPATIENT
Start: 2025-01-08

## 2025-01-08 RX ORDER — NITROFURANTOIN 25; 75 MG/1; MG/1
100 CAPSULE ORAL 2 TIMES DAILY
Qty: 10 CAPSULE | Refills: 0 | Status: SHIPPED | OUTPATIENT
Start: 2025-01-08 | End: 2025-01-13

## 2025-01-08 NOTE — PATIENT INSTRUCTIONS
Results in 2-3 days   Yeast Infection: Terconazole nightly as directed  UTI: Macrobid twice daily for 5 days   Please return here or go to the Emergency Department for any concerns or worsening of condition.  If you were prescribed antibiotics, please take them to completion.  If you were prescribed a narcotic medication, do not drive or operate heavy equipment or machinery while taking these medications.  Please follow up with your primary care doctor or specialist as needed.  Please drink plenty of fluids.  Please get plenty of rest.  If you were prescribed Pyridium (phenazopyridine), please be aware that if you wear contact lens that this medication may stain your contacts.  While taking this medication it is recommended that you do not wear your contacts until 24 hours after your last dose.  Please follow up with your primary care doctor or specialist as needed.    If you  smoke, please stop smoking.

## 2025-01-08 NOTE — PROGRESS NOTES
"Subjective:      Patient ID: Codi Wilkerson is a 44 y.o. female.    Vitals:  height is 5' 4" (1.626 m) and weight is 74.8 kg (165 lb). Her oral temperature is 99.2 °F (37.3 °C). Her blood pressure is 137/93 (abnormal) and her pulse is 101. Her respiration is 18 and oxygen saturation is 97%.     Chief Complaint: Urinary Tract Infection    Pt is a 43 yo female with PMHx of DMT2, HLD. She is presenting with dysuria, aching sensation in vaginal area.  Onset of symptoms was 2 weeks ago. Denies any flnak pain, hematuria, N/V, chills, myalgia, fever, vaginal itching, vaginal discharge. Pt reports using no OTC tx.    Urinary Tract Infection   This is a new problem. The current episode started 1 to 4 weeks ago. The problem has been gradually worsening. The quality of the pain is described as aching. There has been no fever. She is Sexually active. There is No history of pyelonephritis. Associated symptoms include frequency, nausea and urgency. Pertinent negatives include no chills, hematuria, vomiting, constipation or rash. Associated symptoms comments: Pinching pain, diarrhea  . She has tried nothing for the symptoms.       Constitution: Negative for activity change, appetite change, chills and fever.   HENT:  Negative for ear pain, congestion, postnasal drip, sinus pain, sinus pressure and sore throat.    Neck: Negative for neck pain.   Cardiovascular:  Negative for chest pain and sob on exertion.   Eyes:  Negative for eye trauma, eye discharge, eye itching, eye redness, photophobia and blurred vision.   Respiratory:  Negative for cough, shortness of breath, wheezing and asthma.    Gastrointestinal:  Positive for nausea. Negative for abdominal pain, vomiting, constipation and diarrhea.   Genitourinary:  Positive for frequency and urgency. Negative for dysuria, urine decreased and hematuria.   Musculoskeletal:  Negative for pain and muscle ache.   Skin:  Negative for color change, rash and hives.   Allergic/Immunologic: " Negative for seasonal allergies, asthma, hives and sneezing.   Neurological:  Negative for dizziness, light-headedness, headaches and altered mental status.   Psychiatric/Behavioral:  Negative for altered mental status and confusion.       Objective:     Physical Exam   Constitutional: She is oriented to person, place, and time. She appears well-developed.      Comments:Pt sitting erect on examination table. No acute respiratory distress, no use of accessory muscles, no notice of nasal flaring.        HENT:   Head: Normocephalic and atraumatic.   Ears:   Right Ear: External ear normal.   Left Ear: External ear normal.   Nose: Nose normal. No nasal deformity. No epistaxis.   Mouth/Throat: Oropharynx is clear and moist and mucous membranes are normal.   Eyes: Lids are normal.   Neck: Trachea normal and phonation normal. Neck supple.   Cardiovascular: Normal pulses.   Pulmonary/Chest: Effort normal. No accessory muscle usage. No apnea, no tachypnea and no bradypnea. No respiratory distress.   Lungs clear to auscultation B/L           Comments: Lungs clear to auscultation B/L      Abdominal: Normal appearance and bowel sounds are normal. She exhibits no distension. Soft. There is no abdominal tenderness. There is no left CVA tenderness and no right CVA tenderness.   Neurological: She is alert and oriented to person, place, and time.   Skin: Skin is warm, dry and intact.   Psychiatric: Her speech is normal and behavior is normal.   Nursing note and vitals reviewed.    Results for orders placed or performed in visit on 01/08/25   POCT Urinalysis(Instrument)    Collection Time: 01/08/25  5:23 PM   Result Value Ref Range    Color, POC UA Cynthia (A) Yellow, Straw, Colorless    Clarity, POC UA Slight Cloudy (A) Clear    Glucose, POC  (A) Negative    Bilirubin, POC UA Negative Negative    Ketones, POC UA Negative Negative    Spec Grav POC UA 1.025 1.005 - 1.030    Blood, POC UA Negative Negative    pH, POC UA 6.0 5.0 -  8.0    Protein, POC UA 30 (A) Negative    Urobilinogen, POC UA 0.2 <=1.0    Nitrite, POC UA Negative Negative    WBC, POC UA Negative Negative       Assessment:     1. Acute cystitis without hematuria    2. Vaginal pain    3. Dysuria    4. Vaginosis    5. Nausea        Plan:   I have reviewed the patient chart and pertinent past imaging/labs.      Acute cystitis without hematuria  -     nitrofurantoin, macrocrystal-monohydrate, (MACROBID) 100 MG capsule; Take 1 capsule (100 mg total) by mouth 2 (two) times daily. for 5 days  Dispense: 10 capsule; Refill: 0    Vaginal pain  -     POCT Urinalysis(Instrument)  -     Vaginosis Screen by DNA Probe; Future; Expected date: 01/08/2025  -     Urine Culture High Risk    Dysuria    Vaginosis  -     terconazole (TERAZOL 7) 0.4 % Crea; Place 1 applicator vaginally every evening. for 7 days  Dispense: 45 g; Refill: 0    Nausea  -     ondansetron (ZOFRAN-ODT) 4 MG TbDL; Take 1 tablet (4 mg total) by mouth every 6 (six) hours as needed.  Dispense: 10 tablet; Refill: 0    Other orders  -     Discontinue: phenazopyridine (PYRIDIUM) 200 MG tablet; Take 1 tablet (200 mg total) by mouth 3 (three) times daily as needed.  Dispense: 6 tablet; Refill: 0

## 2025-01-10 LAB
BACTERIA UR CULT: NORMAL
BACTERIA UR CULT: NORMAL

## 2025-01-14 DIAGNOSIS — B96.89 BACTERIAL VAGINOSIS: Primary | ICD-10-CM

## 2025-01-14 DIAGNOSIS — N76.0 BACTERIAL VAGINOSIS: Primary | ICD-10-CM

## 2025-01-14 RX ORDER — METRONIDAZOLE 500 MG/1
500 TABLET ORAL EVERY 12 HOURS
Qty: 14 TABLET | Refills: 0 | Status: SHIPPED | OUTPATIENT
Start: 2025-01-14 | End: 2025-01-21

## 2025-01-15 DIAGNOSIS — L65.9 HAIR LOSS: ICD-10-CM

## 2025-01-15 RX ORDER — MINOXIDIL 2.5 MG/1
2.5 TABLET ORAL
Qty: 30 TABLET | Refills: 3 | Status: SHIPPED | OUTPATIENT
Start: 2025-01-15

## 2025-01-15 NOTE — TELEPHONE ENCOUNTER
No care due was identified.  [facility] Embedded Care Due Messages. Reference number: [id number].   1/15/2025 11:49:40 AM CST

## 2025-01-16 ENCOUNTER — TELEPHONE (OUTPATIENT)
Dept: PHARMACY | Facility: CLINIC | Age: 45
End: 2025-01-16
Payer: COMMERCIAL

## 2025-01-17 NOTE — TELEPHONE ENCOUNTER
[facility]/Population Health Chart Review & Patient Outreach Details For Medication Adherence Project    Reason for Outreach Encounter: 3rd Party payor non-compliance report ([employer], [employer], C, etc)  2.  Patient Outreach Method: Reviewed patient chart   3.   Medication in question:    Hypertension Medications               losartan (COZAAR) 25 MG tablet TAKE 1 TABLET BY MOUTH ONCE DAILY    minoxidiL (LONITEN) 2.5 MG tablet TAKE 1 TABLET BY MOUTH ONCE DAILY                   last filled  12/19/24 for 30 day supply      4.  Reviewed and or Updates Made To: Patient Chart  5. Outreach Outcomes and/or actions taken: Patient filled medication and is on track to be adherent  Additional Notes:

## 2025-01-22 ENCOUNTER — TELEPHONE (OUTPATIENT)
Dept: SMOKING CESSATION | Facility: CLINIC | Age: 45
End: 2025-01-22
Payer: COMMERCIAL

## 2025-01-22 NOTE — TELEPHONE ENCOUNTER
Called patient about 12 month follow up. Left message for patient to call [phone]. Resolved quit episode.

## 2025-01-28 ENCOUNTER — OFFICE VISIT (OUTPATIENT)
Dept: FAMILY MEDICINE | Facility: CLINIC | Age: 45
End: 2025-01-28
Payer: COMMERCIAL

## 2025-01-28 VITALS
HEIGHT: 64 IN | OXYGEN SATURATION: 96 % | HEART RATE: 95 BPM | TEMPERATURE: 99 F | BODY MASS INDEX: 29.38 KG/M2 | WEIGHT: 172.06 LBS | SYSTOLIC BLOOD PRESSURE: 128 MMHG | DIASTOLIC BLOOD PRESSURE: 74 MMHG

## 2025-01-28 DIAGNOSIS — E11.9 TYPE 2 DIABETES MELLITUS WITHOUT COMPLICATION, WITHOUT LONG-TERM CURRENT USE OF INSULIN: ICD-10-CM

## 2025-01-28 DIAGNOSIS — A59.9 TRICHOMONAS VAGINALIS INFECTION: Primary | ICD-10-CM

## 2025-01-28 PROCEDURE — 3074F SYST BP LT 130 MM HG: CPT | Mod: CPTII,S$GLB,,

## 2025-01-28 PROCEDURE — 1160F RVW MEDS BY RX/DR IN RCRD: CPT | Mod: CPTII,S$GLB,,

## 2025-01-28 PROCEDURE — 3078F DIAST BP <80 MM HG: CPT | Mod: CPTII,S$GLB,,

## 2025-01-28 PROCEDURE — G2211 COMPLEX E/M VISIT ADD ON: HCPCS | Mod: S$GLB,,,

## 2025-01-28 PROCEDURE — 1159F MED LIST DOCD IN RCRD: CPT | Mod: CPTII,S$GLB,,

## 2025-01-28 PROCEDURE — 3008F BODY MASS INDEX DOCD: CPT | Mod: CPTII,S$GLB,,

## 2025-01-28 PROCEDURE — 99999 PR PBB SHADOW E&M-EST. PATIENT-LVL IV: CPT | Mod: PBBFAC,,,

## 2025-01-28 PROCEDURE — 99213 OFFICE O/P EST LOW 20 MIN: CPT | Mod: S$GLB,,,

## 2025-01-28 PROCEDURE — 87661 TRICHOMONAS VAGINALIS AMPLIF: CPT

## 2025-01-28 NOTE — PROGRESS NOTES
HPI     Codi Wilkerson is a 44 y.o. female with multiple medical diagnoses as listed in the medical history and problem list that presents for follow up on test results. PCP Dr. Lacy with last visit in this clinic on 9/27/24.     Chief Complaint   Patient presents with    Follow-up       HPI    History of Present Illness    CHIEF COMPLAINT:  Patient presents today for follow up of trichomonas infection.    GENITOURINARY/SEXUAL HEALTH:  She completed antibiotic treatment for trichomonas infection. She was asymptomatic at diagnosis, though initially experienced urinary discomfort. Her current sexual partner tested negative, and she reports sexual contact with a different partner several months ago. She currently denies vaginal discharge or foul odor.    MEDICAL HISTORY:  She has a history of recurrent yeast infections with multiple occurrences in the past year.. The yeast infections are attributed to Jardiance use, which she has subsequently discontinued. She reports these episodes typically present with urinary discomfort without pain or burning.    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         Assessment & Plan     1. Trichomonas vaginalis infection    Tested positive for trichomonas on 1/8/25. Treated with Flagyl, which patient completed. Would like to be retested today. Will obtain urine sample today. Discussed methods of transmission of and protection against STD's. Follow up with clinic for any worsening symptoms, or if symptoms fail to improve.       - Trichomonas vaginalis, RNA, Qual, Urine; Future  - Trichomonas vaginalis, RNA, Qual, Urine    2. Type 2 diabetes mellitus without complication, without long-term current use of insulin    Stable on Tirzepatide weekly. The current medical regimen is effective;  continue present plan and medications.  Avoid concentrated sweets, such as sugary drinks and foods. Continue exercising/healthy, active lifestyle.           Discussed DDx,  condition, and treatment.   Education sent to patient portal/included in after visit summary.  ED precautions given.   Notify provider if symptoms do not resolve or increase in severity.   Patient verbalizes understanding and agrees with plan of care.  --------------------------------------------      Health Maintenance:  Health Maintenance         Date Due Completion Date    Influenza Vaccine (1) 09/01/2024 10/2/2023    Override on 10/5/2020: Done (oct 2020 external)    Override on 10/1/2019: Done    COVID-19 Vaccine (4 - 2024-25 season) 09/01/2024 10/22/2021    Diabetic Eye Exam 01/19/2025 1/19/2024    Override on 4/12/2021: Done (Dr. Crump  - april 2021)    Hemoglobin A1c 03/28/2025 9/28/2024    Diabetes Urine Screening 09/28/2025 9/28/2024    Lipid Panel 09/28/2025 9/28/2024    Mammogram 12/31/2025 12/31/2024    Low Dose Statin 01/28/2026 1/28/2025    Foot Exam 01/28/2026 1/28/2025    Override on 5/2/2023: Done    Override on 2/3/2020: Done    TETANUS VACCINE 10/02/2033 10/2/2023    RSV Vaccine (Age 60+ and Pregnant patients) (1 - 1-dose 75+ series) 12/11/2056 ---            Discussed the importance of overdue vaccines which were offered during this encounter. Patient declined overdue vaccines at this time and Advised patient on the importance of completing overdue health maintenance items    Follow Up:  Follow up if symptoms worsen or fail to improve.    Exam     Review of Systems:  (as noted above)  Review of Systems   Constitutional:  Negative for fever.   HENT:  Negative for trouble swallowing.    Respiratory:  Negative for shortness of breath.    Cardiovascular:  Negative for chest pain.   Gastrointestinal:  Negative for blood in stool and vomiting.   Genitourinary:  Negative for hematuria.   Skin:  Negative for rash.       Physical Exam:   Physical Exam  Constitutional:       General: She is not in acute distress.     Appearance: Normal appearance. She is not ill-appearing.   HENT:      Head:  "Normocephalic and atraumatic.   Cardiovascular:      Rate and Rhythm: Normal rate and regular rhythm.      Pulses: Normal pulses.      Heart sounds: Normal heart sounds. No murmur heard.  Pulmonary:      Effort: Pulmonary effort is normal. No respiratory distress.      Breath sounds: Normal breath sounds. No wheezing.   Skin:     General: Skin is warm and dry.      Capillary Refill: Capillary refill takes less than 2 seconds.   Neurological:      General: No focal deficit present.      Mental Status: She is alert and oriented to person, place, and time.   Psychiatric:         Mood and Affect: Mood normal.         Behavior: Behavior normal.       Vitals:    25 1427   BP: 128/74   BP Location: Left arm   Patient Position: Sitting   Pulse: 95   Temp: 99.1 °F (37.3 °C)   TempSrc: Oral   SpO2: 96%   Weight: 78 kg (172 lb 1.1 oz)   Height: 5' 4" (1.626 m)      Body mass index is 29.54 kg/m².    Protective Sensation (w/ 10 gram monofilament):  Right: Intact  Left: Intact    Visual Inspection:  Normal -  Bilateral    Pedal Pulses:   Right: Present  Left: Present    Posterior Tibialis Pulses:   Right:Present  Left: Present     History     Past Medical History:  Past Medical History:   Diagnosis Date    Anxiety and depression     psychiatrist Dr. Casanova    Bilateral ovarian cysts 2017    Diabetes mellitus 2014    type 2    Elevated CEA 2017    Female pelvic pain 2017    GERD (gastroesophageal reflux disease)     HLD (hyperlipidemia)     Lesion of right lung 2017    Migraine headache     Right ovarian cyst 2017       Past Surgical History:  Past Surgical History:   Procedure Laterality Date     SECTION, LOW TRANSVERSE  ,     COLONOSCOPY N/A 2017    Procedure: COLONOSCOPY;  Surgeon: Phill Valencia MD;  Location: [address];  Service: Endoscopy;  Laterality: N/A; unremarkable, Repeat colonoscopy at regular screening age for screening purposes    COLONOSCOPY N/A " 12/20/2021    Procedure: COLONOSCOPY;  Surgeon: Kevin Conner MD;  Location: [facility] END[facility];  Service: Endoscopy;  Laterality: N/A;    ECTOPIC PREGNANCY SURGERY  2003    laparotomy     ESOPHAGOGASTRODUODENOSCOPY N/A 12/14/2022    Procedure: ESOPHAGOGASTRODUODENOSCOPY (EGD);  Surgeon: Maycol Mooney MD;  Location: [facility] ENDO;  Service: Endoscopy;  Laterality: N/A;    HYSTERECTOMY  2009    supracervical LH.     PARTIAL HYSTERECTOMY  3 years ago    SALPINGECTOMY Right 2003       Social History:  Social History     Socioeconomic History    Marital status:    Tobacco Use    Smoking status: Every Day     Current packs/day: 1.00     Average packs/day: 1 pack/day for 2.1 years (2.1 ttl pk-yrs)     Types: Cigarettes     Start date: 1/1/2023    Smokeless tobacco: Never   Substance and Sexual Activity    Alcohol use: Yes     Alcohol/week: 2.5 standard drinks of alcohol     Types: 3 Standard drinks or equivalent per week     Comment: Just during special occasions    Drug use: No    Sexual activity: Yes     Partners: Male     Birth control/protection: None     Social Drivers of Health     Financial Resource Strain: Patient Declined (5/6/2024)    Overall Financial Resource Strain (CARDIA)     Difficulty of Paying Living Expenses: Patient declined   Food Insecurity: Patient Declined (5/6/2024)    Hunger Vital Sign     Worried About Running Out of Food in the Last Year: Patient declined     Ran Out of Food in the Last Year: Patient declined   Transportation Needs: Patient Declined (5/6/2024)    PRAPARE - Transportation     Lack of Transportation (Medical): Patient declined     Lack of Transportation (Non-Medical): Patient declined   Physical Activity: Unknown (5/6/2024)    Exercise Vital Sign     Days of Exercise per Week: Patient declined   Stress: Patient Declined (5/6/2024)    [employer] [place] [employer] - Occupational Stress Questionnaire     Feeling of Stress : Patient declined   Housing Stability:  Unknown (5/6/2024)    Housing Stability Vital Sign     Unable to Pay for Housing in the Last Year: Patient declined       Family History:  Family History   Problem Relation Name Age of Onset    Depression Mother Abbey     Migraines Mother Abbey     Thyroid nodules Mother Abbey         benign    Hypertension Father João     Hyperlipidemia Father João     Hypertension Brother Damian     Heart disease Maternal Grandmother      Heart disease Maternal Grandfather      Hypertension Maternal Grandfather      Colon cancer Neg Hx      Esophageal cancer Neg Hx      Crohn's disease Neg Hx      Irritable bowel syndrome Neg Hx      Stomach cancer Neg Hx      Rectal cancer Neg Hx      Ulcerative colitis Neg Hx      Celiac disease Neg Hx         Allergies and Medications: (updated and reviewed)  Review of patient's allergies indicates:   Allergen Reactions    Indomethacin Rash    Morphine Itching     Current Outpatient Medications   Medication Sig Dispense Refill    atorvastatin (LIPITOR) 20 MG tablet Take 1 tablet (20 mg total) by mouth once daily. 90 tablet 1    blood sugar diagnostic Strp To check BG one times daily, to use with insurance preferred meter 200 strip 3    blood-glucose meter kit To check BG one times daily, to use with insurance preferred meter 1 each 0    clonazePAM (KLONOPIN) 0.5 MG tablet       ergocalciferol (ERGOCALCIFEROL) 50,000 unit Cap TAKE 1 CAPSULE BY MOUTH ONCE WEEKLY 12 capsule 3    EScitalopram oxalate (LEXAPRO) 10 MG tablet Take 10 mg by mouth.      fenofibrate micronized (LOFIBRA) 134 MG Cap TAKE 1 CAPSULE BY MOUTH ONCE DAILY 90 capsule 1    fluconazole (DIFLUCAN) 100 MG tablet Take 100 mg by mouth.      lamoTRIgine (LAMICTAL) 25 MG tablet Take 1 tablet (25 mg total) by mouth 2 (two) times daily. 14 tablet 0    lancets Misc To check BG one times daily, to use with insurance preferred meter 200 each 3    losartan (COZAAR) 25 MG tablet TAKE 1 TABLET BY MOUTH ONCE DAILY 90 tablet 1    minoxidiL  (LONITEN) 2.5 MG tablet TAKE 1 TABLET BY MOUTH ONCE DAILY 30 tablet 3    ondansetron (ZOFRAN-ODT) 4 MG TbDL Take 1 tablet (4 mg total) by mouth every 8 (eight) hours as needed (nausea). 20 tablet 0    ondansetron (ZOFRAN-ODT) 4 MG TbDL Take 1 tablet (4 mg total) by mouth every 6 (six) hours as needed. 10 tablet 0    pantoprazole (PROTONIX) 40 MG tablet TAKE 1 TABLET BY MOUTH TWICE DAILY 180 tablet 3    terconazole (TERAZOL 7) 0.4 % Crea 2 (two) times daily.      tirzepatide 10 mg/0.5 mL PnIj Inject 10 mg into the skin every 7 days. 4 Pen 5     No current facility-administered medications for this visit.       Patient Care Team:  Ivory Lacy MD as PCP - General (Family Medicine)  Mely Escoto NP as Nurse Practitioner (Gastroenterology)  Jonah Acharya OD as Consulting Physician (Ophthalmology)         - The patient is given an After Visit Summary that lists all medications with directions, allergies, education, orders placed during this encounter and follow-up instructions.      - I have reviewed the patient's medical information including past medical, family, and social history sections including the medications and allergies.      - We discussed the patient's current medications.     This note was created by combination of typed  and MModal dictation.  Transcription errors may be present.  If there are any questions, please contact me.                 RITA Treviño

## 2025-01-30 LAB
SPECIMEN SOURCE: NORMAL
T VAGINALIS RRNA SPEC QL NAA+PROBE: NEGATIVE

## 2025-02-25 ENCOUNTER — TELEPHONE (OUTPATIENT)
Dept: PHARMACY | Facility: CLINIC | Age: 45
End: 2025-02-25
Payer: COMMERCIAL

## 2025-02-25 NOTE — TELEPHONE ENCOUNTER
[facility]/Population [facility] Chart Review & Patient Outreach Details For Medication Adherence Project    Reason for Outreach Encounter: 3rd Party payor non-compliance report ([employer], [employer], [facility], etc)  2.  Patient Outreach Method: Totsyhart message  3.   Medication in question: losartan   LAST FILLED: 12/19/24 for 30 day supply  Hypertension Medications              losartan (COZAAR) 25 MG tablet TAKE 1 TABLET BY MOUTH ONCE DAILY    minoxidiL (LONITEN) 2.5 MG tablet TAKE 1 TABLET BY MOUTH ONCE DAILY              4.  Reviewed and or Updates Made To: Patient Chart  5. Outreach Outcomes and/or actions taken: Sent inquiry to patient: Waiting for response.

## 2025-03-16 ENCOUNTER — TELEPHONE (OUTPATIENT)
Dept: PHARMACY | Facility: CLINIC | Age: 45
End: 2025-03-16
Payer: COMMERCIAL

## 2025-03-17 NOTE — TELEPHONE ENCOUNTER
[facility]Population [facility] Chart Review & Patient Outreach Details For Medication Adherence Project    Reason for Outreach Encounter: 3rd Party payor non-compliance report ([employer], [employer], [facility], etc)  2.  Patient Outreach Method: [employer] message  3.   Medication in question: losartan   LAST FILLED: 12/19/24 for 30 day supply  Hypertension Medications              losartan (COZAAR) 25 MG tablet TAKE 1 TABLET BY MOUTH ONCE DAILY    minoxidiL (LONITEN) 2.5 MG tablet TAKE 1 TABLET BY MOUTH ONCE DAILY              4.  Reviewed and or Updates Made To: Patient Chart  5. Outreach Outcomes and/or actions taken: Sent inquiry to patient: Waiting for response.

## 2025-03-22 ENCOUNTER — OFFICE VISIT (OUTPATIENT)
Dept: URGENT CARE | Facility: CLINIC | Age: 45
End: 2025-03-22
Payer: COMMERCIAL

## 2025-03-22 VITALS
HEART RATE: 101 BPM | RESPIRATION RATE: 18 BRPM | BODY MASS INDEX: 29.37 KG/M2 | HEIGHT: 64 IN | WEIGHT: 172 LBS | TEMPERATURE: 98 F | SYSTOLIC BLOOD PRESSURE: 149 MMHG | OXYGEN SATURATION: 97 % | DIASTOLIC BLOOD PRESSURE: 100 MMHG

## 2025-03-22 DIAGNOSIS — J32.9 BACTERIAL SINUSITIS: Primary | ICD-10-CM

## 2025-03-22 DIAGNOSIS — B96.89 BACTERIAL SINUSITIS: Primary | ICD-10-CM

## 2025-03-22 PROCEDURE — 99214 OFFICE O/P EST MOD 30 MIN: CPT | Mod: S$GLB,,, | Performed by: NURSE PRACTITIONER

## 2025-03-22 RX ORDER — AMOXICILLIN AND CLAVULANATE POTASSIUM 875; 125 MG/1; MG/1
1 TABLET, FILM COATED ORAL EVERY 12 HOURS
Qty: 14 TABLET | Refills: 0 | Status: SHIPPED | OUTPATIENT
Start: 2025-03-22 | End: 2025-03-29

## 2025-03-22 RX ORDER — FLUTICASONE PROPIONATE 50 MCG
1 SPRAY, SUSPENSION (ML) NASAL DAILY
Qty: 16 G | Refills: 0 | Status: SHIPPED | OUTPATIENT
Start: 2025-03-22 | End: 2025-03-29

## 2025-03-22 NOTE — PATIENT INSTRUCTIONS
Prescriptions sent to pharmacy:  Augmentin- antibiotic- take twice a day for 7 days. Take with food. May take a probiotic to help with GI side effects.    Flonase- steroid nasal spray- spray in nostrils 1-2 times a day to help with runny nose and sinus congestion      Other recommendations:  Oral antihistamine (Claritin, Zyrtec, Allegra,or Xyzal) for post nasal drip and runny nose  Cough expectorant (Mucinex DM) to break up mucus in your chest. Take with a full glass of water        Please drink plenty of fluids.  Please get plenty of rest.  Nasal irrigation with a saline spray or Netti Pot like device per their directions is also recommended.  If you  smoke, please stop smoking.    If not allergic, take Tylenol (Acetaminophen) 650 mg to  1 g every 6 hours as needed  and/or Motrin (Ibuprofen) 600 to 800 mg every 6 hours as needed for fever or pain.      Please remember that you have received care at an urgent care today. Urgent cares are not emergency rooms and are not equipped to handle life threatening emergencies and cannot rule in or out certain medical conditions and you may be released before all of your medical problems are known or treated.     Please arrange follow up with your primary care physician or speciality clinic within 2-5 days if your signs and symptoms have not resolved or worsen.     Patient can call our Referral Hotline at [phone] to make an appointment.      Please return here or go to the Emergency Department for any concerns or worsening of condition.

## 2025-03-22 NOTE — PROGRESS NOTES
"Subjective:      Patient ID: Codi Wilkerson is a 44 y.o. female.    Vitals:  height is 5' 4" (1.626 m) and weight is 78 kg (172 lb). Her oral temperature is 97.8 °F (36.6 °C). Her blood pressure is 149/100 (abnormal) and her pulse is 101. Her respiration is 18 and oxygen saturation is 97%.     Chief Complaint: Cough    Pt states that she is having a cough / mucus, itchy eyes,eras and throat for about 1 week . Pt is taking robitussin and Allegra and motrin .     Cough  This is a new problem. The current episode started 1 to 4 weeks ago. The problem has been unchanged. The problem occurs constantly. The cough is Productive of sputum. Associated symptoms include a sore throat. Pertinent negatives include no fever. She has tried OTC cough suppressant (motrin) for the symptoms.       Constitution: Negative for fatigue and fever.   HENT:  Positive for congestion, sinus pressure and sore throat.    Eyes:  Positive for eye itching.   Respiratory:  Positive for cough and sputum production.       Objective:     Physical Exam   Constitutional: She is oriented to person, place, and time. She appears well-developed. She is cooperative.  Non-toxic appearance. She does not appear ill. No distress.   HENT:   Head: Normocephalic and atraumatic.   Ears:   Right Ear: Hearing, external ear and ear canal normal. A middle ear effusion is present.   Left Ear: Hearing, external ear and ear canal normal. A middle ear effusion is present.   Nose: Congestion present. No mucosal edema, rhinorrhea or nasal deformity. No epistaxis. Right sinus exhibits maxillary sinus tenderness and frontal sinus tenderness. Left sinus exhibits maxillary sinus tenderness and frontal sinus tenderness.   Mouth/Throat: Uvula is midline, oropharynx is clear and moist and mucous membranes are normal. No trismus in the jaw. Normal dentition. No uvula swelling. No oropharyngeal exudate, posterior oropharyngeal edema or posterior oropharyngeal erythema.   Eyes: " Conjunctivae and lids are normal. No scleral icterus.   Neck: Trachea normal and phonation normal. Neck supple. No edema present. No erythema present. No neck rigidity present.   Cardiovascular: Normal rate, regular rhythm, normal heart sounds and normal pulses.   Pulmonary/Chest: Effort normal and breath sounds normal. No stridor. No respiratory distress. She has no decreased breath sounds. She has no wheezes. She has no rhonchi. She has no rales. She exhibits no tenderness.   Abdominal: Normal appearance.   Musculoskeletal: Normal range of motion.         General: No deformity. Normal range of motion.   Lymphadenopathy:     She has no cervical adenopathy.   Neurological: She is alert and oriented to person, place, and time. She exhibits normal muscle tone. Coordination normal.   Skin: Skin is warm, dry, intact, not diaphoretic and not pale.   Psychiatric: Her speech is normal and behavior is normal. Judgment and thought content normal.   Nursing note and vitals reviewed.      Assessment:     1. Bacterial sinusitis        Plan:       Bacterial sinusitis  -     amoxicillin-clavulanate 875-125mg (AUGMENTIN) 875-125 mg per tablet; Take 1 tablet by mouth every 12 (twelve) hours. for 7 days  Dispense: 14 tablet; Refill: 0  -     fluticasone propionate (FLONASE) 50 mcg/actuation nasal spray; 1 spray (50 mcg total) by Each Nostril route once daily. for 7 days  Dispense: 16 g; Refill: 0             Patient Instructions   Prescriptions sent to pharmacy:  Augmentin- antibiotic- take twice a day for 7 days. Take with food. May take a probiotic to help with GI side effects.    Flonase- steroid nasal spray- spray in nostrils 1-2 times a day to help with runny nose and sinus congestion      Other recommendations:  Oral antihistamine (Claritin, Zyrtec, Allegra,or Xyzal) for post nasal drip and runny nose  Cough expectorant (Mucinex DM) to break up mucus in your chest. Take with a full glass of water        Please drink plenty of  fluids.  Please get plenty of rest.  Nasal irrigation with a saline spray or Netti Pot like device per their directions is also recommended.  If you  smoke, please stop smoking.    If not allergic, take Tylenol (Acetaminophen) 650 mg to  1 g every 6 hours as needed  and/or Motrin (Ibuprofen) 600 to 800 mg every 6 hours as needed for fever or pain.      Please remember that you have received care at an urgent care today. Urgent cares are not emergency rooms and are not equipped to handle life threatening emergencies and cannot rule in or out certain medical conditions and you may be released before all of your medical problems are known or treated.     Please arrange follow up with your primary care physician or speciality clinic within 2-5 days if your signs and symptoms have not resolved or worsen.     Patient can call our Referral Hotline at [phone] to make an appointment.      Please return here or go to the Emergency Department for any concerns or worsening of condition.

## 2025-03-26 ENCOUNTER — LAB VISIT (OUTPATIENT)
Dept: LAB | Facility: HOSPITAL | Age: 45
End: 2025-03-26
Attending: FAMILY MEDICINE
Payer: COMMERCIAL

## 2025-03-26 ENCOUNTER — OFFICE VISIT (OUTPATIENT)
Dept: FAMILY MEDICINE | Facility: CLINIC | Age: 45
End: 2025-03-26
Payer: COMMERCIAL

## 2025-03-26 VITALS
HEART RATE: 90 BPM | DIASTOLIC BLOOD PRESSURE: 90 MMHG | BODY MASS INDEX: 29.96 KG/M2 | OXYGEN SATURATION: 97 % | WEIGHT: 175.5 LBS | HEIGHT: 64 IN | TEMPERATURE: 98 F | SYSTOLIC BLOOD PRESSURE: 140 MMHG

## 2025-03-26 DIAGNOSIS — M79.671 RIGHT FOOT PAIN: ICD-10-CM

## 2025-03-26 DIAGNOSIS — F31.9 BIPOLAR AFFECTIVE DISORDER, REMISSION STATUS UNSPECIFIED: ICD-10-CM

## 2025-03-26 DIAGNOSIS — E11.9 TYPE 2 DIABETES MELLITUS WITHOUT COMPLICATION, WITHOUT LONG-TERM CURRENT USE OF INSULIN: ICD-10-CM

## 2025-03-26 DIAGNOSIS — M79.671 RIGHT FOOT PAIN: Primary | ICD-10-CM

## 2025-03-26 LAB
ALBUMIN SERPL BCP-MCNC: 4.1 G/DL (ref 3.5–5.2)
ALP SERPL-CCNC: 89 UNIT/L (ref 40–150)
ALT SERPL W/O P-5'-P-CCNC: 41 UNIT/L (ref 10–44)
ANION GAP (OHS): 14 MMOL/L (ref 8–16)
AST SERPL-CCNC: 32 UNIT/L (ref 11–45)
BILIRUB SERPL-MCNC: 0.2 MG/DL (ref 0.1–1)
BUN SERPL-MCNC: 8 MG/DL (ref 6–20)
CALCIUM SERPL-MCNC: 9.6 MG/DL (ref 8.7–10.5)
CHLORIDE SERPL-SCNC: 103 MMOL/L (ref 95–110)
CO2 SERPL-SCNC: 22 MMOL/L (ref 23–29)
CREAT SERPL-MCNC: 0.8 MG/DL (ref 0.5–1.4)
GFR SERPLBLD CREATININE-BSD FMLA CKD-EPI: >60 ML/MIN/1.73/M2
GLUCOSE SERPL-MCNC: 253 MG/DL (ref 70–110)
POTASSIUM SERPL-SCNC: 3.7 MMOL/L (ref 3.5–5.1)
PROT SERPL-MCNC: 7.4 GM/DL (ref 6–8.4)
SODIUM SERPL-SCNC: 139 MMOL/L (ref 136–145)
URATE SERPL-MCNC: 3.7 MG/DL (ref 2.4–5.7)

## 2025-03-26 PROCEDURE — 3077F SYST BP >= 140 MM HG: CPT | Mod: CPTII,S$GLB,,

## 2025-03-26 PROCEDURE — 96372 THER/PROPH/DIAG INJ SC/IM: CPT | Mod: S$GLB,,,

## 2025-03-26 PROCEDURE — 3008F BODY MASS INDEX DOCD: CPT | Mod: CPTII,S$GLB,,

## 2025-03-26 PROCEDURE — 3080F DIAST BP >= 90 MM HG: CPT | Mod: CPTII,S$GLB,,

## 2025-03-26 PROCEDURE — 36415 COLL VENOUS BLD VENIPUNCTURE: CPT | Mod: PO

## 2025-03-26 PROCEDURE — 84550 ASSAY OF BLOOD/URIC ACID: CPT

## 2025-03-26 PROCEDURE — 99999 PR PBB SHADOW E&M-EST. PATIENT-LVL V: CPT | Mod: PBBFAC,,,

## 2025-03-26 PROCEDURE — 3051F HG A1C>EQUAL 7.0%<8.0%: CPT | Mod: CPTII,S$GLB,,

## 2025-03-26 PROCEDURE — 83036 HEMOGLOBIN GLYCOSYLATED A1C: CPT

## 2025-03-26 PROCEDURE — 80053 COMPREHEN METABOLIC PANEL: CPT

## 2025-03-26 PROCEDURE — 99214 OFFICE O/P EST MOD 30 MIN: CPT | Mod: 25,S$GLB,,

## 2025-03-26 PROCEDURE — 4010F ACE/ARB THERAPY RXD/TAKEN: CPT | Mod: CPTII,S$GLB,,

## 2025-03-26 PROCEDURE — 1159F MED LIST DOCD IN RCRD: CPT | Mod: CPTII,S$GLB,,

## 2025-03-26 RX ORDER — IBUPROFEN 800 MG/1
800 TABLET ORAL 3 TIMES DAILY
Qty: 30 TABLET | Refills: 0 | Status: SHIPPED | OUTPATIENT
Start: 2025-03-26

## 2025-03-26 RX ORDER — KETOROLAC TROMETHAMINE 30 MG/ML
30 INJECTION, SOLUTION INTRAMUSCULAR; INTRAVENOUS
Status: COMPLETED | OUTPATIENT
Start: 2025-03-26 | End: 2025-03-26

## 2025-03-26 RX ADMIN — KETOROLAC TROMETHAMINE 30 MG: 30 INJECTION, SOLUTION INTRAMUSCULAR; INTRAVENOUS at 03:03

## 2025-03-26 NOTE — PROGRESS NOTES
Patient tolerated  ketorolac 30 mg injection well, instructed to remain in clinic for 15mins.to monitor for allergic reaction. Verbalized understanding.

## 2025-03-26 NOTE — LETTER
March 27, 2025      [facility]  [address] [facility]  [place] [address]  Phone: [phone]  Fax: [phone]       Patient: Codi Wilkerson   YOB: 1980  Date of Visit: 3/26/2025    To Whom It May Concern:    Elke Wilkerson  was at [facility] on 3/26/2025. The patient may return to work/school on 3/31/25 with no restrictions. If you have any questions or concerns, or if I can be of further assistance, please do not hesitate to contact me.    Sincerely,    Gail Sunshine NP

## 2025-03-26 NOTE — PROGRESS NOTES
"Subjective       Patient ID: Codi Wilkerson is a 44 y.o. female who presents for Foot Pain.    HPI               Objective     BP (!) 140/90 (BP Location: Right arm, Patient Position: Sitting)   Pulse 90   Temp 98.2 °F (36.8 °C) (Oral)   Ht 5' 4" (1.626 m)   Wt 79.6 kg (175 lb 7.8 oz)   LMP 02/12/2009   SpO2 97%   BMI 30.12 kg/m²      Physical Exam      Assessment & Plan                 "

## 2025-03-27 ENCOUNTER — NURSE TRIAGE (OUTPATIENT)
Dept: ADMINISTRATIVE | Facility: CLINIC | Age: 45
End: 2025-03-27
Payer: COMMERCIAL

## 2025-03-27 ENCOUNTER — TELEPHONE (OUTPATIENT)
Dept: FAMILY MEDICINE | Facility: CLINIC | Age: 45
End: 2025-03-27
Payer: COMMERCIAL

## 2025-03-27 ENCOUNTER — OCHSNER VIRTUAL EMERGENCY DEPARTMENT (OUTPATIENT)
Facility: CLINIC | Age: 45
End: 2025-03-27
Payer: COMMERCIAL

## 2025-03-27 ENCOUNTER — HOSPITAL ENCOUNTER (OUTPATIENT)
Dept: RADIOLOGY | Facility: HOSPITAL | Age: 45
Discharge: HOME | End: 2025-03-27
Payer: COMMERCIAL

## 2025-03-27 DIAGNOSIS — M25.579 ANKLE PAIN, UNSPECIFIED CHRONICITY, UNSPECIFIED LATERALITY: Primary | ICD-10-CM

## 2025-03-27 DIAGNOSIS — M79.671 RIGHT FOOT PAIN: ICD-10-CM

## 2025-03-27 DIAGNOSIS — M79.671 RIGHT FOOT PAIN: Primary | ICD-10-CM

## 2025-03-27 LAB
EAG (OHS): 171 MG/DL (ref 68–131)
HBA1C MFR BLD: 7.6 % (ref 4–5.6)

## 2025-03-27 PROCEDURE — 73630 X-RAY EXAM OF FOOT: CPT | Mod: 26,RT,, | Performed by: RADIOLOGY

## 2025-03-27 PROCEDURE — 73630 X-RAY EXAM OF FOOT: CPT | Mod: TC,FY,PO,RT

## 2025-03-27 PROCEDURE — 73610 X-RAY EXAM OF ANKLE: CPT | Mod: TC,FY,PO,RT

## 2025-03-27 PROCEDURE — 73610 X-RAY EXAM OF ANKLE: CPT | Mod: 26,RT,, | Performed by: RADIOLOGY

## 2025-03-27 RX ORDER — HYDROCODONE BITARTRATE AND ACETAMINOPHEN 5; 325 MG/1; MG/1
1 TABLET ORAL EVERY 6 HOURS PRN
Qty: 12 TABLET | Refills: 0 | Status: SHIPPED | OUTPATIENT
Start: 2025-03-27

## 2025-03-27 NOTE — TELEPHONE ENCOUNTER
[place]    PCP:  Ivory Lacy MD    Pt was seen yesterday.  She was given a Toradol injection.  She said the injection worked for a couple of hours only.  She reports had some testing done yesterday and xrays today.  She states the Ibuprofen she was prescribed isn't touching the pain either.  C/O Rt ankle pain rated 10++++/10 that radiates up the mid RLE, Rt ankle redness/swelling, and unable to walk.  Denies fever, injury, CP, difficulty breathing, and calf pain.  Per protocol, care advised is go to ED/UCC now (or to office with PCP approval).  Pt referred to [facility] Provider (Jose De Jesus Chavez II, MD).  [facility] Provider recommends:  appt with PCP.  Appt scheduled within timeframe of dispo.  Pt VU.  Advised to call for worsening/questions/concerns.  VU.    Reason for Disposition   Patient sounds very sick or weak to the triager    Additional Information   Negative: Difficulty breathing   Negative: Entire foot is cool or blue in comparison to other side   Negative: Ankle pain and fever   Negative: Ankle redness and fever    Protocols used: Ankle Swelling-A-[place]

## 2025-03-27 NOTE — PLAN OF CARE-OVED
[facility] Emergency Department Plan of Care Note  Referral Source: Nurse On-Call                               Chief Complaint   Patient presents with    Ankle Pain     MDM - contacted RN on call b/c of continued ankle pain, redness, swelling and inability to walk despite nsaids.  Had labs yest, xr today.  Pcp appt avail at 2pm, scheduled.    Recommendation: Primary Care                            Encounter Diagnosis   Name Primary?    Ankle pain, unspecified chronicity, unspecified laterality Yes

## 2025-03-27 NOTE — PROGRESS NOTES
"Patient called the [facility] RN on 3/27/25 for c/o "Rt ankle pain rated 10++++/10 that radiates up the mid RLE, Rt ankle redness/swelling, and unable to walk". [facility] RN consult [facility].     [facility] Provider Dr Chavez disposition recommendation patient to see primary care.    Appointment scheduled for 3/27/2025 at 2:00 PM with Guerline Hamilton PA-C at [address] [place] [address],    Follow up scheduled for 3/28/25 to assess for additional needs.      "

## 2025-03-27 NOTE — TELEPHONE ENCOUNTER
----- Message from Med Assistant Lorenzo sent at 3/27/2025 11:28 AM CDT -----  Type:  Needs Medical Advice/Symptom-based CallWho Called: SelfSymptoms (please be specific): pt. Was seen on yesterday for pain in her right ankle  yesterday and states today she is unable to walk on it with out excruciating pain .. She states Ibuprofen alone isn't managing her pain .. How long has patient had these symptoms:  excruciating pain causing her difficulty to walk Would the patient rather a call back or a response via [facility]? Yes, call Best Call Back Number:  [phone] (home)

## 2025-03-27 NOTE — PROGRESS NOTES
HPI     Chief Complaint:  Chief Complaint   Patient presents with    Foot Pain       Codi Wilkerson is a 44 y.o. female with multiple medical diagnoses as listed in the medical history and problem list that presents for   Chief Complaint   Patient presents with    Foot Pain    .     Patient is not known to me with her last appointment in this department on 1/28/2025.     Pt presents for foot pain.  Foot Pain  This is a new problem. The current episode started in the past 7 days. The problem occurs constantly. The problem has been gradually worsening. Associated symptoms include arthralgias, joint swelling and myalgias. The symptoms are aggravated by walking and standing. She has tried NSAIDs and immobilization for the symptoms. The treatment provided mild relief.         Assessment & Plan     Problem List Items Addressed This Visit       Type 2 diabetes mellitus without complication, without long-term current use of insulin  Stable. The current medical regimen is effective;  continue present plan and medications.    Hemoglobin A1C   Date Value Ref Range Status   09/28/2024 5.9 (H) 4.0 - 5.6 % Final     Comment:     ADA Screening Guidelines:  5.7-6.4%  Consistent with prediabetes  >or=6.5%  Consistent with diabetes    High levels of fetal hemoglobin interfere with the HbA1C  assay. Heterozygous hemoglobin variants (HbS, HgC, etc)do  not significantly interfere with this assay.   However, presence of multiple variants may affect accuracy.     11/01/2023 6.0 (H) 4.0 - 5.6 % Final     Comment:     ADA Screening Guidelines:  5.7-6.4%  Consistent with prediabetes  >or=6.5%  Consistent with diabetes    High levels of fetal hemoglobin interfere with the HbA1C  assay. Heterozygous hemoglobin variants (HbS, HgC, etc)do  not significantly interfere with this assay.   However, presence of multiple variants may affect accuracy.     05/02/2023 6.0 (H) 4.0 - 5.6 % Final     Comment:     ADA Screening Guidelines:  5.7-6.4%   Consistent with prediabetes  >or=6.5%  Consistent with diabetes    High levels of fetal hemoglobin interfere with the HbA1C  assay. Heterozygous hemoglobin variants (HbS, HgC, etc)do  not significantly interfere with this assay.   However, presence of multiple variants may affect accuracy.       Hemoglobin A1c   Date Value Ref Range Status   03/26/2025 7.6 (H) 4.0 - 5.6 % Final     Comment:     ADA Screening Guidelines:  5.7-6.4%  Consistent with prediabetes  >=6.5%  Consistent with diabetes    High levels of fetal hemoglobin interfere with the HbA1C  assay. Heterozygous hemoglobin variants (HbS, HgC, etc)do  not significantly interfere with this assay.   However, presence of multiple variants may affect accuracy.         Overview   type 2         Relevant Orders    HEMOGLOBIN A1C (Completed)    Bipolar disorder  Stable mood. The current medical regimen is effective;  continue present plan and medications.       Other Visit Diagnoses         Right foot pain    -  Primary  Right foot pain for the past several days. Has tried ibuprofen which helped some.  Will order toradol injection and ibu 800.  Will order foot and ankle x-ray. Will order CMP and uric acid.    Relevant Medications    ketorolac injection 30 mg (Completed)    ibuprofen (ADVIL,MOTRIN) 800 MG tablet    Other Relevant Orders    X-Ray Foot Complete Right    X-Ray Ankle Complete Right    Uric Acid (Completed)    COMPREHENSIVE METABOLIC PANEL (Completed)              --------------------------------------------      Health Maintenance:  Health Maintenance         Date Due Completion Date    Influenza Vaccine (1) 09/01/2024 10/2/2023    Override on 10/5/2020: Done (oct 2020 external)    Override on 10/1/2019: Done    COVID-19 Vaccine (4 - 2024-25 season) 09/01/2024 10/22/2021    Diabetic Eye Exam 01/19/2025 1/19/2024    Override on 4/12/2021: Done (Dr. Crump  - april 2021)    Hemoglobin A1c 09/26/2025 3/26/2025    Diabetes Urine Screening 09/28/2025  "2024    Lipid Panel 2025    Mammogram 2025    Foot Exam 2026    Override on 2023: Done    Override on 2/3/2020: Done    Low Dose Statin 2026 3/26/2025    TETANUS VACCINE 10/02/2033 10/2/2023    RSV Vaccine (Age 60+ and Pregnant patients) (1 - 1-dose 75+ series) 2056 ---            Health maintenance reviewed and A1c ordered    Follow Up:  No follow-ups on file.    Exam     Review of Systems:  (as noted above)  Review of Systems   Musculoskeletal:  Positive for arthralgias, joint swelling and myalgias.       Physical Exam:   Physical Exam  Constitutional:       General: She is not in acute distress.     Appearance: Normal appearance. She is obese. She is not ill-appearing, toxic-appearing or diaphoretic.   Cardiovascular:      Rate and Rhythm: Normal rate.   Pulmonary:      Effort: Pulmonary effort is normal.   Musculoskeletal:      Right foot: Decreased range of motion. Swelling, deformity, tenderness and bony tenderness present.      Comments: Right foot deformity turned out laterally   Neurological:      Mental Status: She is alert.       Vitals:    25 1501   BP: (!) 140/90   BP Location: Right arm   Patient Position: Sitting   Pulse: 90   Temp: 98.2 °F (36.8 °C)   TempSrc: Oral   SpO2: 97%   Weight: 79.6 kg (175 lb 7.8 oz)   Height: 5' 4" (1.626 m)      Body mass index is 30.12 kg/m².        History     Past Medical History:  Past Medical History:   Diagnosis Date    Anxiety and depression     psychiatrist Dr. Casanova    Bilateral ovarian cysts 2017    Diabetes mellitus 2014    type 2    Elevated CEA 2017    Female pelvic pain 2017    GERD (gastroesophageal reflux disease)     HLD (hyperlipidemia)     Lesion of right lung 2017    Migraine headache     Right ovarian cyst 2017       Past Surgical History:  Past Surgical History:   Procedure Laterality Date     SECTION, LOW TRANSVERSE  ,     " COLONOSCOPY N/A 6/12/2017    Procedure: COLONOSCOPY;  Surgeon: Phill Valencia MD;  [facility]  Service: Endoscopy;  Laterality: N/A; unremarkable, Repeat colonoscopy at regular screening age for screening purposes    COLONOSCOPY N/A 12/20/2021    Procedure: COLONOSCOPY;  Surgeon: Kevin Conner MD;  [facility];  Service: Endoscopy;  Laterality: N/A;    ECTOPIC PREGNANCY SURGERY  2003    laparotomy     ESOPHAGOGASTRODUODENOSCOPY N/A 12/14/2022    Procedure: ESOPHAGOGASTRODUODENOSCOPY (EGD);  Surgeon: Maycol Mooney MD;  Location: [facility];  Service: Endoscopy;  Laterality: N/A;    HYSTERECTOMY  2009    supracervical LH.     PARTIAL HYSTERECTOMY  3 years ago    SALPINGECTOMY Right 2003       Social History:  Social History[1]    Family History:  Family History   Problem Relation Name Age of Onset    Depression Mother Abbey     Migraines Mother Abbey     Thyroid nodules Mother Abbey         benign    Hypertension Father João     Hyperlipidemia Father João     Hypertension Brother Damian     Heart disease Maternal Grandmother      Heart disease Maternal Grandfather      Hypertension Maternal Grandfather      Colon cancer Neg Hx      Esophageal cancer Neg Hx      Crohn's disease Neg Hx      Irritable bowel syndrome Neg Hx      Stomach cancer Neg Hx      Rectal cancer Neg Hx      Ulcerative colitis Neg Hx      Celiac disease Neg Hx         Allergies and Medications: (updated and reviewed)  Review of patient's allergies indicates:   Allergen Reactions    Indomethacin Rash    Morphine Itching     Current Medications[2]    Patient Care Team:  Ivory Lacy MD as PCP - General (Family Medicine)  Mely Escoto NP as Nurse Practitioner (Gastroenterology)  Jonah Acharya OD as Consulting Physician (Ophthalmology)         - The patient is given an After Visit Summary that lists all medications with directions, allergies, education, orders placed during this encounter and follow-up  instructions.      - I have reviewed the patient's medical information including past medical, family, and social history sections including the medications and allergies.      - We discussed the patient's current medications.     This note was created by combination of typed  and MModal dictation.  Transcription errors may be present.  If there are any questions, please contact me.       Gail Sunshine NP                    [1]   Social History  Socioeconomic History    Marital status:    Tobacco Use    Smoking status: Every Day     Current packs/day: 1.00     Average packs/day: 1 pack/day for 2.2 years (2.2 ttl pk-yrs)     Types: Cigarettes     Start date: 1/1/2023    Smokeless tobacco: Never   Substance and Sexual Activity    Alcohol use: Yes     Alcohol/week: 2.5 standard drinks of alcohol     Types: 3 Standard drinks or equivalent per week     Comment: Just during special occasions    Drug use: No    Sexual activity: Yes     Partners: Male     Birth control/protection: None     Social Drivers of Health     Financial Resource Strain: Patient Declined (5/6/2024)    Overall Financial Resource Strain (CARDIA)     Difficulty of Paying Living Expenses: Patient declined   Food Insecurity: Patient Declined (5/6/2024)    Hunger Vital Sign     Worried About Running Out of Food in the Last Year: Patient declined     Ran Out of Food in the Last Year: Patient declined   Transportation Needs: Patient Declined (5/6/2024)    PRAPARE - Transportation     Lack of Transportation (Medical): Patient declined     Lack of Transportation (Non-Medical): Patient declined   Physical Activity: Unknown (5/6/2024)    Exercise Vital Sign     Days of Exercise per Week: Patient declined   Stress: Patient Declined (5/6/2024)    [employer] - Occupational Stress Questionnaire     Feeling of Stress : Patient declined   Housing Stability: Unknown (5/6/2024)    Housing Stability Vital Sign     Unable  to Pay for Housing in the Last Year: Patient declined   [2]   Current Outpatient Medications   Medication Sig Dispense Refill    amoxicillin-clavulanate 875-125mg (AUGMENTIN) 875-125 mg per tablet Take 1 tablet by mouth every 12 (twelve) hours. for 7 days 14 tablet 0    atorvastatin (LIPITOR) 20 MG tablet Take 1 tablet (20 mg total) by mouth once daily. 90 tablet 1    blood sugar diagnostic Strp To check BG one times daily, to use with insurance preferred meter 200 strip 3    clonazePAM (KLONOPIN) 0.5 MG tablet       ergocalciferol (ERGOCALCIFEROL) 50,000 unit Cap TAKE 1 CAPSULE BY MOUTH ONCE WEEKLY 12 capsule 3    EScitalopram oxalate (LEXAPRO) 10 MG tablet Take 10 mg by mouth.      fenofibrate micronized (LOFIBRA) 134 MG Cap TAKE 1 CAPSULE BY MOUTH ONCE DAILY 90 capsule 1    fluconazole (DIFLUCAN) 100 MG tablet Take 100 mg by mouth.      fluticasone propionate (FLONASE) 50 mcg/actuation nasal spray 1 spray (50 mcg total) by Each Nostril route once daily. for 7 days 16 g 0    lamoTRIgine (LAMICTAL) 25 MG tablet Take 1 tablet (25 mg total) by mouth 2 (two) times daily. 14 tablet 0    lancets Misc To check BG one times daily, to use with insurance preferred meter 200 each 3    losartan (COZAAR) 25 MG tablet TAKE 1 TABLET BY MOUTH ONCE DAILY 90 tablet 1    minoxidiL (LONITEN) 2.5 MG tablet TAKE 1 TABLET BY MOUTH ONCE DAILY 30 tablet 3    ondansetron (ZOFRAN-ODT) 4 MG TbDL Take 1 tablet (4 mg total) by mouth every 8 (eight) hours as needed (nausea). 20 tablet 0    ondansetron (ZOFRAN-ODT) 4 MG TbDL Take 1 tablet (4 mg total) by mouth every 6 (six) hours as needed. 10 tablet 0    pantoprazole (PROTONIX) 40 MG tablet TAKE 1 TABLET BY MOUTH TWICE DAILY 180 tablet 3    terconazole (TERAZOL 7) 0.4 % Crea 2 (two) times daily.      tirzepatide 10 mg/0.5 mL PnIj Inject 10 mg into the skin every 7 days. 4 Pen 5    blood-glucose meter kit To check BG one times daily, to use with insurance preferred meter 1 each 0    ibuprofen  (ADVIL,MOTRIN) 800 MG tablet Take 1 tablet (800 mg total) by mouth 3 (three) times daily. 30 tablet 0     No current facility-administered medications for this visit.

## 2025-03-27 NOTE — TELEPHONE ENCOUNTER
Spoke with patient states her ankle is hurting really bad, states unable to walk or drive, spoke with Gail Sunshine NP states will send some medication in for patient.

## 2025-03-28 ENCOUNTER — OFFICE VISIT (OUTPATIENT)
Dept: ORTHOPEDICS | Facility: CLINIC | Age: 45
End: 2025-03-28
Payer: COMMERCIAL

## 2025-03-28 ENCOUNTER — RESULTS FOLLOW-UP (OUTPATIENT)
Dept: FAMILY MEDICINE | Facility: CLINIC | Age: 45
End: 2025-03-28
Payer: COMMERCIAL

## 2025-03-28 ENCOUNTER — PATIENT OUTREACH (OUTPATIENT)
Facility: OTHER | Age: 45
End: 2025-03-28
Payer: COMMERCIAL

## 2025-03-28 DIAGNOSIS — E11.9 TYPE 2 DIABETES MELLITUS WITHOUT COMPLICATION, WITHOUT LONG-TERM CURRENT USE OF INSULIN: Primary | ICD-10-CM

## 2025-03-28 DIAGNOSIS — M79.671 FOOT PAIN, RIGHT: Primary | ICD-10-CM

## 2025-03-28 PROCEDURE — 99999 PR PBB SHADOW E&M-EST. PATIENT-LVL II: CPT | Mod: PBBFAC,,,

## 2025-03-28 PROCEDURE — 99999 PR PBB SHADOW E&M-EST. PATIENT-LVL III: CPT | Mod: PBBFAC,,, | Performed by: SURGERY

## 2025-03-28 NOTE — PROGRESS NOTES
SUBJECTIVE:    Ms. Wilkerson is here today for a follow up visit.  She was seen yesterday in the emergency room for pain of the ankle joint in foot.  She is exquisitely tender about the ankle joint.  They considered whether could be gout however they thought that there was no evidence of this given the normal uric acid.        OBJECTIVE:      Vitals:    03/28/25 1002   PainSc: 10-Worst pain ever   PainLoc: Ankle       Lower Extremity Exam  Exquisitely tender to palpation about the ankle and foot.  No swelling, minimal deformity, tenderness to palpation throughout the entirety of the foot and ankle.  Tenderness with range of motion neurovascularly grossly intact.     DIAGNOSTIC STUDIES:  X-rays of the foot and ankle demonstrates some foot arthritis about the talonavicular joint, ankle appears relatively preserved.  No evidence of fractures on x-ray.    ASSESSMENT:   1. Foot and ankle pain      PLAN:  Codi was seen today for pain.    Diagnoses and all orders for this visit:    Foot pain, right  -     CT Foot Without Contrast Right; Future        We will refer her for a CT scan.  We will fit her with a ASO brace.  We discussed potential medications for this problem.  We will see her back after the CT scan for further management.  All questions answered.  Hep 37234:  Foot and ankle exercise sheet for pain, range of motion, and other modalities given.  Demonstrated to patient by provider for greater than 15 minutes.    Alex Barnes MD  [facility]  Orthopedic Surgery      This note was done with voice recognition software. Please excuse any errors missed in proof reading.

## 2025-03-28 NOTE — PROGRESS NOTES
Per chart review, patient did not attend primary care appointment on 3/27/25. Patient saw Ortho today and a CT was ordered. Called patient to follow up and assess additional needs. Patient states she scheduled an appointment for Monday with a non-[facility] orthopedic doctor because she didn't feel like her concerns were addressed at the ortho appointment on today. Offered number for patient advocacy, patient declined.   Follow up call scheduled for 4/1/25.

## 2025-04-02 DIAGNOSIS — E11.9 TYPE 2 DIABETES MELLITUS WITHOUT COMPLICATION, WITHOUT LONG-TERM CURRENT USE OF INSULIN: ICD-10-CM

## 2025-04-02 NOTE — TELEPHONE ENCOUNTER
Care Due:                  Date            Visit Type   Department     Provider  --------------------------------------------------------------------------------                                 -         [facility]                              PRIMARY      / INTERNAL MED  Last Visit: 09-      CARE ([facility])   / JOSEMANUEL Newell                              [facility]                              PRIMARY      / INTERNAL MED  Next Visit: 05-      CARE ([facility])   / JOSEMANUEL Lacy                                                            Last  Test          Frequency    Reason                     Performed    Due Date  --------------------------------------------------------------------------------    Vitamin D...  12 months..  ergocalciferol...........  Not Found    Overdue    [employer] Embedded Care Due Messages. Reference number: [id number].   4/02/2025 10:02:22 AM CDT

## 2025-04-03 DIAGNOSIS — E11.9 TYPE 2 DIABETES MELLITUS WITHOUT COMPLICATION, WITHOUT LONG-TERM CURRENT USE OF INSULIN: ICD-10-CM

## 2025-04-03 NOTE — TELEPHONE ENCOUNTER
No care due was identified.  [facility] Embedded Care Due Messages. Reference number: [id number].   4/03/2025 2:24:20 PM CDT

## 2025-04-03 NOTE — TELEPHONE ENCOUNTER
Copied from CRM #[id number]. Topic: Medications - Medication Status Check   >> Apr 3, 2025 10:15 AM Belinda wrote:  .Type: Patient Call Back    Who called: Self     What is the request in detail: asked for a call back to discuss her medlicine They sent Monjurao but the wrong dosage     Can the clinic reply by [facility]? No     Would the patient rather a call back or a response via [facility]? Call     Best call back number:.[phone]      Additional Information:

## 2025-04-04 ENCOUNTER — TELEPHONE (OUTPATIENT)
Dept: FAMILY MEDICINE | Facility: CLINIC | Age: 45
End: 2025-04-04
Payer: COMMERCIAL

## 2025-04-04 DIAGNOSIS — M79.671 RIGHT FOOT PAIN: ICD-10-CM

## 2025-04-04 DIAGNOSIS — M79.671 RIGHT FOOT PAIN: Primary | ICD-10-CM

## 2025-04-04 RX ORDER — KETOROLAC TROMETHAMINE 10 MG/1
10 TABLET, FILM COATED ORAL EVERY 6 HOURS
Qty: 20 TABLET | Refills: 0 | Status: SHIPPED | OUTPATIENT
Start: 2025-04-04 | End: 2025-04-04 | Stop reason: SDUPTHER

## 2025-04-04 RX ORDER — KETOROLAC TROMETHAMINE 10 MG/1
10 TABLET, FILM COATED ORAL EVERY 6 HOURS
Qty: 20 TABLET | Refills: 0 | Status: SHIPPED | OUTPATIENT
Start: 2025-04-04 | End: 2025-04-09

## 2025-04-04 NOTE — TELEPHONE ENCOUNTER
----- Message from [employer] sent at 4/4/2025 12:17 PM CDT -----  Name of Who is Calling: SCOTT BENITEZ [[id number]]What is the request in detail: Pt is requesting a call back regarding a medication she need filled. Pt advised she need to speak with someone in office regarding the medication. Please assist. Can the clinic reply by [facility]: NoWhat Number to Call Back if not in [facility]:  [phone]

## 2025-04-04 NOTE — TELEPHONE ENCOUNTER
----- Message from [place] sent at 4/4/2025  3:28 PM CDT -----  Regarding: Self   Type: RX Refill RequestWho Called: self Have you contacted your pharmacy: yes RX was sent to wrong pharmacy RefillRX Name and Strength:ketorolac (TORADOL) 10 mg tablet Preferred Pharmacy with phone number: ...[id number] - [place], [place] - [address] AT [facility] [id number] [place] [address]Vuaxk: [phone] Fax: [phone] or Mail Order: local Would the patient rather a call back or a response via [facility]? Call back Best Call Back Number:.[phone] Information:  pt needs it today she is in pain Thank you.

## 2025-04-04 NOTE — PROGRESS NOTES
Toradol sent in for foot pain, patient has used successfully in the past. Encouraged to seek urgent care for worsening pain.

## 2025-04-07 ENCOUNTER — PATIENT OUTREACH (OUTPATIENT)
Facility: OTHER | Age: 45
End: 2025-04-07
Payer: COMMERCIAL

## 2025-04-13 ENCOUNTER — HOSPITAL ENCOUNTER (OUTPATIENT)
Dept: RADIOLOGY | Facility: HOSPITAL | Age: 45
Discharge: HOME | End: 2025-04-13
Attending: ORTHOPAEDIC SURGERY
Payer: COMMERCIAL

## 2025-04-13 DIAGNOSIS — M19.071 ARTHRITIS OF ANKLE, RIGHT: ICD-10-CM

## 2025-04-13 PROCEDURE — 73721 MRI JNT OF LWR EXTRE W/O DYE: CPT | Mod: TC,RT

## 2025-04-13 PROCEDURE — 73721 MRI JNT OF LWR EXTRE W/O DYE: CPT | Mod: 26,RT,, | Performed by: RADIOLOGY

## 2025-04-22 ENCOUNTER — TELEPHONE (OUTPATIENT)
Dept: PHARMACY | Facility: CLINIC | Age: 45
End: 2025-04-22
Payer: COMMERCIAL

## 2025-04-22 NOTE — TELEPHONE ENCOUNTER
[facility]Population [facility] Chart Review & Patient Outreach Details For Medication Adherence Project    Reason for Outreach Encounter: 3rd Party payor non-compliance report ([employer], [employer], C, etc)  2.  Patient Outreach Method: Reviewed Patient Chart  3.   Medication in question: losartan   LAST FILLED: 3/26/25 for 30 day supply  Hypertension Medications              losartan (COZAAR) 25 MG tablet TAKE 1 TABLET BY MOUTH ONCE DAILY    minoxidiL (LONITEN) 2.5 MG tablet TAKE 1 TABLET BY MOUTH ONCE DAILY              4.  Reviewed and or Updates Made To: Patient Chart  5. Outreach Outcomes and/or actions taken: Patient filled medication and is on track to be adherent

## 2025-04-25 ENCOUNTER — TELEPHONE (OUTPATIENT)
Dept: FAMILY MEDICINE | Facility: CLINIC | Age: 45
End: 2025-04-25
Payer: COMMERCIAL

## 2025-04-30 DIAGNOSIS — E11.9 TYPE 2 DIABETES MELLITUS WITHOUT COMPLICATION, UNSPECIFIED WHETHER LONG TERM INSULIN USE: ICD-10-CM

## 2025-05-05 ENCOUNTER — TELEPHONE (OUTPATIENT)
Dept: PHARMACY | Facility: CLINIC | Age: 45
End: 2025-05-05
Payer: COMMERCIAL

## 2025-05-05 ENCOUNTER — PATIENT MESSAGE (OUTPATIENT)
Dept: ADMINISTRATIVE | Facility: HOSPITAL | Age: 45
End: 2025-05-05
Payer: COMMERCIAL

## 2025-05-05 NOTE — TELEPHONE ENCOUNTER
12/6/2023      RE: Teresa Rdud  [address]  [facility] [address]     Dear Colleague,    Thank you for the opportunity to participate in the care of your patient, Teresa Rudd, at the [facility] at [facility]. Please see a copy of my visit note below.    Pediatric Endocrinology Follow-up Consultation    Patient: Teresa Rudd MRN# [id number]   YOB: 2008 Age: 15year 8month old   Date of Visit: Dec 6, 2023    Dear Dr. Rivers:    I had the pleasure of seeing your patient, Teresa Rudd in the Pediatric Endocrinology Clinic, [facility], on Dec 6, 2023 for a follow-up consultation of growth deceleration, poor weight gain.           Problem list:     Patient Active Problem List    Diagnosis Date Noted    Poor weight gain in child 03/22/2023     Priority: Medium    Growth deceleration 03/22/2023     Priority: Medium    Generalized abdominal pain 03/22/2023     Priority: Medium    Nausea 03/22/2023     Priority: Medium    Herpes zoster 06/30/2017     Priority: Medium     6/28/17 Left lower back-flank-LLQ abdomen rash.   Positive DNA detection.       History of abdominal pain 08/25/2016     Priority: Medium     8/25/2016 History of previous abdominal pain and emesis:  He did have oscopys done 8/2015.  One bx showed H. Pylori type organisms.  However, he has only had one episode in last 5 months of any abdominal pain.  No need to rx at this point.          Behind on immunizations 05/08/2015     Priority: Medium     5/8/2015 Return to clinic after 11/8/2015 for Hep A, Hep B, IPV, and Dtap      Underweight 12/27/2014     Priority: Medium    Born in [place] 3[place]2013. 12/11/2014     Priority: Medium     1/2/2018 sent quantiferon.  Negative.               HPI:   Teresa Rudd is a now 15year 8month old male who was initially seen by  [facility]/Population Health Chart Review & Patient Outreach Details For Medication Adherence Project    Reason for Outreach Encounter: 3rd Party payor non-compliance report ([employer], [employer], C, etc)  2.  Patient Outreach Method: Reviewed Patient Chart  3.   Medication in question: atorvastatin   LAST FILLED: 4/23/25 for 30 day supply  Hyperlipidemia Medications              atorvastatin (LIPITOR) 20 MG tablet Take 1 tablet (20 mg total) by mouth once daily.    fenofibrate micronized (LOFIBRA) 134 MG Cap TAKE 1 CAPSULE BY MOUTH ONCE DAILY               4.  Reviewed and or Updates Made To: Patient Chart  5. Outreach Outcomes and/or actions taken: Patient filled medication and is on track to be adherent       me on 6/21/2023 at the age of 15 years 3 months. He has a history of chronic abdominal pain with multiple evaluations by gastroenterology and poor weight gain who is seen today in our pediatric endocrinology clinic for evaluation of growth deceleration.  Mom reports Teresa has grown poorly since age 6-8 years of age. Prior workup was very reassuring again endocrine causes of poor growth although growth factors were not obtained. Workup helped to exclude thyroid hormone deficiency, chronic disease, and celiac. He has never been on stimulants. He does not have any physical stigmata to suggest a genetic cause of short stature. Although growth factors were not checked, he has had recent growth acceleration even in the last 3 months as he has gained weight, which would not be seen if he had growth hormone deficiency.     Teresa had previously been following with gastroenterology (Dr. Gonzales). Review of chart shows that GI had recommended starting cyprohepatidine, however, family has never received a phone call about starting this. Review of other labs shows that H pylori testing was negative (previously positive).Review of the growth chart provided by Dr. Rivers at [facility] [place] shows that Teresa has been growing at the 60th percentile for height until around age 9 with gradual deceleration to the 12th percentile, with weight at the 25th percentile until around age 9-10 when weight has drifted down to the 3rd percentile, with recent gain of 3lbs from February to March of this year.     Interval history:  He is accompanied by his mom. Mom does not need a Belgian .     Since his last visit, his weight has increased from 39.8kg (5.60%ile, Z=-1.59) to 41.6kg (5.09%ile, Z=--1.64), and his height has increased from 156.8cm (13.87%ile, Z=-1.09) to 159.8cm (14.29%ile, Z=--1.07), with growth velocity of 6.522cm/year (77%ile, Z=-0.74).     He has noticed very few signs of puberty. He notes that he really only has body odor but does  not have axillary hair or much pubic hair. He continues to have a low appetite and primarily eats dinner only or a snack after school. He rarely eats the food at school, and also notes that early bus  time is a barrier to eating breakfast a home.     He still has not taken cyproheptadine. Mom is interested in trying this again today.     Continues to have some abdominal pain and constipation. Denies nausea, vomiting, diarrhea, polyuria or polydipsia, heat or cold intolerance, headache, vision changes, and fatigue. No reports of hypoglycemia.     I have reviewed the available past laboratory evaluations, imaging studies, and medical records available to me at this visit. I have reviewed the Teresa's growth chart.    History was obtained from patient and patient's mother.          Past Medical History:   No past medical history on file.         Past Surgical History:     Past Surgical History:   Procedure Laterality Date    COLONOSCOPY N/A 8/4/2015    Procedure: COMBINED COLONOSCOPY, SINGLE OR MULTIPLE BIOPSY/POLYPECTOMY BY BIOPSY;  Surgeon: Randell Faria MD;  Location: [facility]     ESOPHAGOSCOPY, GASTROSCOPY, DUODENOSCOPY (EGD), COMBINED N/A 8/4/2015    Procedure: COMBINED ESOPHAGOSCOPY, GASTROSCOPY, DUODENOSCOPY (EGD), BIOPSY SINGLE OR MULTIPLE;  Surgeon: Randell Faria MD;  Location: [facility]                Social History:   Lives with parents and siblings.           Family History:   Mother's height 5 feet, 7 inches. Mother had her first menstrual period at the age of 17 years.   Father's height 6 feet. Father's progression through puberty is unknown    Many family members quite tall, above 6 feet   Siblings: sister (5) and brother (8) - brother is up to his shoulder     No family history on file.    History of:  Adrenal insufficiency: none.  Autoimmune disease: none.  Calcium problems: none.  Delayed puberty: none.  Diabetes mellitus: none.  Early puberty: none.  Genetic disease:  "none.  Short stature: none.  Thyroid disease: none.         Allergies:   No Known Allergies          Medications:     Current Outpatient Medications   Medication Sig Dispense Refill    Vitamin D, Cholecalciferol, 25 MCG (1000 UT) CAPS Take 25 mcg by mouth daily 90 capsule 0    cyproheptadine (PERIACTIN) 4 MG tablet Take 1 tablet (4 mg) by mouth daily for 180 days (Patient not taking: Reported on 12/6/2023) 90 tablet 1    cyproheptadine (PERIACTIN) 4 MG tablet Take 1 tablet (4 mg) by mouth daily (Patient not taking: Reported on 12/6/2023) 90 tablet 0             Review of Systems:   ROS negative except as noted above             Physical Exam:   Blood pressure 121/64, pulse 75, height 1.598 m (5' 2.91\"), weight 41.6 kg (91 lb 11.4 oz).  Blood pressure reading is in the elevated blood pressure range (BP >= 120/80) based on the 2017 AAP Clinical Practice Guideline.  Height: 159.8 cm  (0\") 14 %ile (Z= -1.07) based on CDC (Boys, 2-20 Years) Stature-for-age data based on Stature recorded on 12/6/2023.  Weight: 41.6 kg (actual weight), 5 %ile (Z= -1.64) based on CDC (Boys, 2-20 Years) weight-for-age data using vitals from 12/6/2023.  BMI: Body mass index is 16.29 kg/m . 4 %ile (Z= -1.70) based on CDC (Boys, 2-20 Years) BMI-for-age based on BMI available as of 12/6/2023.      GENERAL:  he is alert and in no apparent distress.   HEENT:  Head is  normocephalic and atraumatic.  Pupils equal, round and reactive to light and accommodation.  Extraocular movements are intact.  Nares are clear.  Oropharynx shows normal dentition with caries, uvula and palate.  NECK:  Supple.  Thyroid was palpable, non-tender.   LUNGS:  Clear to auscultation bilaterally.   CARDIOVASCULAR:  Regular rate and rhythm without murmur, gallop or rub.   BREASTS:  Geoff 1.  Axillary hair, odor and sweat were absent.   ABDOMEN:  Nondistended.  Positive bowel sounds, soft and nontender.  No hepatosplenomegaly or masses palpable.   GENITOURINARY EXAM:  Pubic " hair is Geoff 1, cannot see evidence of shaved hairs.  Normal external male genitalia. Testicular volume, 10 ml on the left, 12cc on the right (last visit 8cc on the left, 10cc on the right).  MUSCULOSKELETAL:  Normal muscle bulk and tone.  No evidence of scoliosis.   NEUROLOGIC:  Cranial nerves II-XII grossly intact.  Deep tendon reflexes 2+ and symmetric.   SKIN:  Normal with no evidence of acne or oiliness.           Laboratory results:   Baseline labs for short stature:  TFT's: TSH 1.09 on 3/22/2023  Growth factors : never done  CMP: wnl on 3/22/2023  CBC: wnl except for low Hgb to 11.6 on 3/22/2023  UA: never done  Celiac panel: negative 3/22/2023   Karyotype: not done    XR HAND BONE AGE 3/22/2023 11:36 AM       HISTORY: Growth deceleration     COMPARISON: None     FINDINGS:   The patient's chronologic age is 15 years 0 months.  The patient's bone age is 12 years 6 months.   Two standard deviations of the mean for a male at this chronologic age  is 24 months.                                                                      IMPRESSION:   Normal bone age.     DUC ROMAN MD     I have reviewed the bone age performed on 3/22/2023 at a chronologic age of 15 years 0 months.  The bone age was read by the radiologist by the method of Greulich and Moo as 12 years 6 months.  My interpretation by the method of Greulich and Moo as 13 years 6 months.            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 Prior workup was very reassuring again endocrine causes of poor growth although growth factors were not obtained. Workup helped to exclude thyroid hormone deficiency, chronic disease, and celiac. He has never been on stimulants. He does not have any physical stigmata to suggest a genetic cause  of short stature. Although growth factors have not been checked, he has had growth acceleration with weight gain, which would not be seen if he had growth hormone deficiency.     His midparental height is 6ft. He has a delayed bone age that provides a predicted height of 5 feet 10 inches to 6 feet, in additional to maternal history of delayed puberty (menses at age 18), which makes constitutional delay of growth and puberty the most likely diagnosis. On physical exam, his testicular volume is 10-12mL, and is progressing appropriately. We emphasized the importance of nutrition and sleep on growth, and that it is particularly important to eat adequate calories to optimize growth now while he approaches the pubertal growth spurt.     Plan:  - Normal patterns of linear growth were discussed at length with Teresa and his mom.  - Reviewed causes of short stature and discussed work up of growth problems in children, with emphasis on adequate nutrition.  - Reviewed Teresa's previous lab results.  - Reviewed previous growth charts.  - Teresa is to return for follow up in 4-6 months         No orders of the defined types were placed in this encounter.    Thank you for allowing me to participate in the care of your patient.  Please do not hesitate to call with questions or concerns.    This patient was seen and discussed with Dr. Keith, Pediatric Endocrinology Attending.       Sincerely,    Fidelia Castañeda MD  Pediatric Endocrinology Fellow, FL3      CC  Patient Care Team:  Clinic - [facility] as PCP - General (Clinic)  Iris Gonzales MD as Assigned Pediatric Specialist Provider      Copy to patient  SHIRA SOTO   [address]  [facility] [address]          Attestation:    This patient has been seen and evaluated by me, Dorina Bach, MS. I have reviewed today's vital signs, medications, and labs. Discussed with the fellow and agree with the fellow's findings and plan of care.    Dorina Bach,  MS      Pediatric Endocrinology

## 2025-05-07 ENCOUNTER — OFFICE VISIT (OUTPATIENT)
Dept: FAMILY MEDICINE | Facility: CLINIC | Age: 45
End: 2025-05-07
Payer: COMMERCIAL

## 2025-05-07 VITALS
BODY MASS INDEX: 27.63 KG/M2 | WEIGHT: 171.94 LBS | HEIGHT: 66 IN | DIASTOLIC BLOOD PRESSURE: 82 MMHG | SYSTOLIC BLOOD PRESSURE: 136 MMHG | OXYGEN SATURATION: 98 % | TEMPERATURE: 98 F | HEART RATE: 99 BPM

## 2025-05-07 DIAGNOSIS — K21.9 GASTROESOPHAGEAL REFLUX DISEASE, UNSPECIFIED WHETHER ESOPHAGITIS PRESENT: ICD-10-CM

## 2025-05-07 DIAGNOSIS — R11.0 NAUSEA: ICD-10-CM

## 2025-05-07 DIAGNOSIS — E11.9 TYPE 2 DIABETES MELLITUS WITHOUT COMPLICATION, WITHOUT LONG-TERM CURRENT USE OF INSULIN: Primary | ICD-10-CM

## 2025-05-07 PROBLEM — R19.7 DIARRHEA: Status: RESOLVED | Noted: 2025-05-07 | Resolved: 2025-05-07

## 2025-05-07 PROBLEM — R19.7 DIARRHEA: Status: ACTIVE | Noted: 2025-05-07

## 2025-05-07 PROCEDURE — 99999 PR PBB SHADOW E&M-EST. PATIENT-LVL V: CPT | Mod: PBBFAC,,,

## 2025-05-07 RX ORDER — GLIPIZIDE 2.5 MG/1
2.5 TABLET, EXTENDED RELEASE ORAL
Qty: 90 TABLET | Refills: 3 | Status: SHIPPED | OUTPATIENT
Start: 2025-05-07 | End: 2026-05-07

## 2025-05-07 RX ORDER — FAMOTIDINE 20 MG/1
20 TABLET, FILM COATED ORAL NIGHTLY PRN
Qty: 90 TABLET | Refills: 3 | Status: SHIPPED | OUTPATIENT
Start: 2025-05-07

## 2025-05-07 NOTE — PROGRESS NOTES
Assessment & Plan     Type 2 diabetes mellitus without complication, without long-term current use of insulin  Lab Results   Component Value Date    HGBA1C 7.6 (H) 03/26/2025   -- current regimen includes: Mounjaro 12.5 (recently increased)  -- Noting severe Nausea and GI upset with increased dose of Mounjaro - will decrease dose to 10mg today and advise to take q10d to hopefully reduce symptoms.  Discussed that this would have to be accompanied by dietary modification to avoid worsening diabetic control.  -- Will also start on glipizide 2.5mg extended release and monitor for improvement  Orders:  -     glipiZIDE (GLUCOTROL) 2.5 MG TR24; Take 1 tablet (2.5 mg total) by mouth daily with breakfast.  Dispense: 90 tablet; Refill: 3  -     tirzepatide 10 mg/0.5 mL PnIj; Inject 10 mg into the skin every 7 days.  Dispense: 2 mL; Refill: 11  -     Hemoglobin A1C; Future; Expected date: 05/07/2025  -     CBC Auto Differential; Future; Expected date: 05/07/2025  -     Comprehensive Metabolic Panel; Future; Expected date: 05/07/2025  -     Lipid Panel; Future; Expected date: 05/07/2025      Gastroesophageal reflux disease, unspecified whether esophagitis present  Nausea  Already taking pantoprazole 40 q.a.m. AC.  We will add Pepcid q.h.s. as well as production of Mounjaro dose as above & will monitor for improvement.  Offered Zofran, she maintains that this does not work for her.    Orders:  -     famotidine (PEPCID) 20 MG tablet; Take 1 tablet (20 mg total) by mouth nightly as needed for Heartburn (GERD, nausea).  Dispense: 90 tablet; Refill: 3         HPI   Codi Wilkerson is a 44 y.o. female with multiple medical diagnoses as listed in the medical history and problem list who presents for type 2 diabetes, GERD, nausea    ROS:  Patient denies any CP, SOB, fever, chills, emesis, hematochezia, melena    Follow Up with PCP in approximately 3 months      Health Maintenance         Date Due Completion Date    COVID-19 Vaccine (4  - 2024-25 season) 09/01/2024 10/22/2021    Diabetic Eye Exam 01/19/2025 1/19/2024    Override on 4/12/2021: Done (Dr. Crump  - april 2021)    Influenza Vaccine (Season Ended) 09/01/2025 10/2/2023    Override on 10/5/2020: Done (oct 2020 external)    Override on 10/1/2019: Done    Hemoglobin A1c 09/26/2025 3/26/2025    Diabetes Urine Screening 09/28/2025 9/28/2024    Lipid Panel 09/28/2025 9/28/2024    Mammogram 12/31/2025 12/31/2024    Foot Exam 01/28/2026 1/28/2025    Override on 5/2/2023: Done    Override on 2/3/2020: Done    Low Dose Statin 03/28/2026 3/28/2025    TETANUS VACCINE 10/02/2033 10/2/2023    RSV Vaccine (Age 60+ and Pregnant patients) (1 - 1-dose 75+ series) 12/11/2056 ---                 Physical Exam   Vital signs reviewed.   Body mass index is 27.75 kg/m².  General:  Well-developed. NAD.  Sarcopenic-appearing arms and legs  Skin:  Warm, dry.  No rashes or lesions noted.  No palmar erythema.  Cap refill <2s bilaterally  Head:  NC/AT   Eyes:  Conjunctivae w/o exudates or hemorrhage.  Non-icteric sclerae.  Ears:  External ears w/o swelling or erythema.  Neck:  Trachea is midline.  No carotid bruit appreciated.  No cervical adenopathy appreciated.    Lungs:  CTAB without rales, rhonchi or wheezing.   Breathing comfortably on RA.  Heart:  Normal S1 & S2.  No extra heart sounds.   No pulsus alternans.  Abdomen:  Symmetric, non-distended  Extremities:  Radial pulses 2+ and symmetric.       Neuro:  A&O4.  No obvious focal deficits. Negative Tipton's sign.   strength 5/5 bilaterally.  Psychiatric:  Appropriate affect.          History     Past Medical History:  Past Medical History:   Diagnosis Date    Anxiety and depression     psychiatrist Dr. Casanova    Bilateral ovarian cysts 5/30/2017    Diabetes mellitus 2014    type 2    Elevated CEA 5/12/2017    Female pelvic pain 5/30/2017    GERD (gastroesophageal reflux disease)     HLD (hyperlipidemia)     Lesion of right lung 7/5/2017    Migraine  headache     Right ovarian cyst 2017       Past Surgical History:  Past Surgical History:   Procedure Laterality Date     SECTION, LOW TRANSVERSE  ,     COLONOSCOPY N/A 2017    Procedure: COLONOSCOPY;  Surgeon: Phill Valencia MD;  Location: [facility] ([address] [place] [address]);  Service: Endoscopy;  Laterality: N/A; unremarkable, Repeat colonoscopy at regular screening age for screening purposes    COLONOSCOPY N/A 2021    Procedure: COLONOSCOPY;  Surgeon: Kevin Conner MD;  Location: [facility];  Service: Endoscopy;  Laterality: N/A;    ECTOPIC PREGNANCY SURGERY      laparotomy     ESOPHAGOGASTRODUODENOSCOPY N/A 2022    Procedure: ESOPHAGOGASTRODUODENOSCOPY (EGD);  Surgeon: Maycol Mooney MD;  Location: [facility];  Service: Endoscopy;  Laterality: N/A;    HYSTERECTOMY  2009    supracervical LH.     PARTIAL HYSTERECTOMY  3 years ago    SALPINGECTOMY Right        Social History:  Social History[1]    Family History:  Family History   Problem Relation Name Age of Onset    Depression Mother Abbey     Migraines Mother Abbey     Thyroid nodules Mother Abbey         benign    Hypertension Father João     Hyperlipidemia Father João     Hypertension Brother Damian     Heart disease Maternal Grandmother      Heart disease Maternal Grandfather      Hypertension Maternal Grandfather      Colon cancer Neg Hx      Esophageal cancer Neg Hx      Crohn's disease Neg Hx      Irritable bowel syndrome Neg Hx      Stomach cancer Neg Hx      Rectal cancer Neg Hx      Ulcerative colitis Neg Hx      Celiac disease Neg Hx         Allergies and Medications: (updated and reviewed)  Review of patient's allergies indicates:   Allergen Reactions    Indomethacin Rash    Morphine Itching     Current Medications[2]    Patient Care Team:  Ivory Lacy MD as PCP - General (Family Medicine)  Mely Escoto NP as Nurse Practitioner (Gastroenterology)  Jonah Acharya OD as Consulting Physician  (Ophthalmology)  Jake Jordan LPN as Licensed Practical Nurse        Alexandro Dooley PA-C  [facility]  [facility]         - The patient is given an After Visit Summary that lists all medications with directions, allergies, education, orders placed during this encounter and follow-up instructions.      - I have reviewed the patient's medical information including past medical, family, and social history sections including the medications and allergies.      - We discussed the patient's current medications.     This note was created by combination of typed  and MModal dictation.  Transcription errors may be present.  If there are any questions, please contact me.                     [1]   Social History  Socioeconomic History    Marital status:    Tobacco Use    Smoking status: Every Day     Current packs/day: 1.00     Average packs/day: 1 pack/day for 2.3 years (2.3 ttl pk-yrs)     Types: Cigarettes     Start date: 1/1/2023    Smokeless tobacco: Never   Substance and Sexual Activity    Alcohol use: Yes     Alcohol/week: 2.5 standard drinks of alcohol     Types: 3 Standard drinks or equivalent per week     Comment: Just during special occasions    Drug use: No    Sexual activity: Yes     Partners: Male     Birth control/protection: None     Social Drivers of Health     Financial Resource Strain: Patient Declined (5/6/2024)    Overall Financial Resource Strain (CARDIA)     Difficulty of Paying Living Expenses: Patient declined   Food Insecurity: Patient Declined (5/6/2024)    Hunger Vital Sign     Worried About Running Out of Food in the Last Year: Patient declined     Ran Out of Food in the Last Year: Patient declined   Transportation Needs: Patient Declined (5/6/2024)    PRAPARE - Transportation     Lack of Transportation (Medical): Patient declined     Lack of Transportation (Non-Medical): Patient declined   Physical Activity: Unknown (5/6/2024)    Exercise Vital Sign      Days of Exercise per Week: Patient declined   Stress: Patient Declined (5/6/2024)    [place] [employer] - Occupational Stress Questionnaire     Feeling of Stress : Patient declined   Housing Stability: Unknown (5/6/2024)    Housing Stability Vital Sign     Unable to Pay for Housing in the Last Year: Patient declined   [2]   Current Outpatient Medications   Medication Sig Dispense Refill    atorvastatin (LIPITOR) 20 MG tablet Take 1 tablet (20 mg total) by mouth once daily. 90 tablet 1    blood sugar diagnostic Strp To check BG one times daily, to use with insurance preferred meter 200 strip 3    ergocalciferol (ERGOCALCIFEROL) 50,000 unit Cap TAKE 1 CAPSULE BY MOUTH ONCE WEEKLY 12 capsule 3    EScitalopram oxalate (LEXAPRO) 10 MG tablet Take 10 mg by mouth.      fenofibrate micronized (LOFIBRA) 134 MG Cap TAKE 1 CAPSULE BY MOUTH ONCE DAILY 90 capsule 1    lamoTRIgine (LAMICTAL) 25 MG tablet Take 1 tablet (25 mg total) by mouth 2 (two) times daily. (Patient taking differently: Take 1 tablet (25 mg total) by mouth 2 (two) times daily.) 14 tablet 0    lancets Misc To check BG one times daily, to use with insurance preferred meter 200 each 3    losartan (COZAAR) 25 MG tablet TAKE 1 TABLET BY MOUTH ONCE DAILY 90 tablet 1    minoxidiL (LONITEN) 2.5 MG tablet TAKE 1 TABLET BY MOUTH ONCE DAILY 30 tablet 3    ondansetron (ZOFRAN-ODT) 4 MG TbDL Take 1 tablet (4 mg total) by mouth every 8 (eight) hours as needed (nausea). 20 tablet 0    pantoprazole (PROTONIX) 40 MG tablet TAKE 1 TABLET BY MOUTH TWICE DAILY (Patient taking differently: TAKE 1 TABLET BY MOUTH TWICE DAILY) 180 tablet 3    tirzepatide 12.5 mg/0.5 mL PnIj Inject 12.5 mg into the skin every 7 days. 3 mL 0    blood-glucose meter kit To check BG one times daily, to use with insurance preferred meter 1 each 0    famotidine (PEPCID) 20 MG tablet Take 1 tablet (20 mg total) by mouth nightly as needed for Heartburn (GERD, nausea). 90 tablet 3     glipiZIDE (GLUCOTROL) 2.5 MG TR24 Take 1 tablet (2.5 mg total) by mouth daily with breakfast. 90 tablet 3    [START ON 8/2/2025] tirzepatide 10 mg/0.5 mL PnIj Inject 10 mg into the skin every 7 days. 2 mL 11     No current facility-administered medications for this visit.

## 2025-05-07 NOTE — ASSESSMENT & PLAN NOTE
Lab Results   Component Value Date    HGBA1C 7.6 (H) 03/26/2025   -- current regimen includes: Mounjaro 12.5 (recently increased)  -- Noting severe Nausea and GI upset with increased dose of Mounjaro - will decrease dose to 10mg today and advise to take q10d to hopefully reduce symptoms.  Discussed that this would have to be accompanied by dietary modification to avoid worsening diabetic control.  -- Will also start on glipizide 2.5mg extended release and monitor for improvement

## 2025-05-07 NOTE — PATIENT INSTRUCTIONS
"Thank you for seeing me today.    Exercise:  -- Walking is great  -- 1 minute of assisted squats every other day.    -- On alternating days, 2-3 minutes of light dumbbells.  Start with 5 lb only  -- Eliptical is great and tends to be gentler on the knees  Okay to listen to music while exercising!    Stop if any pain (avoiding injury is paramount - slow and steady)    DIABETES: MEAL PLAN  Breakfast = protein shake + ground flax seed   Lunch = eggs or egg whites + lean meat/fish + avocado   Snack = unsweetened Greek Yoghurt or nuts, fresh fruit in small quantities  Dinner = some wiggle room here if adhering to the above, but still be mindful of total calories    Artificial Sweeteners with low glycemic index:  stevia, erythritol, monk fruit  No juices!     Diabetes - General Recommendations    Calorie Restriction:   -- About:  Calorie restriction (a negative calorie balance) is the only way to lose weight (short of surgical liposuction)... Stimulants, injectables & even gastric bypass surgery all simply suppress appetite and/or make it easier to eat less - however, even in these cases, if a negative calorie balance is not achieved, weight loss does not occur.  -- Goal:  Aim for a daily deficit of 500 calories to lose about one pound per week (takes about 3500 calories to lose 1 lb).  For example, if the average adult requires 2,000 calories per day, reducing intake to 1,500 calories per day should result in about 1lb of weight loss per week.  Ensure that your daily intake does not drop below 1,200 calories per day in order to avoid nutrient deficiency  -- Also:  The "EthonovaPal" mc may be very helpful in counting calories to reach the targets described above.     Protein Intake:  -- About:  While in a calorie deficit, protein intake (with even modest physical activity) will help to protect lean muscle mass, ensuring that your body burns only fat to make up for your target calorie deficit (see above)  -- Goal:  Aim " for approximately 100g of protein per day can help preserve lean muscle mass during calorie restriction  -- Also:  Great sources of protein:  chicken breast, lean meats/fish, egg whites, unsweetened Greek Yoghurt, whey protein (powder is much more affordable than pre-mixed protein drinks)    ... You may also consider ...          Fiber:  --  Fiber intake increases satiety (especially when taken with or right before meals).  It also lowers cholesterol, improves glucose homeostasis, promotes regularity and is somewhat protective against colorectal cancer.  --   Sources of Fiber:       ** Supplemental fiber should be accompanied by ample hydration (about 2.5-3 liters of water per day at least) **      -   Vegetables are generally high in fiber and low in calories (root vegetables generally being the exception)    -   Avocados are incredibly high in both fiber & healthy fat     -   Ground Flax Seed:  Flax is a gentle (modest) source of fiber which is Not likely to cause bloating.  Try 1 tbsp of ground flax seed with a shake/smoothie or added to oatmeal.  Nutty flavor, affordable, well-tolerated.     -   Psyllium Husk (Metamucil):  Excellent source of fiber, but start slowly!  Start with about 1 tsp per day taken with a glass (8oz) of water, and slowly increase to 1-3 tbsp per day before or with meals as tolerated.    -   Citrucel:  Well tolerated, gentle with lower risk of bloating, safe for use in chronic kidney disease.        Limit Carbohydrates:  -- Choose higher-fiber, lower-carbohydrate meals to control hunger and appetite. Opt for carbs high in fiber and low in added sugar, such as beans and sweet potatoes, and avoid sugary drinks and chips    Increase Physical Activity:  -- Incorporate physical activity to enhance insulin sensitivity and slightly assist with achieving a calorie deficit.  Aim for strength-training activities at least two days per week, with 2-3 days of moderate to vigorous cardiovascular training  (brisk walking, cycling, swimming) if possible.     Please don't hesitate to seek emergency care if you develop any new or worsening symptoms.

## 2025-05-09 ENCOUNTER — TELEPHONE (OUTPATIENT)
Dept: FAMILY MEDICINE | Facility: CLINIC | Age: 45
End: 2025-05-09
Payer: COMMERCIAL

## 2025-05-09 NOTE — TELEPHONE ENCOUNTER
Patient states she wanted to speak with provider personally. Advised patient that she can speak with her through VV. Virtual visit scheduled 5/13/25

## 2025-05-13 ENCOUNTER — OFFICE VISIT (OUTPATIENT)
Dept: FAMILY MEDICINE | Facility: CLINIC | Age: 45
End: 2025-05-13
Payer: COMMERCIAL

## 2025-05-13 DIAGNOSIS — E78.5 HYPERLIPIDEMIA, UNSPECIFIED HYPERLIPIDEMIA TYPE: ICD-10-CM

## 2025-05-13 DIAGNOSIS — F31.9 BIPOLAR AFFECTIVE DISORDER, REMISSION STATUS UNSPECIFIED: ICD-10-CM

## 2025-05-13 DIAGNOSIS — K29.00 OTHER ACUTE GASTRITIS WITHOUT HEMORRHAGE: ICD-10-CM

## 2025-05-13 DIAGNOSIS — E11.9 TYPE 2 DIABETES MELLITUS WITHOUT COMPLICATION, WITHOUT LONG-TERM CURRENT USE OF INSULIN: Primary | ICD-10-CM

## 2025-05-13 DIAGNOSIS — K21.9 GASTROESOPHAGEAL REFLUX DISEASE, UNSPECIFIED WHETHER ESOPHAGITIS PRESENT: ICD-10-CM

## 2025-05-13 PROCEDURE — 1159F MED LIST DOCD IN RCRD: CPT | Mod: CPTII,95,, | Performed by: FAMILY MEDICINE

## 2025-05-13 PROCEDURE — 4010F ACE/ARB THERAPY RXD/TAKEN: CPT | Mod: CPTII,95,, | Performed by: FAMILY MEDICINE

## 2025-05-13 PROCEDURE — 3051F HG A1C>EQUAL 7.0%<8.0%: CPT | Mod: CPTII,95,, | Performed by: FAMILY MEDICINE

## 2025-05-13 PROCEDURE — G2211 COMPLEX E/M VISIT ADD ON: HCPCS | Mod: 95,,, | Performed by: FAMILY MEDICINE

## 2025-05-13 PROCEDURE — 1160F RVW MEDS BY RX/DR IN RCRD: CPT | Mod: CPTII,95,, | Performed by: FAMILY MEDICINE

## 2025-05-13 PROCEDURE — 98005 SYNCH AUDIO-VIDEO EST LOW 20: CPT | Mod: 95,,, | Performed by: FAMILY MEDICINE

## 2025-05-13 RX ORDER — DULAGLUTIDE 1.5 MG/.5ML
1.5 INJECTION, SOLUTION SUBCUTANEOUS
Qty: 4 PEN | Refills: 11 | Status: SHIPPED | OUTPATIENT
Start: 2025-05-13 | End: 2026-05-13

## 2025-05-16 NOTE — PROGRESS NOTES
Telemedicine Office Visit    Codi Wilkerson    1980  [id number]    Subjective     The patient location is: home   The chief complaint leading to consultation is: medication side effects  Visit type: Virtual visit with synchronous audio and video  Total time spent with patient: 20 minutes   Each patient to whom he or she provides medical services by telemedicine is:  (1) informed of the relationship between the physician and patient and the respective role of any other health care provider with respect to management of the patient; and (2) notified that he or she may decline to receive medical services by telemedicine and may withdraw from such care at any time.    History of Present Illness    CHIEF COMPLAINT:  Patient presents with complaints of severe GI symptoms, including diarrhea, nausea, and abdominal pain, which she attributes to her diabetes medication Mounjaro.    HPI:  Patient reports severe GI symptoms attributed to her diabetes medication Mounjaro. She describes diarrhea, nausea, abdominal pain, bloating, and belching with a sulfuric taste. These symptoms began when her Mounjaro dose was increased to 10 mg and worsened significantly when further increased to 12.5 mg about 2 weeks ago. Symptoms occur sporadically, typically starting the evening of or the day after her Mounjaro injection, lasting for about 3 days each episode.    She has tried taking famotidine (highest milligram) and Pepto-Bismol for relief without improvement. On one particularly severe occasion, she went to the emergency room due to extreme abdominal pain, where she was given fentanyl for pain and promethazine for nausea.    She has been eating lightly, mostly consuming foods like sandwiches, soup, and salad. She has been on a diet and exercising for about 3 weeks, yet symptoms have persisted.    Patient expresses dissatisfaction with her previous provider's management. When she complained about these symptoms, she was advised to  reduce the Mounjaro dose to 10 mg and extend the interval between doses to 10 days instead of 7. She was also prescribed glipizide. She requested Bentyl for stomach cramps but was denied.    She has a history of diabetes for many years. Her A1C was 6.5 last year while on Mounjaro and Jardiance. She was previously on Trulicity and did not have similar side effects. She cannot tolerate Metformin, and Jardiance was discontinued due to recurrent yeast infections.    MEDICATIONS:  Patient is on Mounjaro 12.5 mg for diabetes, which is causing severe side effects including diarrhea, nausea, stomach cramps, bloating, and sulfuric belching. She was previously on Jardiance for diabetes but discontinued it due to side effects. Metformin was also prescribed for diabetes, but the patient was unable to tolerate it. She is taking the highest mg of Famotidine and Pepto-Bismol for GI symptoms. Patient's Mounjaro dosage was increased from 10 mg to 12.5 mg a few weeks ago.    MEDICAL HISTORY:  Patient has a history of diabetes for many years.    TEST RESULTS:  Patient's A1C level last year was 6.5 while on Mounjaro and Jardiance. Her recent A1C has increased.    SOCIAL HISTORY:  Occupation: Works in pharmacy for 25 years      ROS:  General: no fever, no chills, no fatigue, no weight gain, no weight loss  Eyes: no vision changes, no redness, no discharge  ENT: no ear pain, no nasal congestion, no sore throat  Cardiovascular: no chest pain, no palpitations, no lower extremity edema  Respiratory: no cough, no shortness of breath  Gastrointestinal: +abdominal pain, +nausea, no vomiting, +diarrhea, no constipation, no blood in stool, +bloating, +abdominal distention, +excessive belching, +loss of appetite  Genitourinary: no dysuria, no hematuria, no frequency  Musculoskeletal: no joint pain, no muscle pain, +limb pain, +pain with movement  Skin: no rash, no lesion  Neurological: no headache, no dizziness, no numbness, no  tingling  Psychiatric: no anxiety, no depression, no sleep difficulty  Female Genitourinary: +vaginal itching or burning, +vulvar itching or burning, +vaginal discharge         Answers submitted by the patient for this visit:  Diabetes Questionnaire (Submitted on 5/13/2025)  Chief Complaint: Diabetes problem  Diabetes type: type 2  MedicAlert ID: No  Disease duration: 15 Years  blurred vision: No  chest pain: No  fatigue: Yes  foot paresthesias: No  foot ulcerations: No  polydipsia: Yes  polyphagia: No  polyuria: Yes  visual change: No  weakness: No  weight loss: No  Symptom course: improving  confusion: Yes  speech difficulty: No  dizziness: No  nervous/anxious: Yes  headaches: No  hunger: Yes  mood changes: Yes  pallor: No  seizures: No  tremors: No  sleepiness: Yes  sweats: No  blackouts: No  hospitalization: No  nocturnal hypoglycemia: No  required assistance: No  required glucagon: No  CVA: No  heart disease: No  nephropathy: No  peripheral neuropathy: No  PVD: No  retinopathy: No  autonomic neuropathy: No  CAD risks: no known risk factors, dyslipidemia, family history, obesity, stress, tobacco exposure, diabetes mellitus  Current treatments: none, diet  Treatment compliance: some of the time  Monitoring compliance: poor  Blood glucose trend: fluctuating minimally  Weight trend: fluctuating minimally  Current diet: diabetic, generally healthy  Meal planning: avoidance of concentrated sweets  Exercise: intermittently  Dietitian visit: No  Eye exam current: Yes  Sees podiatrist: Yes    [facility] 02/12/2009   Physical Exam  Constitutional:       Appearance: She is well-developed.   HENT:      Head: Normocephalic and atraumatic.      Right Ear: External ear normal.      Left Ear: External ear normal.   Eyes:      Conjunctiva/sclera: Conjunctivae normal.   Pulmonary:      Effort: Pulmonary effort is normal.   Musculoskeletal:      Cervical back: Normal range of motion.   Skin:     Coloration: Skin is not pale.    Neurological:      Mental Status: She is alert and oriented to person, place, and time.   Psychiatric:         Behavior: Behavior normal.         Thought Content: Thought content normal.         Judgment: Judgment normal.             Plan     Assessment & Plan            Problem List Items Addressed This Visit          Psychiatric    Bipolar disorder  Noted in chart         Cardiac/Vascular    Hyperlipidemia  The current medical regimen is effective;  continue present plan and medications.          Endocrine    Type 2 diabetes mellitus without complication, without long-term current use of insulin - Primary    Overview   type 2   Monitored patient's A1C, which was 6.5 last year with Mounjaro and Jardiance, but is currently higher.   Evaluated alternative diabetes management options due to hyperglycemia.   Initiated Trulicity 1.5 mg weekly injection, a GLP-1 receptor agonist, to replace Mounjaro (GLP-1 and GIP).   Explained the mechanism of action difference between the medications and advised on temporary nature of glucose elevation with steroid use.   Recommend lifestyle modifications focusing on protein and vegetable intake, especially on Trulicity injection days, to complement medication change and prevent constipation.   Patient to pay attention to carbohydrate intake, particularly on injection days.   Scheduled follow up in 3 months to reassess A1C and adjust treatment if necessary.   Confirmed labs and appointment are already scheduled.           Relevant Medications    dulaglutide (TRULICITY) 1.5 mg/0.5 mL pen injector       GI    Gastroesophageal reflux disease  Other acute gastritis without hemorrhage    Evaluated patient's reports of diarrhea, nausea, bloating, belching, and abdominal pain, which are consistent with toxic gastroenteritis.   Assessed symptoms and potential causes related to medication and diet.   Management approach includes switching from Mounjaro to Trulicity and continuing Bentyl as  needed for symptom relief.             This note was generated with the assistance of ambient listening technology. Verbal consent was obtained by the patient and accompanying visitor(s) for the recording of patient appointment to facilitate this note. I attest to having reviewed and edited the generated note for accuracy, though some syntax or spelling errors may persist. Please contact the author of this note for any clarification.

## 2025-05-17 ENCOUNTER — TELEPHONE (OUTPATIENT)
Dept: PHARMACY | Facility: CLINIC | Age: 45
End: 2025-05-17
Payer: COMMERCIAL

## 2025-05-18 NOTE — TELEPHONE ENCOUNTER
[facility]/Population Health Chart Review & Patient Outreach Details For Medication Adherence Project    Reason for Outreach Encounter: 3rd Party payor non-compliance report ([employer], [employer], C, etc)  2.  Patient Outreach Method: Reviewed Patient Chart  3.   Medication in question: losartan    LAST FILLED: 4/23/25 for 30 day supply  Hypertension Medications              losartan (COZAAR) 25 MG tablet TAKE 1 TABLET BY MOUTH ONCE DAILY    minoxidiL (LONITEN) 2.5 MG tablet TAKE 1 TABLET BY MOUTH ONCE DAILY              4.  Reviewed and or Updates Made To: Patient Chart  5. Outreach Outcomes and/or actions taken: Patient filled medication and is on track to be adherent

## 2025-05-28 DIAGNOSIS — E55.9 VITAMIN D DEFICIENCY: ICD-10-CM

## 2025-05-28 DIAGNOSIS — L65.9 HAIR LOSS: ICD-10-CM

## 2025-05-28 RX ORDER — ERGOCALCIFEROL 1.25 MG/1
50000 CAPSULE ORAL
Qty: 12 CAPSULE | Refills: 3 | Status: SHIPPED | OUTPATIENT
Start: 2025-05-28

## 2025-05-28 RX ORDER — MINOXIDIL 2.5 MG/1
2.5 TABLET ORAL
Qty: 30 TABLET | Refills: 3 | Status: SHIPPED | OUTPATIENT
Start: 2025-05-28

## 2025-05-28 NOTE — TELEPHONE ENCOUNTER
No care due was identified.  [facility] Embedded Care Due Messages. Reference number: [id number].   5/28/2025 2:27:31 PM CDT

## 2025-06-10 DIAGNOSIS — E78.5 HYPERLIPIDEMIA, UNSPECIFIED HYPERLIPIDEMIA TYPE: ICD-10-CM

## 2025-06-10 RX ORDER — FENOFIBRATE 134 MG/1
134 CAPSULE ORAL
Qty: 90 CAPSULE | Refills: 3 | Status: SHIPPED | OUTPATIENT
Start: 2025-06-10

## 2025-06-10 NOTE — TELEPHONE ENCOUNTER
Care Due:                  Date            Visit Type   Department     Provider  --------------------------------------------------------------------------------                                ESTABLISHED   [facility] FAMILY MED                              PATIENT -    / INTERNAL MED  Last Visit: 05-      VIRTUAL      / PEDS         Ivory Lacy                              EP -         [facility] MED                              PRIMARY      / INTERNAL MED  Next Visit: 09-      CARE ([facility])   / JOSEMANUEL Lacy                                                            Last  Test          Frequency    Reason                     Performed    Due Date  --------------------------------------------------------------------------------    Vitamin D...  12 months..  ergocalciferol...........  Not Found    Overdue    [employer] Embedded Care Due Messages. Reference number: [id number].   6/10/2025 3:32:08 PM CDT

## 2025-06-10 NOTE — TELEPHONE ENCOUNTER
Refill Decision Note   Codi Wilkerson  is requesting a refill authorization.  Brief Assessment and Rationale for Refill:  Approve     Medication Therapy Plan:         Comments:     Note composed:4:48 PM 06/10/2025

## 2025-06-16 ENCOUNTER — TELEPHONE (OUTPATIENT)
Dept: PHARMACY | Facility: CLINIC | Age: 45
End: 2025-06-16
Payer: COMMERCIAL

## 2025-06-17 NOTE — TELEPHONE ENCOUNTER
Needs blood hcg & if neg, can call in luis to start that day -- give enough to last until annual once hcg back [facility]/Population Health Chart Review & Patient Outreach Details For Medication Adherence Project    Reason for Outreach Encounter: 3rd Party payor non-compliance report ([employer], [employer], [facility], etc)  2.  Patient Outreach Method: [employer] message  3.   Medication in question: losartan    LAST FILLED: 4/23/25 for 30 day supply  Hypertension Medications              losartan (COZAAR) 25 MG tablet TAKE 1 TABLET BY MOUTH ONCE DAILY    minoxidiL (LONITEN) 2.5 MG tablet TAKE 1 TABLET BY MOUTH ONCE DAILY              4.  Reviewed and or Updates Made To: Patient Chart  5. Outreach Outcomes and/or actions taken: Sent inquiry to patient: Waiting for response.

## 2025-06-18 ENCOUNTER — CLINICAL SUPPORT (OUTPATIENT)
Dept: SMOKING CESSATION | Facility: CLINIC | Age: 45
End: 2025-06-18
Payer: COMMERCIAL

## 2025-06-18 DIAGNOSIS — F17.200 NICOTINE DEPENDENCE: Primary | ICD-10-CM

## 2025-06-18 DIAGNOSIS — F17.200 NICOTINE DEPENDENCE: ICD-10-CM

## 2025-06-18 PROCEDURE — 99407 BEHAV CHNG SMOKING > 10 MIN: CPT | Mod: S$GLB,,, | Performed by: FAMILY MEDICINE

## 2025-06-19 RX ORDER — IBUPROFEN 200 MG
TABLET ORAL
Qty: 14 PATCH | Refills: 0 | Status: SHIPPED | OUTPATIENT
Start: 2025-06-19

## 2025-06-19 NOTE — TELEPHONE ENCOUNTER
Refill Routing Note   Medication(s) are not appropriate for processing by [facility] for the following reason(s):        Outside of protocol  No active prescription written by provider    [facility] action(s):  Route        Medication Therapy Plan: it appears as though the Pt may have requested the pharmacy fax for refill      Appointments  past 12m or future 3m with PCP    Date Provider   Last Visit   5/13/2025 Ivory Lacy MD   Next Visit   9/9/2025 Ivory Lacy MD   ED visits in past 90 days: 0        Note composed:8:22 AM 06/19/2025

## 2025-07-07 ENCOUNTER — TELEPHONE (OUTPATIENT)
Dept: PHARMACY | Facility: CLINIC | Age: 45
End: 2025-07-07
Payer: COMMERCIAL

## 2025-07-07 NOTE — TELEPHONE ENCOUNTER
[facility]/Population Health Chart Review & Patient Outreach Details For Medication Adherence Project    Reason for Outreach Encounter: 3rd Party payor non-compliance report ([employer], [employer], C, etc)  2.  Patient Outreach Method: Reviewed patient chart   3.   Medication in question:    Hyperlipidemia Medications              atorvastatin (LIPITOR) 20 MG tablet Take 1 tablet (20 mg total) by mouth once daily.    fenofibrate micronized (LOFIBRA) 134 MG Cap TAKE 1 CAPSULE BY MOUTH ONCE DAILY                  atorvastatin   last filled  4/23/25 for 30 day supply    4.  Reviewed and or Updates Made To: Patient Chart  5. Outreach Outcomes and/or actions taken: Patient reminded to  prescription  Additional Notes:

## 2025-07-28 ENCOUNTER — TELEPHONE (OUTPATIENT)
Dept: PHARMACY | Facility: CLINIC | Age: 45
End: 2025-07-28
Payer: COMMERCIAL

## 2025-07-29 NOTE — TELEPHONE ENCOUNTER
[facility]/Population Health Chart Review & Patient Outreach Details For Medication Adherence Project    Reason for Outreach Encounter: 3rd Party payor non-compliance report ([employer], [employer]BS, [facility], etc)  2.  Patient Outreach Method: [employer] message  3.   Medication in question: losartan   LAST FILLED: 4/23/25 for 30 day supply  Hypertension Medications              losartan (COZAAR) 25 MG tablet TAKE 1 TABLET BY MOUTH ONCE DAILY    minoxidiL (LONITEN) 2.5 MG tablet TAKE 1 TABLET BY MOUTH ONCE DAILY              4.  Reviewed and or Updates Made To: Patient Chart  5. Outreach Outcomes and/or actions taken: Sent inquiry to patient: Waiting for response.

## 2025-07-30 ENCOUNTER — TELEPHONE (OUTPATIENT)
Dept: PHARMACY | Facility: CLINIC | Age: 45
End: 2025-07-30
Payer: COMMERCIAL

## 2025-07-31 NOTE — TELEPHONE ENCOUNTER
[facility]/Population Health Chart Review & Patient Outreach Details For Medication Adherence Project    Reason for Outreach Encounter: 3rd Party payor non-compliance report ([employer], [employer], C, etc)  2.  Patient Outreach Method: [employer] message  3.   Medication in question:    Diabetes Medications              dulaglutide (TRULICITY) 1.5 mg/0.5 mL pen injector  There are no recent dispenses on file  Inject 1.5 mg into the skin every 7 days.    glipiZIDE (GLUCOTROL) 2.5 MG TR24  No dispenses within 180 days of the adherence period (since 11/9/2024)  Take 1 tablet (2.5 mg total) by mouth daily with breakfast.                     4.  Reviewed and or Updates Made To: Patient Chart  5. Outreach Outcomes and/or actions taken: Sent inquiry to patient: Waiting for response  Additional Notes:

## 2025-08-05 DIAGNOSIS — K21.9 GASTROESOPHAGEAL REFLUX DISEASE, UNSPECIFIED WHETHER ESOPHAGITIS PRESENT: ICD-10-CM

## 2025-08-05 NOTE — TELEPHONE ENCOUNTER
Care Due:                  Date            Visit Type   Department     Provider  --------------------------------------------------------------------------------                                ESTABLISHED   [facility] FAMILY MED                              PATIENT -    / INTERNAL MED  Last Visit: 05-      [place]      / JOSEMANUEL Lacy  Next Visit: None Scheduled  None         None Found                                                            Last  Test          Frequency    Reason                     Performed    Due Date  --------------------------------------------------------------------------------    CBC.........  12 months..  fenofibrate..............  09- 09-    HBA1C.......  6 months...  dulaglutide..............  03- 09-    Lipid Panel.  12 months..  atorvastatin, fenofibrate  09- 09-    [facility] Embedded Care Due Messages. Reference number: [id number].   8/05/2025 10:19:32 AM CDT

## 2025-08-06 RX ORDER — PANTOPRAZOLE SODIUM 40 MG/1
40 TABLET, DELAYED RELEASE ORAL 2 TIMES DAILY
Qty: 180 TABLET | Refills: 3 | Status: SHIPPED | OUTPATIENT
Start: 2025-08-06

## 2025-08-06 NOTE — TELEPHONE ENCOUNTER
Refill Routing Note   Medication(s) are not appropriate for processing by [facility] for the following reason(s):        Outside of protocol    [facility] action(s):  Route     Requires labs : Yes      Medication Therapy Plan: PPI > 40 mg daily is outside of [facility] protocol      Appointments  past 12m or future 3m with PCP    Date Provider   Last Visit   5/13/2025 Ivory Lacy MD   Next Visit   Visit date not found Ivory Lacy MD   ED visits in past 90 days: 0        Note composed:8:50 PM 08/05/2025

## 2025-08-07 ENCOUNTER — TELEPHONE (OUTPATIENT)
Dept: PHARMACY | Facility: CLINIC | Age: 45
End: 2025-08-07
Payer: COMMERCIAL

## 2025-08-07 NOTE — TELEPHONE ENCOUNTER
[facility]/Population Health Chart Review & Patient Outreach Details For Medication Adherence Project    Reason for Outreach Encounter: 3rd Party payor non-compliance report ([employer], [employer]BS, [facility], etc)  2.  Patient Outreach Method: [employer] message  3.   Medication in question: atorvastatin    LAST FILLED: 3/26/25 for 30 day supply  Hyperlipidemia Medications              atorvastatin (LIPITOR) 20 MG tablet Take 1 tablet (20 mg total) by mouth once daily.    fenofibrate micronized (LOFIBRA) 134 MG Cap TAKE 1 CAPSULE BY MOUTH ONCE DAILY               4.  Reviewed and or Updates Made To: Patient Chart  5. Outreach Outcomes and/or actions taken: Sent inquiry to patient: Waiting for response.

## 2025-08-18 ENCOUNTER — TELEPHONE (OUTPATIENT)
Dept: PHARMACY | Facility: CLINIC | Age: 45
End: 2025-08-18
Payer: COMMERCIAL